# Patient Record
Sex: MALE | Race: WHITE | NOT HISPANIC OR LATINO | Employment: OTHER | URBAN - METROPOLITAN AREA
[De-identification: names, ages, dates, MRNs, and addresses within clinical notes are randomized per-mention and may not be internally consistent; named-entity substitution may affect disease eponyms.]

---

## 2021-10-03 ENCOUNTER — HOSPITAL ENCOUNTER (INPATIENT)
Facility: HOSPITAL | Age: 86
LOS: 6 days | Discharge: HOME HEALTH | End: 2021-10-09
Attending: EMERGENCY MEDICINE | Admitting: HOSPITALIST

## 2021-10-03 DIAGNOSIS — R31.0 GROSS HEMATURIA: Primary | ICD-10-CM

## 2021-10-03 LAB
ALBUMIN SERPL-MCNC: 3.8 G/DL (ref 3.5–5.2)
ALBUMIN/GLOB SERPL: 1.4 G/DL
ALP SERPL-CCNC: 61 U/L (ref 39–117)
ALT SERPL W P-5'-P-CCNC: 10 U/L (ref 1–41)
ANION GAP SERPL CALCULATED.3IONS-SCNC: 8.2 MMOL/L (ref 5–15)
APTT PPP: 39.1 SECONDS (ref 22.7–35.4)
AST SERPL-CCNC: 21 U/L (ref 1–40)
BACTERIA UR QL AUTO: ABNORMAL /HPF
BASOPHILS # BLD AUTO: 0.02 10*3/MM3 (ref 0–0.2)
BASOPHILS NFR BLD AUTO: 0.3 % (ref 0–1.5)
BILIRUB SERPL-MCNC: 0.5 MG/DL (ref 0–1.2)
BILIRUB UR QL STRIP: NEGATIVE
BUN SERPL-MCNC: 34 MG/DL (ref 8–23)
BUN/CREAT SERPL: 20.9 (ref 7–25)
CALCIUM SPEC-SCNC: 9.5 MG/DL (ref 8.2–9.6)
CHLORIDE SERPL-SCNC: 104 MMOL/L (ref 98–107)
CLARITY UR: ABNORMAL
CO2 SERPL-SCNC: 26.8 MMOL/L (ref 22–29)
COLOR UR: ABNORMAL
CREAT SERPL-MCNC: 1.63 MG/DL (ref 0.76–1.27)
DEPRECATED RDW RBC AUTO: 46.8 FL (ref 37–54)
EOSINOPHIL # BLD AUTO: 0.05 10*3/MM3 (ref 0–0.4)
EOSINOPHIL NFR BLD AUTO: 0.8 % (ref 0.3–6.2)
ERYTHROCYTE [DISTWIDTH] IN BLOOD BY AUTOMATED COUNT: 12.7 % (ref 12.3–15.4)
GFR SERPL CREATININE-BSD FRML MDRD: 39 ML/MIN/1.73
GLOBULIN UR ELPH-MCNC: 2.8 GM/DL
GLUCOSE SERPL-MCNC: 113 MG/DL (ref 65–99)
GLUCOSE UR STRIP-MCNC: NEGATIVE MG/DL
HCT VFR BLD AUTO: 29.5 % (ref 37.5–51)
HGB BLD-MCNC: 9.4 G/DL (ref 13–17.7)
HGB UR QL STRIP.AUTO: ABNORMAL
HYALINE CASTS UR QL AUTO: ABNORMAL /LPF
IMM GRANULOCYTES # BLD AUTO: 0.01 10*3/MM3 (ref 0–0.05)
IMM GRANULOCYTES NFR BLD AUTO: 0.2 % (ref 0–0.5)
INR PPP: 1.19 (ref 0.9–1.1)
KETONES UR QL STRIP: ABNORMAL
LEUKOCYTE ESTERASE UR QL STRIP.AUTO: ABNORMAL
LYMPHOCYTES # BLD AUTO: 0.7 10*3/MM3 (ref 0.7–3.1)
LYMPHOCYTES NFR BLD AUTO: 10.8 % (ref 19.6–45.3)
MCH RBC QN AUTO: 31.9 PG (ref 26.6–33)
MCHC RBC AUTO-ENTMCNC: 31.9 G/DL (ref 31.5–35.7)
MCV RBC AUTO: 100 FL (ref 79–97)
MONOCYTES # BLD AUTO: 0.56 10*3/MM3 (ref 0.1–0.9)
MONOCYTES NFR BLD AUTO: 8.6 % (ref 5–12)
NEUTROPHILS NFR BLD AUTO: 5.16 10*3/MM3 (ref 1.7–7)
NEUTROPHILS NFR BLD AUTO: 79.3 % (ref 42.7–76)
NITRITE UR QL STRIP: POSITIVE
NRBC BLD AUTO-RTO: 0 /100 WBC (ref 0–0.2)
PH UR STRIP.AUTO: 7.5 [PH] (ref 5–8)
PLATELET # BLD AUTO: 149 10*3/MM3 (ref 140–450)
PMV BLD AUTO: 9.5 FL (ref 6–12)
POTASSIUM SERPL-SCNC: 5.1 MMOL/L (ref 3.5–5.2)
PROT SERPL-MCNC: 6.6 G/DL (ref 6–8.5)
PROT UR QL STRIP: ABNORMAL
PROTHROMBIN TIME: 14.9 SECONDS (ref 11.7–14.2)
RBC # BLD AUTO: 2.95 10*6/MM3 (ref 4.14–5.8)
RBC # UR: ABNORMAL /HPF
REF LAB TEST METHOD: ABNORMAL
SODIUM SERPL-SCNC: 139 MMOL/L (ref 136–145)
SP GR UR STRIP: 1.01 (ref 1–1.03)
SQUAMOUS #/AREA URNS HPF: ABNORMAL /HPF
UROBILINOGEN UR QL STRIP: ABNORMAL
WBC # BLD AUTO: 6.5 10*3/MM3 (ref 3.4–10.8)
WBC UR QL AUTO: ABNORMAL /HPF

## 2021-10-03 PROCEDURE — 99284 EMERGENCY DEPT VISIT MOD MDM: CPT

## 2021-10-03 PROCEDURE — 80053 COMPREHEN METABOLIC PANEL: CPT | Performed by: EMERGENCY MEDICINE

## 2021-10-03 PROCEDURE — 87186 SC STD MICRODIL/AGAR DIL: CPT | Performed by: EMERGENCY MEDICINE

## 2021-10-03 PROCEDURE — 85610 PROTHROMBIN TIME: CPT | Performed by: EMERGENCY MEDICINE

## 2021-10-03 PROCEDURE — 85730 THROMBOPLASTIN TIME PARTIAL: CPT | Performed by: EMERGENCY MEDICINE

## 2021-10-03 PROCEDURE — 85025 COMPLETE CBC W/AUTO DIFF WBC: CPT | Performed by: EMERGENCY MEDICINE

## 2021-10-03 PROCEDURE — U0004 COV-19 TEST NON-CDC HGH THRU: HCPCS | Performed by: EMERGENCY MEDICINE

## 2021-10-03 PROCEDURE — 25010000002 CEFTRIAXONE PER 250 MG: Performed by: EMERGENCY MEDICINE

## 2021-10-03 PROCEDURE — 87086 URINE CULTURE/COLONY COUNT: CPT | Performed by: EMERGENCY MEDICINE

## 2021-10-03 PROCEDURE — 81001 URINALYSIS AUTO W/SCOPE: CPT | Performed by: EMERGENCY MEDICINE

## 2021-10-03 PROCEDURE — U0005 INFEC AGEN DETEC AMPLI PROBE: HCPCS | Performed by: EMERGENCY MEDICINE

## 2021-10-03 RX ORDER — LATANOPROST 50 UG/ML
1 SOLUTION/ DROPS OPHTHALMIC NIGHTLY
COMMUNITY

## 2021-10-03 RX ORDER — FERROUS SULFATE 325(65) MG
325 TABLET ORAL
COMMUNITY
End: 2022-02-16 | Stop reason: HOSPADM

## 2021-10-03 RX ORDER — FUROSEMIDE 20 MG/1
20 TABLET ORAL DAILY PRN
COMMUNITY
End: 2021-10-09 | Stop reason: HOSPADM

## 2021-10-03 RX ORDER — DIGOXIN 125 MCG
125 TABLET ORAL
COMMUNITY

## 2021-10-03 RX ORDER — TAMSULOSIN HYDROCHLORIDE 0.4 MG/1
1 CAPSULE ORAL NIGHTLY
COMMUNITY

## 2021-10-03 RX ORDER — SODIUM CHLORIDE 9 MG/ML
75 INJECTION, SOLUTION INTRAVENOUS CONTINUOUS
Status: DISCONTINUED | OUTPATIENT
Start: 2021-10-04 | End: 2021-10-05

## 2021-10-03 RX ADMIN — CEFTRIAXONE 1 G: 1 INJECTION, POWDER, FOR SOLUTION INTRAMUSCULAR; INTRAVENOUS at 18:38

## 2021-10-03 NOTE — ED TRIAGE NOTES
Pt reports blood in his urine that started last night around 1800. Pt denies blood thinners.      Pt was wearing a mask during assessment.  This RN wore appropriate PPE

## 2021-10-03 NOTE — ED PROVIDER NOTES
EMERGENCY DEPARTMENT ENCOUNTER    Room Number:  39/39  Date of encounter:  10/3/2021  PCP: Adolfo Thacker MD  Historian: Patient and family      HPI:  Chief Complaint: Hematuria  A complete HPI/ROS/PMH/PSH/SH/FH are unobtainable due to: Age    Context: Vinicio Samayoa is a 89 y.o. male who presents to the ED c/o hematuria which started earlier today.  He also reports dysuria and urinary frequency.  She denies abdominal pain, nausea, vomiting, diarrhea.  No fevers or chills.  No cough, congestion or trouble breathing.      The patient was placed in a mask in triage, hand hygiene was performed before and after my interaction with the patient.  I wore a mask, safety glasses and gloves during my entire interaction with the patient.    PAST MEDICAL HISTORY  Active Ambulatory Problems     Diagnosis Date Noted   • No Active Ambulatory Problems     Resolved Ambulatory Problems     Diagnosis Date Noted   • No Resolved Ambulatory Problems     No Additional Past Medical History         PAST SURGICAL HISTORY  No past surgical history on file.      FAMILY HISTORY  History reviewed. No pertinent family history.      SOCIAL HISTORY  Social History     Socioeconomic History   • Marital status:      Spouse name: Not on file   • Number of children: Not on file   • Years of education: Not on file   • Highest education level: Not on file   Tobacco Use   • Smoking status: Former Smoker   • Smokeless tobacco: Never Used   Vaping Use   • Vaping Use: Never used   Substance and Sexual Activity   • Drug use: Not Currently   • Sexual activity: Not Currently         ALLERGIES  Patient has no allergy information on record.        REVIEW OF SYSTEMS  Review of Systems   Constitutional: Negative for chills and fever.   Respiratory: Negative for chest tightness.    Cardiovascular: Negative for chest pain.   Gastrointestinal: Negative for abdominal pain, diarrhea, nausea and vomiting.   Genitourinary: Positive for dysuria, frequency and  hematuria.        All systems reviewed and negative except for those discussed in HPI.       PHYSICAL EXAM    I have reviewed the triage vital signs and nursing notes.    ED Triage Vitals   Temp Heart Rate Resp BP SpO2   10/03/21 1626 10/03/21 1626 10/03/21 1626 10/03/21 1702 10/03/21 1626   99.6 °F (37.6 °C) 77 16 116/73 95 %      Temp src Heart Rate Source Patient Position BP Location FiO2 (%)   10/03/21 1626 10/03/21 1626 -- -- --   Oral Monitor          Physical Exam   Constitutional: Pt. is awake and alert and in no distress.  He is very spry for his 89 years.   HENT: Normocephalic and atraumatic.   Neck: Normal range of motion. Neck supple. No JVD present.   Cardiovascular: Normal rate, regular rhythm and normal heart sounds. Exam reveals no gallop and no friction rub.   No murmur heard.  Pulmonary/Chest: Effort normal and breath sounds normal. No stridor. No respiratory distress. No wheezes, no rales.   Abdominal: Soft. Bowel sounds are normal. No distension. There is no tenderness. There is no rebound and no guarding.   Musculoskeletal: Age-appropriate range of motion. No edema, tenderness or deformity.   Neurological: Pt. is awake and alert.  Cranial nerves II through XII are intact.  He has no focal neurologic deficits  Skin: Skin is warm and dry. No rash noted. Pt. is not diaphoretic. No erythema.   Psychiatric: Mood, affect normal.  He is pleasant and cooperative.  Nursing note and vitals reviewed.        LAB RESULTS  Recent Results (from the past 24 hour(s))   Comprehensive Metabolic Panel    Collection Time: 10/03/21  5:23 PM    Specimen: Blood   Result Value Ref Range    Glucose 113 (H) 65 - 99 mg/dL    BUN 34 (H) 8 - 23 mg/dL    Creatinine 1.63 (H) 0.76 - 1.27 mg/dL    Sodium 139 136 - 145 mmol/L    Potassium 5.1 3.5 - 5.2 mmol/L    Chloride 104 98 - 107 mmol/L    CO2 26.8 22.0 - 29.0 mmol/L    Calcium 9.5 8.2 - 9.6 mg/dL    Total Protein 6.6 6.0 - 8.5 g/dL    Albumin 3.80 3.50 - 5.20 g/dL    ALT  (SGPT) 10 1 - 41 U/L    AST (SGOT) 21 1 - 40 U/L    Alkaline Phosphatase 61 39 - 117 U/L    Total Bilirubin 0.5 0.0 - 1.2 mg/dL    eGFR Non African Amer 39 (L) >60 mL/min/1.73    Globulin 2.8 gm/dL    A/G Ratio 1.4 g/dL    BUN/Creatinine Ratio 20.9 7.0 - 25.0    Anion Gap 8.2 5.0 - 15.0 mmol/L   Protime-INR    Collection Time: 10/03/21  5:23 PM    Specimen: Blood   Result Value Ref Range    Protime 14.9 (H) 11.7 - 14.2 Seconds    INR 1.19 (H) 0.90 - 1.10   aPTT    Collection Time: 10/03/21  5:23 PM    Specimen: Blood   Result Value Ref Range    PTT 39.1 (H) 22.7 - 35.4 seconds   CBC Auto Differential    Collection Time: 10/03/21  5:23 PM    Specimen: Blood   Result Value Ref Range    WBC 6.50 3.40 - 10.80 10*3/mm3    RBC 2.95 (L) 4.14 - 5.80 10*6/mm3    Hemoglobin 9.4 (L) 13.0 - 17.7 g/dL    Hematocrit 29.5 (L) 37.5 - 51.0 %    .0 (H) 79.0 - 97.0 fL    MCH 31.9 26.6 - 33.0 pg    MCHC 31.9 31.5 - 35.7 g/dL    RDW 12.7 12.3 - 15.4 %    RDW-SD 46.8 37.0 - 54.0 fl    MPV 9.5 6.0 - 12.0 fL    Platelets 149 140 - 450 10*3/mm3    Neutrophil % 79.3 (H) 42.7 - 76.0 %    Lymphocyte % 10.8 (L) 19.6 - 45.3 %    Monocyte % 8.6 5.0 - 12.0 %    Eosinophil % 0.8 0.3 - 6.2 %    Basophil % 0.3 0.0 - 1.5 %    Immature Grans % 0.2 0.0 - 0.5 %    Neutrophils, Absolute 5.16 1.70 - 7.00 10*3/mm3    Lymphocytes, Absolute 0.70 0.70 - 3.10 10*3/mm3    Monocytes, Absolute 0.56 0.10 - 0.90 10*3/mm3    Eosinophils, Absolute 0.05 0.00 - 0.40 10*3/mm3    Basophils, Absolute 0.02 0.00 - 0.20 10*3/mm3    Immature Grans, Absolute 0.01 0.00 - 0.05 10*3/mm3    nRBC 0.0 0.0 - 0.2 /100 WBC       Ordered the above labs and independently reviewed the results.        RADIOLOGY  No Radiology Exams Resulted Within Past 24 Hours    I ordered the above noted radiological studies. Reviewed by me and discussed with radiologist.  See dictation for official radiology interpretation.      PROCEDURES    Procedures      MEDICATIONS GIVEN IN ER    Medications    cefTRIAXone (ROCEPHIN) 1 g in sodium chloride 0.9 % 100 mL IVPB-VTB (has no administration in time range)         PROGRESS, DATA ANALYSIS, CONSULTS, AND MEDICAL DECISION MAKING    Any/all labs have been independently reviewed by me.  Any/all radiology studies have been reviewed by me and discussed with radiologist dictating the report.   EKG's independently viewed and interpreted by me.  Discussion below represents my analysis of pertinent findings related to patient's condition, differential diagnosis, treatment plan and final disposition.      ED Course as of Oct 03 1808   Sun Oct 03, 2021   1754 CBC is significant for hemoglobin 9.4, hematocrit 29.5.   CMP shows BUN of 34 and a creatinine of 1.6. I do not have any prior labs from this gentleman for comparison.    [WC]   1803 Case discussed with Dr. Murcia (internal medicine)-the patient will be admitted to a [facility] bed continuous bladder irrigation and urology consult.  Will cover with Rocephin pending urine culture results.    [WC]      ED Course User Index  [WC] Jose Hernandez MD       AS OF 18:08 EDT VITALS:    BP - 116/73  HR - 66  TEMP - 99.6 °F (37.6 °C) (Oral)  02 SATS - 97%        DIAGNOSIS  Final diagnoses:   Gross hematuria         DISPOSITION  Admitted-[facility]           Jose Hernandez MD  10/03/21 1808

## 2021-10-04 ENCOUNTER — APPOINTMENT (OUTPATIENT)
Dept: CT IMAGING | Facility: HOSPITAL | Age: 86
End: 2021-10-04

## 2021-10-04 LAB
ANION GAP SERPL CALCULATED.3IONS-SCNC: 9.2 MMOL/L (ref 5–15)
BASOPHILS # BLD AUTO: 0.02 10*3/MM3 (ref 0–0.2)
BASOPHILS NFR BLD AUTO: 0.3 % (ref 0–1.5)
BUN SERPL-MCNC: 30 MG/DL (ref 8–23)
BUN/CREAT SERPL: 20.5 (ref 7–25)
CALCIUM SPEC-SCNC: 9.2 MG/DL (ref 8.2–9.6)
CHLORIDE SERPL-SCNC: 105 MMOL/L (ref 98–107)
CO2 SERPL-SCNC: 24.8 MMOL/L (ref 22–29)
CREAT SERPL-MCNC: 1.46 MG/DL (ref 0.76–1.27)
DEPRECATED RDW RBC AUTO: 46.1 FL (ref 37–54)
EOSINOPHIL # BLD AUTO: 0.07 10*3/MM3 (ref 0–0.4)
EOSINOPHIL NFR BLD AUTO: 1.2 % (ref 0.3–6.2)
ERYTHROCYTE [DISTWIDTH] IN BLOOD BY AUTOMATED COUNT: 12.7 % (ref 12.3–15.4)
GFR SERPL CREATININE-BSD FRML MDRD: 45 ML/MIN/1.73
GLUCOSE SERPL-MCNC: 78 MG/DL (ref 65–99)
HCT VFR BLD AUTO: 25.7 % (ref 37.5–51)
HGB BLD-MCNC: 8.3 G/DL (ref 13–17.7)
IMM GRANULOCYTES # BLD AUTO: 0.02 10*3/MM3 (ref 0–0.05)
IMM GRANULOCYTES NFR BLD AUTO: 0.3 % (ref 0–0.5)
LYMPHOCYTES # BLD AUTO: 1.23 10*3/MM3 (ref 0.7–3.1)
LYMPHOCYTES NFR BLD AUTO: 20.4 % (ref 19.6–45.3)
MCH RBC QN AUTO: 32.2 PG (ref 26.6–33)
MCHC RBC AUTO-ENTMCNC: 32.3 G/DL (ref 31.5–35.7)
MCV RBC AUTO: 99.6 FL (ref 79–97)
MONOCYTES # BLD AUTO: 0.56 10*3/MM3 (ref 0.1–0.9)
MONOCYTES NFR BLD AUTO: 9.3 % (ref 5–12)
NEUTROPHILS NFR BLD AUTO: 4.12 10*3/MM3 (ref 1.7–7)
NEUTROPHILS NFR BLD AUTO: 68.5 % (ref 42.7–76)
NRBC BLD AUTO-RTO: 0 /100 WBC (ref 0–0.2)
PLATELET # BLD AUTO: 148 10*3/MM3 (ref 140–450)
PMV BLD AUTO: 9.9 FL (ref 6–12)
POTASSIUM SERPL-SCNC: 4.7 MMOL/L (ref 3.5–5.2)
RBC # BLD AUTO: 2.58 10*6/MM3 (ref 4.14–5.8)
SARS-COV-2 ORF1AB RESP QL NAA+PROBE: NOT DETECTED
SODIUM SERPL-SCNC: 139 MMOL/L (ref 136–145)
WBC # BLD AUTO: 6.02 10*3/MM3 (ref 3.4–10.8)

## 2021-10-04 PROCEDURE — 74178 CT ABD&PLV WO CNTR FLWD CNTR: CPT

## 2021-10-04 PROCEDURE — 25010000002 IOPAMIDOL 61 % SOLUTION: Performed by: HOSPITALIST

## 2021-10-04 PROCEDURE — 85025 COMPLETE CBC W/AUTO DIFF WBC: CPT | Performed by: HOSPITALIST

## 2021-10-04 PROCEDURE — 80048 BASIC METABOLIC PNL TOTAL CA: CPT | Performed by: HOSPITALIST

## 2021-10-04 PROCEDURE — 25010000002 CEFTRIAXONE PER 250 MG: Performed by: HOSPITALIST

## 2021-10-04 RX ORDER — TAMSULOSIN HYDROCHLORIDE 0.4 MG/1
0.4 CAPSULE ORAL NIGHTLY
Status: DISCONTINUED | OUTPATIENT
Start: 2021-10-04 | End: 2021-10-09 | Stop reason: HOSPADM

## 2021-10-04 RX ORDER — DIGOXIN 125 MCG
125 TABLET ORAL
Status: DISCONTINUED | OUTPATIENT
Start: 2021-10-04 | End: 2021-10-09 | Stop reason: HOSPADM

## 2021-10-04 RX ORDER — FERROUS SULFATE 325(65) MG
325 TABLET ORAL
Status: DISCONTINUED | OUTPATIENT
Start: 2021-10-04 | End: 2021-10-09 | Stop reason: HOSPADM

## 2021-10-04 RX ORDER — LATANOPROST 50 UG/ML
1 SOLUTION/ DROPS OPHTHALMIC NIGHTLY
Status: DISCONTINUED | OUTPATIENT
Start: 2021-10-04 | End: 2021-10-09 | Stop reason: HOSPADM

## 2021-10-04 RX ORDER — FAMOTIDINE 20 MG/1
20 TABLET, FILM COATED ORAL 2 TIMES DAILY
Status: DISCONTINUED | OUTPATIENT
Start: 2021-10-04 | End: 2021-10-09 | Stop reason: HOSPADM

## 2021-10-04 RX ADMIN — CEFTRIAXONE 1 G: 1 INJECTION, POWDER, FOR SOLUTION INTRAMUSCULAR; INTRAVENOUS at 17:42

## 2021-10-04 RX ADMIN — IOPAMIDOL 85 ML: 612 INJECTION, SOLUTION INTRAVENOUS at 17:15

## 2021-10-04 RX ADMIN — TAMSULOSIN HYDROCHLORIDE 0.4 MG: 0.4 CAPSULE ORAL at 20:32

## 2021-10-04 RX ADMIN — LATANOPROST 1 DROP: 50 SOLUTION OPHTHALMIC at 20:32

## 2021-10-04 RX ADMIN — DIGOXIN 125 MCG: 125 TABLET ORAL at 12:56

## 2021-10-04 RX ADMIN — FERROUS SULFATE TAB 325 MG (65 MG ELEMENTAL FE) 325 MG: 325 (65 FE) TAB at 12:00

## 2021-10-04 RX ADMIN — SODIUM CHLORIDE 50 ML/HR: 9 INJECTION, SOLUTION INTRAVENOUS at 12:08

## 2021-10-04 RX ADMIN — SODIUM CHLORIDE 75 ML/HR: 9 INJECTION, SOLUTION INTRAVENOUS at 00:28

## 2021-10-04 NOTE — PLAN OF CARE
Goal Outcome Evaluation:      PT resting in bed, CBI on a steady flow, urine slowly becoming lighter. As of now it is a slight pink tinged. PT up to bathroom, had a a large BM, but is impulsive when walking, used assist x2 and walker. Gait unsteady when ambulating without walker. Bed alarm on and active and daughter to remain at bedside. VSS throughout the day, pt reporting relief of burning sensation thanks to the CBI. No distress noted, will continue to monitor patient.

## 2021-10-04 NOTE — CONSULTS
[facility]                                                                                             Nephrology Initial Consult Note    Patient Identification:  Name: Vinicio Samayoa MRN: [id number]  Age: 89 y.o. : 1924  Sex: male  Date:10/4/2021    Requesting Physician: As per consult order.  Reason for Consultation: CKD/KAUSHIK  Consult note was modified.  Chart check was done yesterday and patient was officially seen and examined today, full consult note completed today for billing purposes and date of consult changed to reflect bath.    Information from:patient/ family/ chart      History of Present Illness: This is a 89 y.o. year old male with unknown history of chronic kidney disease.  His initial creatinine was 1.6 last night and decreased to 1.46 today.  He presented with gross hematuria, urology is following and he is on a continuous bladder irrigation.  I do not have any prior creatinine levels to compare to.  There is no mention in the chart of prior CKD.  Hematuria started earlier today he also reported some dysuria and urinary frequency consistent with a UTI.  No abdominal pain nausea vomiting diarrhea fevers chills cough congestion shortness of breath or chest pain reported.  On Lasix at home and that is on hold.  Recent treatment for CHF at [facility], creatinine peaked around 2.1 but his recent baseline has been around 1.2-1.4    The following medical history and medications personally reviewed by me:    Problem List:   Patient Active Problem List    Diagnosis    • Gross hematuria [R31.0]        Past Medical History:  BPH, osteoarthritis, chronic anemia, GERD    Past Surgical History:  No past surgical history on file.     Home Meds:   Medications Prior to Admission   Medication Sig Dispense Refill Last Dose   • DANDELION PO Take  by mouth 3 (Three) Times a Day.   10/3/2021 at 1200   •  digoxin (LANOXIN) 125 MCG tablet Take 125 mcg by mouth Daily With Lunch.   10/2/2021 at 1200   • furosemide (LASIX) 20 MG tablet Take 20 mg by mouth Daily As Needed.      • Ginkgo Biloba (GINKOBA PO) Take 2 capsules by mouth Daily.   10/3/2021 at 0800   • HAWTHORN PO Take  by mouth 3 (Three) Times a Day.   10/3/2021 at 1200   • latanoprost (XALATAN) 0.005 % ophthalmic solution 1 drop Every Night.   10/2/2021 at 2100   • Nutritional Supplements (GRAPESEED EXTRACT PO) Take  by mouth Daily.   10/3/2021 at 0800   • tamsulosin (FLOMAX) 0.4 MG capsule 24 hr capsule Take 1 capsule by mouth Every Night.   10/2/2021 at 2100   • ferrous sulfate 325 (65 FE) MG tablet Take 325 mg by mouth Daily With Breakfast.   Unknown at Unknown time       Current Meds:   Current Facility-Administered Medications   Medication Dose Route Frequency Provider Last Rate Last Admin   • cefTRIAXone (ROCEPHIN) 1 g in sodium chloride 0.9 % 100 mL IVPB-VTB  1 g Intravenous Q24H Alen Murcia MD       • digoxin (LANOXIN) tablet 125 mcg  125 mcg Oral Daily With Lunch Alen Murcia MD   125 mcg at 10/04/21 1256   • famotidine (PEPCID) tablet 20 mg  20 mg Oral BID Alen Murcia MD       • ferrous sulfate tablet 325 mg  325 mg Oral Daily With Breakfast Alen Murcia MD   325 mg at 10/04/21 1200   • latanoprost (XALATAN) 0.005 % ophthalmic solution 1 drop  1 drop Both Eyes Nightly Alen Murcia MD       • sodium chloride 0.9 % infusion  75 mL/hr Intravenous Continuous Alen Murcia MD 75 mL/hr at 10/04/21 1301 75 mL/hr at 10/04/21 1301   • tamsulosin (FLOMAX) 24 hr capsule 0.4 mg  0.4 mg Oral Nightly Alen Murcai MD           Allergies:  No Known Allergies    Social History:   Social History     Socioeconomic History   • Marital status:      Spouse name: Not on file   • Number of children: Not on file   • Years of education: Not on file   • Highest education level: Not on file   Tobacco Use   • Smoking status: Former Smoker   • Smokeless tobacco:  "Never Used   Vaping Use   • Vaping Use: Never used   Substance and Sexual Activity   • Drug use: Not Currently   • Sexual activity: Not Currently        Family History:  No family history of renal disease    Review of Systems: as per HPI, in addition:    General:      + Weakness / fatigue,                       No fevers / chills                       no weight loss  HEENT:       no dysphagia / odynophagia  Neck:           normal range of motion, no swelling  Respiratory: no cough / congestion                      No shortness of air                       No wheezing  CV:              No chest pain                       No palpitations  Abdomen/GI: no nausea / vomiting                      No diarrhea / constipation                      No abdominal pain  :            Reports dysuria, increased frequency and hematuria  Endocrine:   no polyuria / polydipsia,                      No heat or cold intolerance  Skin:           no rashes or skin breakdown   Vascular:   No edema                     No claudication  Psych:        no depression/ anxiety  Neuro:        no focal weakness, no seizures  Musculoskeletal: no joint pain or deformities      Physical Exam:  Vitals:   Temp (24hrs), Av.4 °F (36.9 °C), Min:97.1 °F (36.2 °C), Max:99.6 °F (37.6 °C)    /65 (BP Location: Left arm, Patient Position: Lying)   Pulse 82   Temp 97.1 °F (36.2 °C) (Oral)   Resp 18   Ht 175.3 cm (69\")   Wt 68.3 kg (150 lb 9.2 oz)   SpO2 94%   BMI 22.24 kg/m²   Intake/Output:     Intake/Output Summary (Last 24 hours) at 10/4/2021 1315  Last data filed at 10/4/2021 1257  Gross per 24 hour   Intake 58419 ml   Output 48867 ml   Net -8420 ml        Wt Readings from Last 1 Encounters:   10/03/21 2104 68.3 kg (150 lb 9.2 oz)       Exam:  Exam:    General Appearance:  Awake, alert, oriented x3, no acute distress  Chronically ill-appearing   Head and Face:  Normocephalic, atraumatic, mucus membranes moist, oropharynx clear   Eyes:  No " icterus, pupils equal round and reactive to light, extraocular movements intact    ENMT: Moist mucosa, tongue symmetric    Neck: Supple  no jugular venous distention  no thyromegaly   Pulmonary:  Respiratory effort: Normal  Auscultation of lungs: Clear bilaterally  No wheezes  No rhonchi  Good air movement, good expansion   Chest wall:  No tenderness or deformity   Cardiovascular:  Auscultation of the heart: Normal rhythm, no murmurs  Trace edema of bilateral lower extremities   Abdomen:  Abdomen: soft, non-tender, normal bowel sounds all four quadrants, no masses   Liver and spleen: no hepatosplenomegaly   Musculoskeletal: Digits and nails: normal  Normal range of motion  No joint swelling or gross deformities    Skin: Skin inspection: color normal, no visible rashes or lesions  Skin palpation: texture, turgor normal, no palpable lesions   Lymphatic:  no cervical lymphadenopathy    Psychiatric: Judgement and insight: normal  Orientation to person place and time: normal  Mood and affect: normal   Continuous bladder irrigation underway      DATA:  Radiology and Labs:  The following labs independently reviewed by me, additional AM labs ordered  Old records independently reviewed showing unknown CKD  The following radiologic studies independently viewed by me, findings no recent imaging  Interval notes, chart personally reviewed by me.  I have reviewed and summarized old records as detailed above, recent records from [facility] show a labile creatinine but baseline estimated to be around 1.2-1.4  Plan of care discussed with patient  New problems include hematuria, UTI      Risk/ complexity of medical care/ medical decision making: Moderate  Chronic illness with severe exacerbation or progression      Labs:   Recent Results (from the past 24 hour(s))   Comprehensive Metabolic Panel    Collection Time: 10/03/21  5:23 PM    Specimen: Blood   Result Value Ref Range    Glucose 113 (H) 65 - 99 mg/dL    BUN 34 (H) 8 - 23 mg/dL     Creatinine 1.63 (H) 0.76 - 1.27 mg/dL    Sodium 139 136 - 145 mmol/L    Potassium 5.1 3.5 - 5.2 mmol/L    Chloride 104 98 - 107 mmol/L    CO2 26.8 22.0 - 29.0 mmol/L    Calcium 9.5 8.2 - 9.6 mg/dL    Total Protein 6.6 6.0 - 8.5 g/dL    Albumin 3.80 3.50 - 5.20 g/dL    ALT (SGPT) 10 1 - 41 U/L    AST (SGOT) 21 1 - 40 U/L    Alkaline Phosphatase 61 39 - 117 U/L    Total Bilirubin 0.5 0.0 - 1.2 mg/dL    eGFR Non African Amer 39 (L) >60 mL/min/1.73    Globulin 2.8 gm/dL    A/G Ratio 1.4 g/dL    BUN/Creatinine Ratio 20.9 7.0 - 25.0    Anion Gap 8.2 5.0 - 15.0 mmol/L   Protime-INR    Collection Time: 10/03/21  5:23 PM    Specimen: Blood   Result Value Ref Range    Protime 14.9 (H) 11.7 - 14.2 Seconds    INR 1.19 (H) 0.90 - 1.10   aPTT    Collection Time: 10/03/21  5:23 PM    Specimen: Blood   Result Value Ref Range    PTT 39.1 (H) 22.7 - 35.4 seconds   CBC Auto Differential    Collection Time: 10/03/21  5:23 PM    Specimen: Blood   Result Value Ref Range    WBC 6.50 3.40 - 10.80 10*3/mm3    RBC 2.95 (L) 4.14 - 5.80 10*6/mm3    Hemoglobin 9.4 (L) 13.0 - 17.7 g/dL    Hematocrit 29.5 (L) 37.5 - 51.0 %    .0 (H) 79.0 - 97.0 fL    MCH 31.9 26.6 - 33.0 pg    MCHC 31.9 31.5 - 35.7 g/dL    RDW 12.7 12.3 - 15.4 %    RDW-SD 46.8 37.0 - 54.0 fl    MPV 9.5 6.0 - 12.0 fL    Platelets 149 140 - 450 10*3/mm3    Neutrophil % 79.3 (H) 42.7 - 76.0 %    Lymphocyte % 10.8 (L) 19.6 - 45.3 %    Monocyte % 8.6 5.0 - 12.0 %    Eosinophil % 0.8 0.3 - 6.2 %    Basophil % 0.3 0.0 - 1.5 %    Immature Grans % 0.2 0.0 - 0.5 %    Neutrophils, Absolute 5.16 1.70 - 7.00 10*3/mm3    Lymphocytes, Absolute 0.70 0.70 - 3.10 10*3/mm3    Monocytes, Absolute 0.56 0.10 - 0.90 10*3/mm3    Eosinophils, Absolute 0.05 0.00 - 0.40 10*3/mm3    Basophils, Absolute 0.02 0.00 - 0.20 10*3/mm3    Immature Grans, Absolute 0.01 0.00 - 0.05 10*3/mm3    nRBC 0.0 0.0 - 0.2 /100 WBC   COVID-19,APTIMA PANTHER(PRAFUL), JINNY, NP/OP SWAB IN UTM/VTM/SALINE TRANSPORT MEDIA,24  HR TAT - Swab, Nasopharynx    Collection Time: 10/03/21  6:39 PM    Specimen: Nasopharynx; Swab   Result Value Ref Range    COVID19 Not Detected Not Detected - Ref. Range   Urinalysis With Culture If Indicated - Urine, Random Void    Collection Time: 10/03/21  8:02 PM    Specimen: Urine, Random Void   Result Value Ref Range    Color, UA Red (A) Yellow, Straw    Appearance, UA Turbid (A) Clear    pH, UA 7.5 5.0 - 8.0    Specific Gravity, UA 1.010 1.005 - 1.030    Glucose, UA Negative Negative    Ketones, UA 15 mg/dL (1+) (A) Negative    Bilirubin, UA Negative Negative    Blood, UA Large (3+) (A) Negative    Protein, UA >=300 mg/dL (3+) (A) Negative    Leuk Esterase, UA Large (3+) (A) Negative    Nitrite, UA Positive (A) Negative    Urobilinogen, UA >=8.0 E.U./dL (A) 0.2 - 1.0 E.U./dL   Urinalysis, Microscopic Only - Urine, Random Void    Collection Time: 10/03/21  8:02 PM    Specimen: Urine, Random Void   Result Value Ref Range    RBC, UA Too Numerous to Count (A) None Seen, 0-2 /HPF    WBC, UA Unable to determine due to loaded field (A) None Seen, 0-2 /HPF    Bacteria, UA Unable to determine due to loaded field (A) None Seen /HPF    Squamous Epithelial Cells, UA Unable to determine due to loaded field (A) None Seen, 0-2 /HPF    Hyaline Casts, UA Unable to determine due to loaded field None Seen /LPF    Methodology Automated Microscopy    Basic Metabolic Panel    Collection Time: 10/04/21  7:57 AM    Specimen: Blood   Result Value Ref Range    Glucose 78 65 - 99 mg/dL    BUN 30 (H) 8 - 23 mg/dL    Creatinine 1.46 (H) 0.76 - 1.27 mg/dL    Sodium 139 136 - 145 mmol/L    Potassium 4.7 3.5 - 5.2 mmol/L    Chloride 105 98 - 107 mmol/L    CO2 24.8 22.0 - 29.0 mmol/L    Calcium 9.2 8.2 - 9.6 mg/dL    eGFR Non African Amer 45 (L) >60 mL/min/1.73    BUN/Creatinine Ratio 20.5 7.0 - 25.0    Anion Gap 9.2 5.0 - 15.0 mmol/L   CBC Auto Differential    Collection Time: 10/04/21  7:57 AM    Specimen: Blood   Result Value Ref Range     WBC 6.02 3.40 - 10.80 10*3/mm3    RBC 2.58 (L) 4.14 - 5.80 10*6/mm3    Hemoglobin 8.3 (L) 13.0 - 17.7 g/dL    Hematocrit 25.7 (L) 37.5 - 51.0 %    MCV 99.6 (H) 79.0 - 97.0 fL    MCH 32.2 26.6 - 33.0 pg    MCHC 32.3 31.5 - 35.7 g/dL    RDW 12.7 12.3 - 15.4 %    RDW-SD 46.1 37.0 - 54.0 fl    MPV 9.9 6.0 - 12.0 fL    Platelets 148 140 - 450 10*3/mm3    Neutrophil % 68.5 42.7 - 76.0 %    Lymphocyte % 20.4 19.6 - 45.3 %    Monocyte % 9.3 5.0 - 12.0 %    Eosinophil % 1.2 0.3 - 6.2 %    Basophil % 0.3 0.0 - 1.5 %    Immature Grans % 0.3 0.0 - 0.5 %    Neutrophils, Absolute 4.12 1.70 - 7.00 10*3/mm3    Lymphocytes, Absolute 1.23 0.70 - 3.10 10*3/mm3    Monocytes, Absolute 0.56 0.10 - 0.90 10*3/mm3    Eosinophils, Absolute 0.07 0.00 - 0.40 10*3/mm3    Basophils, Absolute 0.02 0.00 - 0.20 10*3/mm3    Immature Grans, Absolute 0.02 0.00 - 0.05 10*3/mm3    nRBC 0.0 0.0 - 0.2 /100 WBC       Radiology:  Imaging Results (Last 24 Hours)     ** No results found for the last 24 hours. **               ASSESSMENT:   Acute kidney injury versus CKD.  Creatinine improved with fluids  Gross hematuria  UTI  Worsening anemia, in part acute blood loss anemia.  May have some component of anemia of CKD  Gastroesophageal reflux disease  BPH  Chronic CHF, unknown if systolic or diastolic      PLAN:   Review of records from [place] shows that his baseline creatinine is around 1.2-1.4  Probably near baseline now but a.m. labs are pending  We will stop IV fluids with history of CHF   bladder irrigation per urology  Noted plans for cystoscopy with clot evacuation today  Agree with IV antibiotics and IV fluids  CT results noted, nodular bladder wall density seen, urology following  Urine electrolytes would not be helpful at this time while on CBI  Follow-up labs and electrolytes in a.m.    Continue to monitor electrolytes and volume closely, avoid IV contrast and nephrotoxic medications         Axel Aguirre M.D  [facility]  Office  phone number: [phone]  Answering service phone number: [phone]        10/4/2021        Dictation via Dragon dictation software

## 2021-10-04 NOTE — CONSULTS
FIRST UROLOGY CONSULT      Patient Identification:  NAME:  iVnicio Samayoa  Age:  89 y.o.   Sex:  male   :  1924   MRN:  [id number]       Chief complaint: hematuria    History of present illness:  89 years old  Hematuria  Presented to ER  3 way placed, w irrigation  Possible uti  Started on rocephin  Creat 1.46  Hematuria workup 2020: CT no stones or masses  Cysto showed BPH  On flomax    Past medical history:  History reviewed. No pertinent past medical history.    Past surgical history:  No past surgical history on file.    Allergies:  Patient has no known allergies.    Home medications:  Medications Prior to Admission   Medication Sig Dispense Refill Last Dose   • DANDELION PO Take  by mouth 3 (Three) Times a Day.   10/3/2021 at 1200   • digoxin (LANOXIN) 125 MCG tablet Take 125 mcg by mouth Daily With Lunch.   10/2/2021 at 1200   • furosemide (LASIX) 20 MG tablet Take 20 mg by mouth Daily As Needed.      • Ginkgo Biloba (GINKOBA PO) Take 2 capsules by mouth Daily.   10/3/2021 at 0800   • HAWTHORN PO Take  by mouth 3 (Three) Times a Day.   10/3/2021 at 1200   • latanoprost (XALATAN) 0.005 % ophthalmic solution 1 drop Every Night.   10/2/2021 at 2100   • Nutritional Supplements (GRAPESEED EXTRACT PO) Take  by mouth Daily.   10/3/2021 at 0800   • tamsulosin (FLOMAX) 0.4 MG capsule 24 hr capsule Take 1 capsule by mouth Every Night.   10/2/2021 at 2100   • ferrous sulfate 325 (65 FE) MG tablet Take 325 mg by mouth Daily With Breakfast.   Unknown at Unknown time        Hospital medications:  cefTRIAXone, 1 g, Intravenous, Q24H  digoxin, 125 mcg, Oral, Daily With Lunch  famotidine, 20 mg, Oral, BID  ferrous sulfate, 325 mg, Oral, Daily With Breakfast  latanoprost, 1 drop, Both Eyes, Nightly  tamsulosin, 0.4 mg, Oral, Nightly      sodium chloride, 75 mL/hr, Last Rate: 75 mL/hr (10/04/21 1301)          Family history:  History reviewed. No pertinent family history.    Social history:  Social History      Tobacco Use   • Smoking status: Former Smoker   • Smokeless tobacco: Never Used   Vaping Use   • Vaping Use: Never used   Substance Use Topics   • Alcohol use: Not on file   • Drug use: Not Currently       Review of systems:    gwn no fever  Skin neg  Musculoskeletal neg  Endocrine neg  Heart no CP  Pulm neg  GI GERD   hematuria  Psych  neg  Neuro neg      Objective:  TMax 24 hours:   Temp (24hrs), Av.4 °F (36.9 °C), Min:97.1 °F (36.2 °C), Max:99.6 °F (37.6 °C)      Vitals Ranges:   Temp:  [97.1 °F (36.2 °C)-99.6 °F (37.6 °C)] 97.1 °F (36.2 °C)  Heart Rate:  [54-86] 82  Resp:  [16-18] 18  BP: (113-146)/(54-95) 115/65    Intake/Output Last 3 shifts:  I/O last 3 completed shifts:  In: 88169 [Other:66680; IV Piggyback:100]  Out: 12354 [Urine:58737]     Physical Exam:    General Appearance:    Alert, cooperative, NAD   HEENT:    No trauma, pupils reactive, hearing intact   Back:     No CVA tenderness   Lungs:     Respirations unlabored, no wheezing    Heart:    RRR, intact peripheral pulses   Abdomen:     Soft, NDNT, no masses, no guarding   :    Testes descended bilaterally, no nodules.  Penis normal.  No scrotal or penile rashes noted   Extremities:   No edema, no deformity   Lymphatic:   No neck or groin LAD   Skin:   No bleeding, bruising or rashes   Neuro/Psych:   Orientation intact, mood/affect pleasant, no focal findings       Results review:   I reviewed the patient's new clinical results.    Data review:  Lab Results (last 24 hours)     Procedure Component Value Units Date/Time    Basic Metabolic Panel [[id number]]  (Abnormal) Collected: 10/04/21 0757    Specimen: Blood Updated: 10/04/21 0948     Glucose 78 mg/dL      BUN 30 mg/dL      Creatinine 1.46 mg/dL      Sodium 139 mmol/L      Potassium 4.7 mmol/L      Chloride 105 mmol/L      CO2 24.8 mmol/L      Calcium 9.2 mg/dL      eGFR Non African Amer 45 mL/min/1.73      BUN/Creatinine Ratio 20.5     Anion Gap 9.2 mmol/L     Narrative:      GFR Normal  >60  Chronic Kidney Disease <60  Kidney Failure <15      CBC & Differential [[id number]]  (Abnormal) Collected: 10/04/21 0757    Specimen: Blood Updated: 10/04/21 0908    Narrative:      The following orders were created for panel order CBC & Differential.  Procedure                               Abnormality         Status                     ---------                               -----------         ------                     CBC Auto Differential[[id number]]        Abnormal            Final result                 Please view results for these tests on the individual orders.    CBC Auto Differential [[id number]]  (Abnormal) Collected: 10/04/21 0757    Specimen: Blood Updated: 10/04/21 0908     WBC 6.02 10*3/mm3      RBC 2.58 10*6/mm3      Hemoglobin 8.3 g/dL      Hematocrit 25.7 %      MCV 99.6 fL      MCH 32.2 pg      MCHC 32.3 g/dL      RDW 12.7 %      RDW-SD 46.1 fl      MPV 9.9 fL      Platelets 148 10*3/mm3      Neutrophil % 68.5 %      Lymphocyte % 20.4 %      Monocyte % 9.3 %      Eosinophil % 1.2 %      Basophil % 0.3 %      Immature Grans % 0.3 %      Neutrophils, Absolute 4.12 10*3/mm3      Lymphocytes, Absolute 1.23 10*3/mm3      Monocytes, Absolute 0.56 10*3/mm3      Eosinophils, Absolute 0.07 10*3/mm3      Basophils, Absolute 0.02 10*3/mm3      Immature Grans, Absolute 0.02 10*3/mm3      nRBC 0.0 /100 WBC     COVID PRE-OP / PRE-PROCEDURE SCREENING ORDER (NO ISOLATION) - Swab, Nasopharynx [[id number]]  (Normal) Collected: 10/03/21 1839    Specimen: Swab from Nasopharynx Updated: 10/04/21 0048    Narrative:      The following orders were created for panel order COVID PRE-OP / PRE-PROCEDURE SCREENING ORDER (NO ISOLATION) - Swab, Nasopharynx.  Procedure                               Abnormality         Status                     ---------                               -----------         ------                     COVID-19,APTIMA PANTHER(...[[id number]]  Normal              Final result                 Please  view results for these tests on the individual orders.    COVID-19,APTIMA PANTHER(PRAFUL),[facility], NP/OP SWAB IN UTM/VTM/SALINE TRANSPORT MEDIA,24 HR TAT - Swab, Nasopharynx [[id number]]  (Normal) Collected: 10/03/21 1839    Specimen: Swab from Nasopharynx Updated: 10/04/21 0048     COVID19 Not Detected    Narrative:      Fact sheet for providers: https://www.fda.gov/media/313262/download     Fact sheet for patients: https://www.fda.gov/media/424269/download    Test performed by RT PCR.    Urinalysis, Microscopic Only - Urine, Random Void [[id number]]  (Abnormal) Collected: 10/03/21 2002    Specimen: Urine, Random Void Updated: 10/03/21 2025     RBC, UA Too Numerous to Count /HPF      WBC, UA       Unable to determine due to loaded field     /HPF     Bacteria, UA       Unable to determine due to loaded field     /HPF     Squamous Epithelial Cells, UA       Unable to determine due to loaded field     /HPF     Hyaline Casts, UA       Unable to determine due to loaded field     /LPF     Methodology Automated Microscopy    Urine Culture - Urine, Urine, Random Void [[id number]] Collected: 10/03/21 2002    Specimen: Urine, Random Void Updated: 10/03/21 2025    Urinalysis With Culture If Indicated - Urine, Random Void [[id number]]  (Abnormal) Collected: 10/03/21 2002    Specimen: Urine, Random Void Updated: 10/03/21 2023     Color, UA Red     Comment: Any Substance that causes an abnormal urine color can alter the accuracy of the chemical reactions.        Appearance, UA Turbid     pH, UA 7.5     Specific Gravity, UA 1.010     Glucose, UA Negative     Ketones, UA 15 mg/dL (1+)     Bilirubin, UA Negative     Blood, UA Large (3+)     Protein, UA >=300 mg/dL (3+)     Leuk Esterase, UA Large (3+)     Nitrite, UA Positive     Urobilinogen, UA >=8.0 E.U./dL    Narrative:      UA performed on centrifuged urine specimen.     Protime-INR [[id number]]  (Abnormal) Collected: 10/03/21 1723    Specimen: Blood Updated: 10/03/21 1756     Protime  14.9 Seconds      INR 1.19    aPTT [[id number]]  (Abnormal) Collected: 10/03/21 1723    Specimen: Blood Updated: 10/03/21 1756     PTT 39.1 seconds     Comprehensive Metabolic Panel [[id number]]  (Abnormal) Collected: 10/03/21 1723    Specimen: Blood Updated: 10/03/21 1752     Glucose 113 mg/dL      BUN 34 mg/dL      Creatinine 1.63 mg/dL      Sodium 139 mmol/L      Potassium 5.1 mmol/L      Chloride 104 mmol/L      CO2 26.8 mmol/L      Calcium 9.5 mg/dL      Total Protein 6.6 g/dL      Albumin 3.80 g/dL      ALT (SGPT) 10 U/L      AST (SGOT) 21 U/L      Alkaline Phosphatase 61 U/L      Total Bilirubin 0.5 mg/dL      eGFR Non African Amer 39 mL/min/1.73      Globulin 2.8 gm/dL      A/G Ratio 1.4 g/dL      BUN/Creatinine Ratio 20.9     Anion Gap 8.2 mmol/L     Narrative:      GFR Normal >60  Chronic Kidney Disease <60  Kidney Failure <15      CBC & Differential [[id number]]  (Abnormal) Collected: 10/03/21 1723    Specimen: Blood Updated: 10/03/21 1736    Narrative:      The following orders were created for panel order CBC & Differential.  Procedure                               Abnormality         Status                     ---------                               -----------         ------                     CBC Auto Differential[[id number]]        Abnormal            Final result                 Please view results for these tests on the individual orders.    CBC Auto Differential [[id number]]  (Abnormal) Collected: 10/03/21 1723    Specimen: Blood Updated: 10/03/21 1736     WBC 6.50 10*3/mm3      RBC 2.95 10*6/mm3      Hemoglobin 9.4 g/dL      Hematocrit 29.5 %      .0 fL      MCH 31.9 pg      MCHC 31.9 g/dL      RDW 12.7 %      RDW-SD 46.8 fl      MPV 9.5 fL      Platelets 149 10*3/mm3      Neutrophil % 79.3 %      Lymphocyte % 10.8 %      Monocyte % 8.6 %      Eosinophil % 0.8 %      Basophil % 0.3 %      Immature Grans % 0.2 %      Neutrophils, Absolute 5.16 10*3/mm3      Lymphocytes, Absolute 0.70 10*3/mm3       Monocytes, Absolute 0.56 10*3/mm3      Eosinophils, Absolute 0.05 10*3/mm3      Basophils, Absolute 0.02 10*3/mm3      Immature Grans, Absolute 0.01 10*3/mm3      nRBC 0.0 /100 WBC            Imaging:         Assessment:       Gross hematuria  UTI      Plan:   3 way catheter  Abx  CT scan    Mikal Collier Jr., MD  10/04/21  13:11 EDT

## 2021-10-04 NOTE — PLAN OF CARE
Goal Outcome Evaluation:  Plan of Care Reviewed With: patient, daughter        Progress: no change  Outcome Summary: ED admit for gross hematuria.  VSS.  A&Ox4.  [place].  Hearing aid in L ear only.  Daughter, retired RN, at bedside.  CBI with NS at moderate rate.  Irrigated and evacuated clots several times throughout the shift.  Urology consult.  [facility]  Family brought in home meds for patient to take.  Note placed for MD to approve med rec and taking home meds.  Cont to monitor.     Addendum:  By the end of shift, patient has gone through 4 boxes of NS irrigation bag (3000 ml x 4 per box).

## 2021-10-04 NOTE — PROGRESS NOTES
Discharge Planning Assessment  [facility]     Patient Name: Vinicio Samayoa  MRN: [id number]  Today's Date: 10/4/2021    Admit Date: 10/3/2021    Discharge Needs Assessment     Row Name 10/04/21 1027       Living Environment    Lives With  child(carol), adult;spouse    Current Living Arrangements  home/apartment/condo    Primary Care Provided by  child(carol)    Family Caregiver if Needed  child(carol), adult    Family Caregiver Names  Daughter, Katelin    Quality of Family Relationships  helpful;involved;supportive    Able to Return to Prior Arrangements  yes       Resource/Environmental Concerns    Resource/Environmental Concerns  none       Transition Planning    Patient/Family Anticipates Transition to  home with family    Patient/Family Anticipated Services at Transition  none    Transportation Anticipated  family or friend will provide       Discharge Needs Assessment    Readmission Within the Last 30 Days  no previous admission in last 30 days    Equipment Currently Used at Home  walker, rolling    Concerns to be Addressed  adjustment to diagnosis/illness    Equipment Needed After Discharge  none    Provided Post Acute Provider List?  N/A    N/A Provider List Comment  no needs at this time        Discharge Plan     Row Name 10/04/21 1028       Plan    Plan  Home w/ family, no needs at this time    Patient/Family in Agreement with Plan  yes    Plan Comments  Met w/ patient and pt's daughter at the bedside, daughter/ Katelin [phone]. Pt lives w/ his spouse and daughter. Facesheet verified and updated w/ correct city ([place]). He is mostly IADL inside the home. He uses a walker and has a stair lift. Daughters provide assistance w/ all ADL outside the home, driving pt to appointments etc. PCP is Dr. Thacker, [facility], but for any new dc meds pt would like to use [facility].  Pt/daughter deny needs at this time, if HH is needed they're agreeable to use [facility]. Pt's daughterKatelin will transport  home. CCP follow progress.        Continued Care and Services - Admitted Since 10/3/2021    Coordination has not been started for this encounter.         Demographic Summary    No documentation.       Functional Status     Row Name 10/04/21 1028       Functional Status    Usual Activity Tolerance  moderate       Functional Status, IADL    Medications  assistive person    Meal Preparation  assistive person    Housekeeping  assistive person    Laundry  assistive person    Shopping  assistive equipment and person       Mental Status    General Appearance WDL  WDL       Mental Status Summary    Recent Changes in Mental Status/Cognitive Functioning  no changes        Psychosocial    No documentation.       Abuse/Neglect    No documentation.       Legal    No documentation.       Substance Abuse    No documentation.       Patient Forms    No documentation.           Samanta Gibbs RN

## 2021-10-04 NOTE — H&P
"History and physical    Primary care physician  Dr. CANCHOLA    Chief complaint  Bloody urine    History of present illness  89-year-old white male who lives at home with his wife with history of BPH chronic anemia osteoarthritis and gastroesophageal reflux disease presented to [facility] emergency room with bloody urine started day before yesterday. Patient also have dysuria and increased frequency prior to that. Patient denies any fever chest pain Hartness of breath abdominal pain nausea vomiting diarrhea. Patient work-up in ER revealed calli hematuria and UTI admit for management    PAST MEDICAL HISTORY   BPH  Osteoarthritis  Chronic anemia  Gastroesophageal reflux disease      PAST SURGICAL HISTORY     No past surgical history on file.     FAMILY HISTORY  History reviewed. No pertinent family history.     SOCIAL HISTORY                 Socioeconomic History   • Marital status:        Spouse name: Not on file   • Number of children: Not on file   • Years of education: Not on file   • Highest education level: Not on file   Tobacco Use   • Smoking status: Former Smoker   • Smokeless tobacco: Never Used   Vaping Use   • Vaping Use: Never used   Substance and Sexual Activity   • Drug use: Not Currently   • Sexual activity: Not Currently         ALLERGIES  Patient has no allergy information on record.  Home medications reviewed     REVIEW OF SYSTEMS  Constitutional: Negative for chills and fever.   Respiratory: Negative for chest tightness.    Cardiovascular: Negative for chest pain.   Gastrointestinal: Negative for abdominal pain, diarrhea, nausea and vomiting.   Genitourinary: Positive for dysuria, frequency and hematuria.      PHYSICAL EXAM   Blood pressure 115/65, pulse 82, temperature 97.1 °F (36.2 °C), temperature source Oral, resp. rate 18, height 175.3 cm (69\"), weight 68.3 kg (150 lb 9.2 oz), SpO2 94 %.    Constitutional: Pt. is awake and alert and in no distress.    HENT: Normocephalic and " atraumatic.   Neck: Normal range of motion. Neck supple. No JVD present.   Cardiovascular: Normal rate, regular rhythm and normal heart sounds. Exam reveals no gallop and no friction rub.   No murmur heard.  Pulmonary/Chest: Effort normal and breath sounds normal. No stridor. No respiratory distress. No wheezes, no rales.   Abdominal: Soft. Bowel sounds are normal. No distension. There is no tenderness. There is no rebound and no guarding.   Musculoskeletal: Age-appropriate range of motion. No edema, tenderness or deformity.   Neurological: Pt. is awake and alert.  Cranial nerves II through XII are intact.  He has no focal neurologic deficits  Skin: Skin is warm and dry. No rash noted. Pt. is not diaphoretic. No erythema.   Psychiatric: Mood, affect normal.  He is pleasant and cooperative.    LAB RESULTS  Lab Results (last 24 hours)     Procedure Component Value Units Date/Time    Basic Metabolic Panel [[id number]]  (Abnormal) Collected: 10/04/21 0757    Specimen: Blood Updated: 10/04/21 0948     Glucose 78 mg/dL      BUN 30 mg/dL      Creatinine 1.46 mg/dL      Sodium 139 mmol/L      Potassium 4.7 mmol/L      Chloride 105 mmol/L      CO2 24.8 mmol/L      Calcium 9.2 mg/dL      eGFR Non African Amer 45 mL/min/1.73      BUN/Creatinine Ratio 20.5     Anion Gap 9.2 mmol/L     Narrative:      GFR Normal >60  Chronic Kidney Disease <60  Kidney Failure <15      CBC & Differential [[id number]]  (Abnormal) Collected: 10/04/21 0757    Specimen: Blood Updated: 10/04/21 0908    Narrative:      The following orders were created for panel order CBC & Differential.  Procedure                               Abnormality         Status                     ---------                               -----------         ------                     CBC Auto Differential[[id number]]        Abnormal            Final result                 Please view results for these tests on the individual orders.    CBC Auto Differential [[id number]]   (Abnormal) Collected: 10/04/21 0757    Specimen: Blood Updated: 10/04/21 0908     WBC 6.02 10*3/mm3      RBC 2.58 10*6/mm3      Hemoglobin 8.3 g/dL      Hematocrit 25.7 %      MCV 99.6 fL      MCH 32.2 pg      MCHC 32.3 g/dL      RDW 12.7 %      RDW-SD 46.1 fl      MPV 9.9 fL      Platelets 148 10*3/mm3      Neutrophil % 68.5 %      Lymphocyte % 20.4 %      Monocyte % 9.3 %      Eosinophil % 1.2 %      Basophil % 0.3 %      Immature Grans % 0.3 %      Neutrophils, Absolute 4.12 10*3/mm3      Lymphocytes, Absolute 1.23 10*3/mm3      Monocytes, Absolute 0.56 10*3/mm3      Eosinophils, Absolute 0.07 10*3/mm3      Basophils, Absolute 0.02 10*3/mm3      Immature Grans, Absolute 0.02 10*3/mm3      nRBC 0.0 /100 WBC     COVID PRE-OP / PRE-PROCEDURE SCREENING ORDER (NO ISOLATION) - Swab, Nasopharynx [[id number]]  (Normal) Collected: 10/03/21 1839    Specimen: Swab from Nasopharynx Updated: 10/04/21 0048    Narrative:      The following orders were created for panel order COVID PRE-OP / PRE-PROCEDURE SCREENING ORDER (NO ISOLATION) - Swab, Nasopharynx.  Procedure                               Abnormality         Status                     ---------                               -----------         ------                     COVID-19,APTIMA PANTHER(...[[id number]]  Normal              Final result                 Please view results for these tests on the individual orders.    COVID-19,APTIMA PANTHER(PRAFUL), JINNY, NP/OP SWAB IN UTM/VTM/SALINE TRANSPORT MEDIA,24 HR TAT - Swab, Nasopharynx [[id number]]  (Normal) Collected: 10/03/21 1839    Specimen: Swab from Nasopharynx Updated: 10/04/21 0048     COVID19 Not Detected    Narrative:      Fact sheet for providers: https://www.fda.gov/media/349353/download     Fact sheet for patients: https://www.fda.gov/media/472385/download    Test performed by RT PCR.    Urinalysis, Microscopic Only - Urine, Random Void [[id number]]  (Abnormal) Collected: 10/03/21 2002    Specimen: Urine, Random Void  Updated: 10/03/21 2025     RBC, UA Too Numerous to Count /HPF      WBC, UA       Unable to determine due to loaded field     /HPF     Bacteria, UA       Unable to determine due to loaded field     /HPF     Squamous Epithelial Cells, UA       Unable to determine due to loaded field     /HPF     Hyaline Casts, UA       Unable to determine due to loaded field     /LPF     Methodology Automated Microscopy    Urine Culture - Urine, Urine, Random Void [[id number]] Collected: 10/03/21 2002    Specimen: Urine, Random Void Updated: 10/03/21 2025    Urinalysis With Culture If Indicated - Urine, Random Void [[id number]]  (Abnormal) Collected: 10/03/21 2002    Specimen: Urine, Random Void Updated: 10/03/21 2023     Color, UA Red     Comment: Any Substance that causes an abnormal urine color can alter the accuracy of the chemical reactions.        Appearance, UA Turbid     pH, UA 7.5     Specific Gravity, UA 1.010     Glucose, UA Negative     Ketones, UA 15 mg/dL (1+)     Bilirubin, UA Negative     Blood, UA Large (3+)     Protein, UA >=300 mg/dL (3+)     Leuk Esterase, UA Large (3+)     Nitrite, UA Positive     Urobilinogen, UA >=8.0 E.U./dL    Narrative:      UA performed on centrifuged urine specimen.     Protime-INR [[id number]]  (Abnormal) Collected: 10/03/21 1723    Specimen: Blood Updated: 10/03/21 1756     Protime 14.9 Seconds      INR 1.19    aPTT [[id number]]  (Abnormal) Collected: 10/03/21 1723    Specimen: Blood Updated: 10/03/21 1756     PTT 39.1 seconds     Comprehensive Metabolic Panel [[id number]]  (Abnormal) Collected: 10/03/21 1723    Specimen: Blood Updated: 10/03/21 1752     Glucose 113 mg/dL      BUN 34 mg/dL      Creatinine 1.63 mg/dL      Sodium 139 mmol/L      Potassium 5.1 mmol/L      Chloride 104 mmol/L      CO2 26.8 mmol/L      Calcium 9.5 mg/dL      Total Protein 6.6 g/dL      Albumin 3.80 g/dL      ALT (SGPT) 10 U/L      AST (SGOT) 21 U/L      Alkaline Phosphatase 61 U/L      Total Bilirubin 0.5 mg/dL       eGFR Non African Amer 39 mL/min/1.73      Globulin 2.8 gm/dL      A/G Ratio 1.4 g/dL      BUN/Creatinine Ratio 20.9     Anion Gap 8.2 mmol/L     Narrative:      GFR Normal >60  Chronic Kidney Disease <60  Kidney Failure <15      CBC & Differential [[id number]]  (Abnormal) Collected: 10/03/21 1723    Specimen: Blood Updated: 10/03/21 1736    Narrative:      The following orders were created for panel order CBC & Differential.  Procedure                               Abnormality         Status                     ---------                               -----------         ------                     CBC Auto Differential[[id number]]        Abnormal            Final result                 Please view results for these tests on the individual orders.    CBC Auto Differential [[id number]]  (Abnormal) Collected: 10/03/21 1723    Specimen: Blood Updated: 10/03/21 1736     WBC 6.50 10*3/mm3      RBC 2.95 10*6/mm3      Hemoglobin 9.4 g/dL      Hematocrit 29.5 %      .0 fL      MCH 31.9 pg      MCHC 31.9 g/dL      RDW 12.7 %      RDW-SD 46.8 fl      MPV 9.5 fL      Platelets 149 10*3/mm3      Neutrophil % 79.3 %      Lymphocyte % 10.8 %      Monocyte % 8.6 %      Eosinophil % 0.8 %      Basophil % 0.3 %      Immature Grans % 0.2 %      Neutrophils, Absolute 5.16 10*3/mm3      Lymphocytes, Absolute 0.70 10*3/mm3      Monocytes, Absolute 0.56 10*3/mm3      Eosinophils, Absolute 0.05 10*3/mm3      Basophils, Absolute 0.02 10*3/mm3      Immature Grans, Absolute 0.01 10*3/mm3      nRBC 0.0 /100 WBC         Imaging Results (Last 24 Hours)     ** No results found for the last 24 hours. **          Current Facility-Administered Medications:   •  cefTRIAXone (ROCEPHIN) 1 g in sodium chloride 0.9 % 100 mL IVPB-VTB, 1 g, Intravenous, Q24H, Alen Murcia MD  •  digoxin (LANOXIN) tablet 125 mcg, 125 mcg, Oral, Daily With Lunch, Alen uMrcia MD  •  famotidine (PEPCID) tablet 20 mg, 20 mg, Oral, BID, Alen Murcia MD  •  ferrous  sulfate tablet 325 mg, 325 mg, Oral, Daily With Breakfast, Evette Murcia MD, 325 mg at 10/04/21 1200  •  latanoprost (XALATAN) 0.005 % ophthalmic solution 1 drop, 1 drop, Both Eyes, Nightly, Evette Murcia MD  •  sodium chloride 0.9 % infusion, 50 mL/hr, Intravenous, Continuous, Evette Murcia MD, Last Rate: 50 mL/hr at 10/04/21 1208, 50 mL/hr at 10/04/21 1208  •  tamsulosin (FLOMAX) 24 hr capsule 0.4 mg, 0.4 mg, Oral, Nightly, Evette Murcia MD    ASSESSMENT  Acute UTI  Hematuria  BPH  Chronic anemia  Acute kidney injury  Chronic kidney disease stage III  Gastroesophageal flux disease    PLAN  Admit  IVF  IV antibiotics  CBI  Urology consult  Nephrology to follow patient  Continue home medications  Stress ulcer DVT prophylaxis  Supportive care  DNR  Discussed with family and nursing staff  Follow closely further recommendation current hospital course    EVETTE MURCIA MD

## 2021-10-05 LAB
ABO GROUP BLD: NORMAL
ALBUMIN SERPL-MCNC: 3.3 G/DL (ref 3.5–5.2)
ALBUMIN/GLOB SERPL: 1.2 G/DL
ALP SERPL-CCNC: 50 U/L (ref 39–117)
ALT SERPL W P-5'-P-CCNC: 9 U/L (ref 1–41)
ANION GAP SERPL CALCULATED.3IONS-SCNC: 9.2 MMOL/L (ref 5–15)
AST SERPL-CCNC: 19 U/L (ref 1–40)
BASOPHILS # BLD AUTO: 0.01 10*3/MM3 (ref 0–0.2)
BASOPHILS NFR BLD AUTO: 0.2 % (ref 0–1.5)
BILIRUB SERPL-MCNC: 0.6 MG/DL (ref 0–1.2)
BLD GP AB SCN SERPL QL: NEGATIVE
BUN SERPL-MCNC: 27 MG/DL (ref 8–23)
BUN/CREAT SERPL: 21.1 (ref 7–25)
CALCIUM SPEC-SCNC: 8.8 MG/DL (ref 8.2–9.6)
CHLORIDE SERPL-SCNC: 108 MMOL/L (ref 98–107)
CHOLEST SERPL-MCNC: 126 MG/DL (ref 0–200)
CO2 SERPL-SCNC: 22.8 MMOL/L (ref 22–29)
CREAT SERPL-MCNC: 1.28 MG/DL (ref 0.76–1.27)
DEPRECATED RDW RBC AUTO: 44.5 FL (ref 37–54)
DIGOXIN SERPL-MCNC: 1 NG/ML (ref 0.6–1.2)
EOSINOPHIL # BLD AUTO: 0.06 10*3/MM3 (ref 0–0.4)
EOSINOPHIL NFR BLD AUTO: 1 % (ref 0.3–6.2)
ERYTHROCYTE [DISTWIDTH] IN BLOOD BY AUTOMATED COUNT: 12.4 % (ref 12.3–15.4)
GFR SERPL CREATININE-BSD FRML MDRD: 52 ML/MIN/1.73
GLOBULIN UR ELPH-MCNC: 2.8 GM/DL
GLUCOSE SERPL-MCNC: 83 MG/DL (ref 65–99)
HBA1C MFR BLD: 5.82 % (ref 4.8–5.6)
HCT VFR BLD AUTO: 24.7 % (ref 37.5–51)
HDLC SERPL-MCNC: 48 MG/DL (ref 40–60)
HGB BLD-MCNC: 7.9 G/DL (ref 13–17.7)
IMM GRANULOCYTES # BLD AUTO: 0.02 10*3/MM3 (ref 0–0.05)
IMM GRANULOCYTES NFR BLD AUTO: 0.3 % (ref 0–0.5)
LDLC SERPL CALC-MCNC: 66 MG/DL (ref 0–100)
LDLC/HDLC SERPL: 1.41 {RATIO}
LYMPHOCYTES # BLD AUTO: 0.84 10*3/MM3 (ref 0.7–3.1)
LYMPHOCYTES NFR BLD AUTO: 13.7 % (ref 19.6–45.3)
MAGNESIUM SERPL-MCNC: 2 MG/DL (ref 1.7–2.3)
MCH RBC QN AUTO: 31.2 PG (ref 26.6–33)
MCHC RBC AUTO-ENTMCNC: 32 G/DL (ref 31.5–35.7)
MCV RBC AUTO: 97.6 FL (ref 79–97)
MONOCYTES # BLD AUTO: 0.55 10*3/MM3 (ref 0.1–0.9)
MONOCYTES NFR BLD AUTO: 8.9 % (ref 5–12)
NEUTROPHILS NFR BLD AUTO: 4.67 10*3/MM3 (ref 1.7–7)
NEUTROPHILS NFR BLD AUTO: 75.9 % (ref 42.7–76)
NRBC BLD AUTO-RTO: 0 /100 WBC (ref 0–0.2)
NT-PROBNP SERPL-MCNC: ABNORMAL PG/ML (ref 0–1800)
PHOSPHATE SERPL-MCNC: 2.8 MG/DL (ref 2.5–4.5)
PLATELET # BLD AUTO: 150 10*3/MM3 (ref 140–450)
PMV BLD AUTO: 9.7 FL (ref 6–12)
POTASSIUM SERPL-SCNC: 4.5 MMOL/L (ref 3.5–5.2)
PROT SERPL-MCNC: 6.1 G/DL (ref 6–8.5)
RBC # BLD AUTO: 2.53 10*6/MM3 (ref 4.14–5.8)
RH BLD: POSITIVE
SODIUM SERPL-SCNC: 140 MMOL/L (ref 136–145)
T&S EXPIRATION DATE: NORMAL
TRIGL SERPL-MCNC: 52 MG/DL (ref 0–150)
TSH SERPL DL<=0.05 MIU/L-ACNC: 2 UIU/ML (ref 0.27–4.2)
VLDLC SERPL-MCNC: 12 MG/DL (ref 5–40)
WBC # BLD AUTO: 6.15 10*3/MM3 (ref 3.4–10.8)

## 2021-10-05 PROCEDURE — 80053 COMPREHEN METABOLIC PANEL: CPT | Performed by: HOSPITALIST

## 2021-10-05 PROCEDURE — 86900 BLOOD TYPING SEROLOGIC ABO: CPT

## 2021-10-05 PROCEDURE — 86850 RBC ANTIBODY SCREEN: CPT | Performed by: HOSPITALIST

## 2021-10-05 PROCEDURE — 83036 HEMOGLOBIN GLYCOSYLATED A1C: CPT | Performed by: HOSPITALIST

## 2021-10-05 PROCEDURE — 84100 ASSAY OF PHOSPHORUS: CPT | Performed by: INTERNAL MEDICINE

## 2021-10-05 PROCEDURE — P9016 RBC LEUKOCYTES REDUCED: HCPCS

## 2021-10-05 PROCEDURE — 83880 ASSAY OF NATRIURETIC PEPTIDE: CPT | Performed by: HOSPITALIST

## 2021-10-05 PROCEDURE — 83735 ASSAY OF MAGNESIUM: CPT | Performed by: INTERNAL MEDICINE

## 2021-10-05 PROCEDURE — 86900 BLOOD TYPING SEROLOGIC ABO: CPT | Performed by: HOSPITALIST

## 2021-10-05 PROCEDURE — 84443 ASSAY THYROID STIM HORMONE: CPT | Performed by: HOSPITALIST

## 2021-10-05 PROCEDURE — 86901 BLOOD TYPING SEROLOGIC RH(D): CPT

## 2021-10-05 PROCEDURE — 80061 LIPID PANEL: CPT | Performed by: HOSPITALIST

## 2021-10-05 PROCEDURE — 86923 COMPATIBILITY TEST ELECTRIC: CPT

## 2021-10-05 PROCEDURE — 85025 COMPLETE CBC W/AUTO DIFF WBC: CPT | Performed by: HOSPITALIST

## 2021-10-05 PROCEDURE — 36430 TRANSFUSION BLD/BLD COMPNT: CPT

## 2021-10-05 PROCEDURE — 86901 BLOOD TYPING SEROLOGIC RH(D): CPT | Performed by: HOSPITALIST

## 2021-10-05 PROCEDURE — 25010000002 CEFTRIAXONE PER 250 MG: Performed by: HOSPITALIST

## 2021-10-05 PROCEDURE — 80162 ASSAY OF DIGOXIN TOTAL: CPT | Performed by: HOSPITALIST

## 2021-10-05 RX ADMIN — TAMSULOSIN HYDROCHLORIDE 0.4 MG: 0.4 CAPSULE ORAL at 19:52

## 2021-10-05 RX ADMIN — CEFTRIAXONE 1 G: 1 INJECTION, POWDER, FOR SOLUTION INTRAMUSCULAR; INTRAVENOUS at 17:12

## 2021-10-05 RX ADMIN — LATANOPROST 1 DROP: 50 SOLUTION OPHTHALMIC at 19:52

## 2021-10-05 RX ADMIN — FERROUS SULFATE TAB 325 MG (65 MG ELEMENTAL FE) 325 MG: 325 (65 FE) TAB at 19:51

## 2021-10-05 RX ADMIN — DIGOXIN 125 MCG: 125 TABLET ORAL at 19:53

## 2021-10-05 NOTE — NURSING NOTE
Informed consent for administration of blood products obtained at this time.  Any and all questions answered to the satisfaction of patient and family.

## 2021-10-05 NOTE — PLAN OF CARE
Goal Outcome Evaluation:  Plan of Care Reviewed With: patient        Progress: no change  Outcome Summary: CT scan showed F/C was in prostatic urethra, Dr. Christie said to deflate balloon, advance jenkins and reflate, I have irrigated 3 times thus far, pulling out large clots, running very fast, IVF, daughter at bedside

## 2021-10-05 NOTE — PROGRESS NOTES
10-5-21  Urology  Plan: cystoscopy, clot evacuation  Was planned for this evening, family wants tomorrow  The surgical case has now been rescheduled for tomorrow AM at 7:30 10-6-21

## 2021-10-05 NOTE — PLAN OF CARE
See below.    Problem: Adult Inpatient Plan of Care  Goal: Plan of Care Review  Outcome: Ongoing, Progressing  Flowsheets (Taken 10/5/2021 1518)  Progress: improving  Plan of Care Reviewed With:   patient   family  Outcome Summary: 89/M presenting with gross hematuria via ED on 10/3/2021.  ALOx4 with periods of slight confusion, RA, lungs clear but diminished, BS audible and normoactive, F/C in place with CBI currently in progress.  Up x1 BRP with walker/gait belt.  2+ pulses noted in all extremities, no c/o numbness/tingling in any extremity.  Pain well-controlled with no need for PRN pain meds at this time.  PIV saline locked.  Cystoscopy pushed until tomorrow per MD orders, NPO at midnight,.  1 unit PRBCs to be administered today once T&S obtained.  D/C planning in progress, will CTM.

## 2021-10-05 NOTE — PROGRESS NOTES
"Daily progress note    Chief complaint  Doing little better  Still bloody urine  No abdominal pain no nausea vomiting  No new complaints  Family at bedside    History of present illness  89-year-old white male who lives at home with his wife with history of BPH chronic anemia osteoarthritis and gastroesophageal reflux disease presented to [facility] emergency room with bloody urine started day before yesterday. Patient also have dysuria and increased frequency prior to that. Patient denies any fever chest pain Hartness of breath abdominal pain nausea vomiting diarrhea. Patient work-up in ER revealed calli hematuria and UTI admit for management     REVIEW OF SYSTEMS  Constitutional: Negative for chills and fever.   Respiratory: Negative for chest tightness.    Cardiovascular: Negative for chest pain.   Gastrointestinal: Negative for abdominal pain, diarrhea, nausea and vomiting.   Genitourinary: Positive for dysuria, frequency and hematuria.      PHYSICAL EXAM   Blood pressure 125/75, pulse 77, temperature 99.3 °F (37.4 °C), temperature source Oral, resp. rate 18, height 175.3 cm (69\"), weight 68.3 kg (150 lb 9.2 oz), SpO2 96 %.    Constitutional: Pt. is awake and alert and in no distress.    HENT: Normocephalic and atraumatic.   Neck: Normal range of motion. Neck supple. No JVD present.   Cardiovascular: Normal rate, regular rhythm and normal heart sounds. Exam reveals no gallop and no friction rub.   No murmur heard.  Pulmonary/Chest: Effort normal and breath sounds normal. No stridor. No respiratory distress. No wheezes, no rales.   Abdominal: Soft. Bowel sounds are normal. No distension. There is no tenderness. There is no rebound and no guarding.   Musculoskeletal: Age-appropriate range of motion. No edema, tenderness or deformity.   Neurological: Pt. is awake and alert.  Cranial nerves II through XII are intact.  He has no focal neurologic deficits  Skin: Skin is warm and dry. No rash noted. Pt. is " not diaphoretic. No erythema.   Psychiatric: Mood, affect normal.  He is pleasant and cooperative.    LAB RESULTS  Lab Results (last 24 hours)     Procedure Component Value Units Date/Time    Hemoglobin A1c [[id number]]  (Abnormal) Collected: 10/05/21 0949    Specimen: Blood Updated: 10/05/21 1250     Hemoglobin A1C 5.82 %     Narrative:      Hemoglobin A1C Ranges:    Increased Risk for Diabetes  5.7% to 6.4%  Diabetes                     >= 6.5%  Diabetic Goal                < 7.0%    TSH [[id number]]  (Normal) Collected: 10/05/21 0949    Specimen: Blood Updated: 10/05/21 1129     TSH 2.000 uIU/mL     BNP [[id number]]  (Abnormal) Collected: 10/05/21 0949    Specimen: Blood Updated: 10/05/21 1129     proBNP 14,958.0 pg/mL     Narrative:      Among patients with dyspnea, NT-proBNP is highly sensitive for the detection of acute congestive heart failure. In addition NT-proBNP of <300 pg/ml effectively rules out acute congestive heart failure with 99% negative predictive value.    Results may be falsely decreased if patient taking Biotin.      Comprehensive Metabolic Panel [[id number]]  (Abnormal) Collected: 10/05/21 0949    Specimen: Blood Updated: 10/05/21 1122     Glucose 83 mg/dL      BUN 27 mg/dL      Creatinine 1.28 mg/dL      Sodium 140 mmol/L      Potassium 4.5 mmol/L      Chloride 108 mmol/L      CO2 22.8 mmol/L      Calcium 8.8 mg/dL      Total Protein 6.1 g/dL      Albumin 3.30 g/dL      ALT (SGPT) 9 U/L      AST (SGOT) 19 U/L      Alkaline Phosphatase 50 U/L      Total Bilirubin 0.6 mg/dL      eGFR Non African Amer 52 mL/min/1.73      Globulin 2.8 gm/dL      A/G Ratio 1.2 g/dL      BUN/Creatinine Ratio 21.1     Anion Gap 9.2 mmol/L     Narrative:      GFR Normal >60  Chronic Kidney Disease <60  Kidney Failure <15      Digoxin Level [[id number]]  (Normal) Collected: 10/05/21 0949    Specimen: Blood Updated: 10/05/21 1122     Digoxin 1.00 ng/mL     Phosphorus [[id number]]  (Normal) Collected: 10/05/21 0949     Specimen: Blood Updated: 10/05/21 1122     Phosphorus 2.8 mg/dL     Lipid Panel [[id number]] Collected: 10/05/21 0949    Specimen: Blood Updated: 10/05/21 1122     Total Cholesterol 126 mg/dL      Triglycerides 52 mg/dL      HDL Cholesterol 48 mg/dL      LDL Cholesterol  66 mg/dL      VLDL Cholesterol 12 mg/dL      LDL/HDL Ratio 1.41    Narrative:      Cholesterol Reference Ranges  (U.S. Department of Health and Human Services ATP III Classifications)    Desirable          <200 mg/dL  Borderline High    200-239 mg/dL  High Risk          >240 mg/dL      Triglyceride Reference Ranges  (U.S. Department of Health and Human Services ATP III Classifications)    Normal           <150 mg/dL  Borderline High  150-199 mg/dL  High             200-499 mg/dL  Very High        >500 mg/dL    HDL Reference Ranges  (U.S. Department of Health and Human Services ATP III Classifcations)    Low     <40 mg/dl (major risk factor for CHD)  High    >60 mg/dl ('negative' risk factor for CHD)        LDL Reference Ranges  (U.S. Department of Health and Human Services ATP III Classifcations)    Optimal          <100 mg/dL  Near Optimal     100-129 mg/dL  Borderline High  130-159 mg/dL  High             160-189 mg/dL  Very High        >189 mg/dL    Magnesium [[id number]]  (Normal) Collected: 10/05/21 0949    Specimen: Blood Updated: 10/05/21 1122     Magnesium 2.0 mg/dL     CBC & Differential [[id number]]  (Abnormal) Collected: 10/05/21 0949    Specimen: Blood Updated: 10/05/21 1102    Narrative:      The following orders were created for panel order CBC & Differential.  Procedure                               Abnormality         Status                     ---------                               -----------         ------                     CBC Auto Differential[[id number]]        Abnormal            Final result                 Please view results for these tests on the individual orders.    CBC Auto Differential [[id number]]  (Abnormal) Collected:  10/05/21 0949    Specimen: Blood Updated: 10/05/21 1102     WBC 6.15 10*3/mm3      RBC 2.53 10*6/mm3      Hemoglobin 7.9 g/dL      Hematocrit 24.7 %      MCV 97.6 fL      MCH 31.2 pg      MCHC 32.0 g/dL      RDW 12.4 %      RDW-SD 44.5 fl      MPV 9.7 fL      Platelets 150 10*3/mm3      Neutrophil % 75.9 %      Lymphocyte % 13.7 %      Monocyte % 8.9 %      Eosinophil % 1.0 %      Basophil % 0.2 %      Immature Grans % 0.3 %      Neutrophils, Absolute 4.67 10*3/mm3      Lymphocytes, Absolute 0.84 10*3/mm3      Monocytes, Absolute 0.55 10*3/mm3      Eosinophils, Absolute 0.06 10*3/mm3      Basophils, Absolute 0.01 10*3/mm3      Immature Grans, Absolute 0.02 10*3/mm3      nRBC 0.0 /100 WBC     Urine Culture - Urine, Urine, Random Void [[id number]]  (Abnormal) Collected: 10/03/21 2002    Specimen: Urine, Random Void Updated: 10/05/21 1006     Urine Culture >100,000 CFU/mL Gram Positive Cocci        Imaging Results (Last 24 Hours)     Procedure Component Value Units Date/Time    CT Abdomen Pelvis With & Without Contrast [[id number]] Collected: 10/04/21 1901     Updated: 10/04/21 1917    Narrative:      CT ABDOMEN PELVIS W WO CONTRAST-     INDICATIONS: Hematuria     TECHNIQUE: Radiation dose reduction techniques were utilized, including  automated exposure control and exposure modulation based on body size.  Unenhanced and enhanced ABDOMEN AND PELVIS CT     COMPARISON: None available        FINDINGS:      Hepatic cyst is noted, as well as liver low densities too small to  characterize.     Pancreas is thinned.        Apparent gallstones in the gallbladder, slight strandy density around  the gallbladder that could be evidence of cholecystitis, correlate  clinically.     Bilateral perinephric fat stranding suggests chronic change but could be  evidence of urinary infection likewise, gas in the urinary bladder may  be from catheterization, or could be evidence of infection. Note: The  Dexter catheter balloon is inflated within  the prostatic urethra, advise  repositioning the catheter after deflating the balloon.     Nonobstructive calcifications are apparent in each kidney, as large as  about 3 mm, at least some of which appear to be arterial in location. A  left renal cyst is noted, with 3 mm calcification at the anterior margin  No hydronephrosis. Some portions of the right ureter are not opacified  on delayed postcontrast imaging, limiting assessment of the right  ureter. Likewise, the urinary bladder is not opacified by contrast  material on the delayed postcontrast images. At the left posterior  aspect of the urinary bladder, for example axial image 194, DICOM series  6, 1.5 cm nodular density may be related to prostatic impression or  urinary bladder wall lesion, direct visualization can be obtained.     Otherwise unremarkable unenhanced appearance of the liver, spleen,  adrenal glands, pancreas, kidneys, bladder.     No bowel obstruction or abnormal bowel thickening is identified. Colonic  diverticula are seen that do not appear inflamed. The appendix does not  appear inflamed.     No free intraperitoneal gas or free fluid.     Scattered small mesenteric and para-aortic lymph nodes are seen that are  not significant by size criteria.     Abdominal aorta shows focal bulging along the posterior margin,  measuring overall as much as 2.7 cm on axial image 82. Aortic and other  arterial calcifications are present.     The lung bases show small atelectasis, minimal pleural effusions.     Degenerative changes are seen in the spine. No acute fracture is  identified.             Impression:            1. At the left posterior aspect of the urinary bladder, 1.5 cm nodular  density may be related to prostatic impression or urinary bladder wall  lesion, direct visualization can be obtained. Nonobstructive  calcifications in each kidney, likely at least in part arterial in  location. Bilateral perinephric stranding, gas and urinary  bladder,  correlate clinically to exclude possibility of urinary infection. Dexter  catheter balloon is inflated in the prostatic urethra, recommend  repositioning the catheter.  2. Gallstones in the gallbladder, with slight strandy density adjacent  to the gallbladder, could be evidence of cholecystitis, correlate  clinically.  3. Colonic diverticulosis. No acute inflammatory process of bowel is  identified, follow-up as indications persist.  4. Focal bulge along the posterior margin of the abdominal aorta.     Discussed by telephone with patient's nurse, Stephanie, at time of  interpretation, 1908, 10/04/2021.        This report was finalized on 10/4/2021 7:14 PM by Dr. Andrea Redd M.D.             Current Facility-Administered Medications:   •  cefTRIAXone (ROCEPHIN) 1 g in sodium chloride 0.9 % 100 mL IVPB-VTB, 1 g, Intravenous, Q24H, Alen Murcia MD, Last Rate: 200 mL/hr at 10/04/21 1742, 1 g at 10/04/21 1742  •  digoxin (LANOXIN) tablet 125 mcg, 125 mcg, Oral, Daily With Lunch, Alen Murcia MD, 125 mcg at 10/04/21 1256  •  famotidine (PEPCID) tablet 20 mg, 20 mg, Oral, BID, Alen Murcia MD  •  ferrous sulfate tablet 325 mg, 325 mg, Oral, Daily With Breakfast, Alen Murcia MD, 325 mg at 10/04/21 1200  •  latanoprost (XALATAN) 0.005 % ophthalmic solution 1 drop, 1 drop, Both Eyes, Nightly, Alen Murcia MD, 1 drop at 10/04/21 2032  •  tamsulosin (FLOMAX) 24 hr capsule 0.4 mg, 0.4 mg, Oral, Nightly, Alen Murcia MD, 0.4 mg at 10/04/21 2032    ASSESSMENT  Acute GPC UTI  Hematuria  BPH  Chronic anemia with drop in H&H  Acute kidney injury  Chronic kidney disease stage III  Gastroesophageal flux disease    PLAN  CPM  IVF  IV antibiotics  CBI  Transfuse 1 unit packed RBC  Cystoscopy with clot evacuation today  Urology consult appreciated  Nephrology to follow patient  Continue home medications  Stress ulcer DVT prophylaxis  Supportive care  DNR  Discussed with family and nursing staff  Follow closely further  recommendation current hospital course    EVETTE PEREZ MD

## 2021-10-05 NOTE — PROGRESS NOTES
"   LOS: 2 days   Patient Care Team:  Adolfo Thacker MD as PCP - General (Internal Medicine)      Subjective   Interval History: hematuria  uti  Retention  Clot in bladder    Objective         Vital Signs  Temp:  [97 °F (36.1 °C)-99.3 °F (37.4 °C)] 99.3 °F (37.4 °C)  Heart Rate:  [50-82] 77  Resp:  [16-20] 18  BP: (100-141)/(52-82) 125/75      Intake/Output Summary (Last 24 hours) at 10/5/2021 0753  Last data filed at 10/5/2021 0600  Gross per 24 hour   Intake 46091 ml   Output 90654 ml   Net -9440 ml       Flowsheet Rows      First Filed Value   Admission Height  175.3 cm (69\") Documented at 10/03/2021 2104   Admission Weight  68.3 kg (150 lb 9.2 oz) Documented at 10/03/2021 2104          Physical Exam:     General velvet: alert, cooperative, oriented  Lungs:  Clear, No acute distress  Heart:  Regular, rate and rhythm  ABD:  Soft and nt  Genitalia:  normal  Extremities: normal, no edema  Skin:  Skin color, texture, turgor normal, no rashes        Results Review:      Lab Results (all)     Procedure Component Value Units Date/Time    Basic Metabolic Panel [[id number]]  (Abnormal) Collected: 10/04/21 0757    Specimen: Blood Updated: 10/04/21 0948     Glucose 78 mg/dL      BUN 30 mg/dL      Creatinine 1.46 mg/dL      Sodium 139 mmol/L      Potassium 4.7 mmol/L      Chloride 105 mmol/L      CO2 24.8 mmol/L      Calcium 9.2 mg/dL      eGFR Non African Amer 45 mL/min/1.73      BUN/Creatinine Ratio 20.5     Anion Gap 9.2 mmol/L     Narrative:      GFR Normal >60  Chronic Kidney Disease <60  Kidney Failure <15      CBC & Differential [[id number]]  (Abnormal) Collected: 10/04/21 0757    Specimen: Blood Updated: 10/04/21 0908    Narrative:      The following orders were created for panel order CBC & Differential.  Procedure                               Abnormality         Status                     ---------                               -----------         ------                     CBC Auto Differential[[id number]]        " Abnormal            Final result                 Please view results for these tests on the individual orders.    CBC Auto Differential [[id number]]  (Abnormal) Collected: 10/04/21 0757    Specimen: Blood Updated: 10/04/21 0908     WBC 6.02 10*3/mm3      RBC 2.58 10*6/mm3      Hemoglobin 8.3 g/dL      Hematocrit 25.7 %      MCV 99.6 fL      MCH 32.2 pg      MCHC 32.3 g/dL      RDW 12.7 %      RDW-SD 46.1 fl      MPV 9.9 fL      Platelets 148 10*3/mm3      Neutrophil % 68.5 %      Lymphocyte % 20.4 %      Monocyte % 9.3 %      Eosinophil % 1.2 %      Basophil % 0.3 %      Immature Grans % 0.3 %      Neutrophils, Absolute 4.12 10*3/mm3      Lymphocytes, Absolute 1.23 10*3/mm3      Monocytes, Absolute 0.56 10*3/mm3      Eosinophils, Absolute 0.07 10*3/mm3      Basophils, Absolute 0.02 10*3/mm3      Immature Grans, Absolute 0.02 10*3/mm3      nRBC 0.0 /100 WBC     COVID PRE-OP / PRE-PROCEDURE SCREENING ORDER (NO ISOLATION) - Swab, Nasopharynx [[id number]]  (Normal) Collected: 10/03/21 1839    Specimen: Swab from Nasopharynx Updated: 10/04/21 0048    Narrative:      The following orders were created for panel order COVID PRE-OP / PRE-PROCEDURE SCREENING ORDER (NO ISOLATION) - Swab, Nasopharynx.  Procedure                               Abnormality         Status                     ---------                               -----------         ------                     COVID-19,APTIMA PANTHER(...[[id number]]  Normal              Final result                 Please view results for these tests on the individual orders.    COVID-19,APTIMA PANTHER(PRAFUL), JINNY, NP/OP SWAB IN UTM/VTM/SALINE TRANSPORT MEDIA,24 HR TAT - Swab, Nasopharynx [[id number]]  (Normal) Collected: 10/03/21 1839    Specimen: Swab from Nasopharynx Updated: 10/04/21 0048     COVID19 Not Detected    Narrative:      Fact sheet for providers: https://www.fda.gov/media/017321/download     Fact sheet for patients: https://www.fda.gov/media/668144/download    Test  performed by RT PCR.    Urinalysis, Microscopic Only - Urine, Random Void [[id number]]  (Abnormal) Collected: 10/03/21 2002    Specimen: Urine, Random Void Updated: 10/03/21 2025     RBC, UA Too Numerous to Count /HPF      WBC, UA       Unable to determine due to loaded field     /HPF     Bacteria, UA       Unable to determine due to loaded field     /HPF     Squamous Epithelial Cells, UA       Unable to determine due to loaded field     /HPF     Hyaline Casts, UA       Unable to determine due to loaded field     /LPF     Methodology Automated Microscopy    Urine Culture - Urine, Urine, Random Void [[id number]] Collected: 10/03/21 2002    Specimen: Urine, Random Void Updated: 10/03/21 2025    Urinalysis With Culture If Indicated - Urine, Random Void [[id number]]  (Abnormal) Collected: 10/03/21 2002    Specimen: Urine, Random Void Updated: 10/03/21 2023     Color, UA Red     Comment: Any Substance that causes an abnormal urine color can alter the accuracy of the chemical reactions.        Appearance, UA Turbid     pH, UA 7.5     Specific Gravity, UA 1.010     Glucose, UA Negative     Ketones, UA 15 mg/dL (1+)     Bilirubin, UA Negative     Blood, UA Large (3+)     Protein, UA >=300 mg/dL (3+)     Leuk Esterase, UA Large (3+)     Nitrite, UA Positive     Urobilinogen, UA >=8.0 E.U./dL    Narrative:      UA performed on centrifuged urine specimen.     Protime-INR [[id number]]  (Abnormal) Collected: 10/03/21 1723    Specimen: Blood Updated: 10/03/21 1756     Protime 14.9 Seconds      INR 1.19    aPTT [[id number]]  (Abnormal) Collected: 10/03/21 1723    Specimen: Blood Updated: 10/03/21 1756     PTT 39.1 seconds     Comprehensive Metabolic Panel [[id number]]  (Abnormal) Collected: 10/03/21 1723    Specimen: Blood Updated: 10/03/21 1752     Glucose 113 mg/dL      BUN 34 mg/dL      Creatinine 1.63 mg/dL      Sodium 139 mmol/L      Potassium 5.1 mmol/L      Chloride 104 mmol/L      CO2 26.8 mmol/L      Calcium 9.5 mg/dL       Total Protein 6.6 g/dL      Albumin 3.80 g/dL      ALT (SGPT) 10 U/L      AST (SGOT) 21 U/L      Alkaline Phosphatase 61 U/L      Total Bilirubin 0.5 mg/dL      eGFR Non African Amer 39 mL/min/1.73      Globulin 2.8 gm/dL      A/G Ratio 1.4 g/dL      BUN/Creatinine Ratio 20.9     Anion Gap 8.2 mmol/L     Narrative:      GFR Normal >60  Chronic Kidney Disease <60  Kidney Failure <15      CBC & Differential [[id number]]  (Abnormal) Collected: 10/03/21 1723    Specimen: Blood Updated: 10/03/21 1736    Narrative:      The following orders were created for panel order CBC & Differential.  Procedure                               Abnormality         Status                     ---------                               -----------         ------                     CBC Auto Differential[[id number]]        Abnormal            Final result                 Please view results for these tests on the individual orders.    CBC Auto Differential [[id number]]  (Abnormal) Collected: 10/03/21 1723    Specimen: Blood Updated: 10/03/21 1736     WBC 6.50 10*3/mm3      RBC 2.95 10*6/mm3      Hemoglobin 9.4 g/dL      Hematocrit 29.5 %      .0 fL      MCH 31.9 pg      MCHC 31.9 g/dL      RDW 12.7 %      RDW-SD 46.8 fl      MPV 9.5 fL      Platelets 149 10*3/mm3      Neutrophil % 79.3 %      Lymphocyte % 10.8 %      Monocyte % 8.6 %      Eosinophil % 0.8 %      Basophil % 0.3 %      Immature Grans % 0.2 %      Neutrophils, Absolute 5.16 10*3/mm3      Lymphocytes, Absolute 0.70 10*3/mm3      Monocytes, Absolute 0.56 10*3/mm3      Eosinophils, Absolute 0.05 10*3/mm3      Basophils, Absolute 0.02 10*3/mm3      Immature Grans, Absolute 0.01 10*3/mm3      nRBC 0.0 /100 WBC           Imaging Results (All)     Procedure Component Value Units Date/Time    CT Abdomen Pelvis With & Without Contrast [[id number]] Collected: 10/04/21 1901     Updated: 10/04/21 1917    Narrative:      CT ABDOMEN PELVIS W WO CONTRAST-     INDICATIONS: Hematuria      TECHNIQUE: Radiation dose reduction techniques were utilized, including  automated exposure control and exposure modulation based on body size.  Unenhanced and enhanced ABDOMEN AND PELVIS CT     COMPARISON: None available        FINDINGS:      Hepatic cyst is noted, as well as liver low densities too small to  characterize.     Pancreas is thinned.        Apparent gallstones in the gallbladder, slight strandy density around  the gallbladder that could be evidence of cholecystitis, correlate  clinically.     Bilateral perinephric fat stranding suggests chronic change but could be  evidence of urinary infection likewise, gas in the urinary bladder may  be from catheterization, or could be evidence of infection. Note: The  Dexter catheter balloon is inflated within the prostatic urethra, advise  repositioning the catheter after deflating the balloon.     Nonobstructive calcifications are apparent in each kidney, as large as  about 3 mm, at least some of which appear to be arterial in location. A  left renal cyst is noted, with 3 mm calcification at the anterior margin  No hydronephrosis. Some portions of the right ureter are not opacified  on delayed postcontrast imaging, limiting assessment of the right  ureter. Likewise, the urinary bladder is not opacified by contrast  material on the delayed postcontrast images. At the left posterior  aspect of the urinary bladder, for example axial image 194, DICOM series  6, 1.5 cm nodular density may be related to prostatic impression or  urinary bladder wall lesion, direct visualization can be obtained.     Otherwise unremarkable unenhanced appearance of the liver, spleen,  adrenal glands, pancreas, kidneys, bladder.     No bowel obstruction or abnormal bowel thickening is identified. Colonic  diverticula are seen that do not appear inflamed. The appendix does not  appear inflamed.     No free intraperitoneal gas or free fluid.     Scattered small mesenteric and para-aortic  lymph nodes are seen that are  not significant by size criteria.     Abdominal aorta shows focal bulging along the posterior margin,  measuring overall as much as 2.7 cm on axial image 82. Aortic and other  arterial calcifications are present.     The lung bases show small atelectasis, minimal pleural effusions.     Degenerative changes are seen in the spine. No acute fracture is  identified.             Impression:            1. At the left posterior aspect of the urinary bladder, 1.5 cm nodular  density may be related to prostatic impression or urinary bladder wall  lesion, direct visualization can be obtained. Nonobstructive  calcifications in each kidney, likely at least in part arterial in  location. Bilateral perinephric stranding, gas and urinary bladder,  correlate clinically to exclude possibility of urinary infection. Dexter  catheter balloon is inflated in the prostatic urethra, recommend  repositioning the catheter.  2. Gallstones in the gallbladder, with slight strandy density adjacent  to the gallbladder, could be evidence of cholecystitis, correlate  clinically.  3. Colonic diverticulosis. No acute inflammatory process of bowel is  identified, follow-up as indications persist.  4. Focal bulge along the posterior margin of the abdominal aorta.     Discussed by telephone with patient's nurse, Stephanie, at time of  interpretation, 1908, 10/04/2021.        This report was finalized on 10/4/2021 7:14 PM by Dr. Andrea Redd M.D.             Medication Review:   Current Facility-Administered Medications   Medication Dose Route Frequency Provider Last Rate Last Admin   • cefTRIAXone (ROCEPHIN) 1 g in sodium chloride 0.9 % 100 mL IVPB-VTB  1 g Intravenous Q24H Alen Murcia  mL/hr at 10/04/21 1742 1 g at 10/04/21 1742   • digoxin (LANOXIN) tablet 125 mcg  125 mcg Oral Daily With Lunch Alen Murcia MD   125 mcg at 10/04/21 1256   • famotidine (PEPCID) tablet 20 mg  20 mg Oral BID Alen Murcia MD        • ferrous sulfate tablet 325 mg  325 mg Oral Daily With Breakfast Alen Murcia MD   325 mg at 10/04/21 1200   • latanoprost (XALATAN) 0.005 % ophthalmic solution 1 drop  1 drop Both Eyes Nightly Alen Murcia MD   1 drop at 10/04/21 2032   • sodium chloride 0.9 % infusion  75 mL/hr Intravenous Continuous Alen Murcia MD 75 mL/hr at 10/04/21 1301 75 mL/hr at 10/04/21 1301   • tamsulosin (FLOMAX) 24 hr capsule 0.4 mg  0.4 mg Oral Nightly Alen Murcia MD   0.4 mg at 10/04/21 2032       Current Facility-Administered Medications:   •  cefTRIAXone (ROCEPHIN) 1 g in sodium chloride 0.9 % 100 mL IVPB-VTB, 1 g, Intravenous, Q24H, Alen Murcia MD, Last Rate: 200 mL/hr at 10/04/21 1742, 1 g at 10/04/21 1742  •  digoxin (LANOXIN) tablet 125 mcg, 125 mcg, Oral, Daily With Lunch, Alen Murcia MD, 125 mcg at 10/04/21 1256  •  famotidine (PEPCID) tablet 20 mg, 20 mg, Oral, BID, Alen Murcia MD  •  ferrous sulfate tablet 325 mg, 325 mg, Oral, Daily With Breakfast, Alen Murcia MD, 325 mg at 10/04/21 1200  •  latanoprost (XALATAN) 0.005 % ophthalmic solution 1 drop, 1 drop, Both Eyes, Nightly, Alen Murcia MD, 1 drop at 10/04/21 2032  •  sodium chloride 0.9 % infusion, 75 mL/hr, Intravenous, Continuous, Alen Murcia MD, Last Rate: 75 mL/hr at 10/04/21 1301, 75 mL/hr at 10/04/21 1301  •  tamsulosin (FLOMAX) 24 hr capsule 0.4 mg, 0.4 mg, Oral, Nightly, Alen Murcia MD, 0.4 mg at 10/04/21 2032  Medications Discontinued During This Encounter   Medication Reason   • cefTRIAXone (ROCEPHIN) 1 g in sodium chloride 0.9 % 100 mL IVPB-VTB        Assessment/Plan   retention  UTI    Gross hematuria        Plan Abx  Cystoscopy with clot evacuation today    Mikal Collier Jr., MD  10/05/21  07:53 EDT

## 2021-10-06 ENCOUNTER — ANESTHESIA (OUTPATIENT)
Dept: PERIOP | Facility: HOSPITAL | Age: 86
End: 2021-10-06

## 2021-10-06 ENCOUNTER — ANESTHESIA EVENT (OUTPATIENT)
Dept: PERIOP | Facility: HOSPITAL | Age: 86
End: 2021-10-06

## 2021-10-06 LAB
ANION GAP SERPL CALCULATED.3IONS-SCNC: 7.5 MMOL/L (ref 5–15)
BACTERIA SPEC AEROBE CULT: ABNORMAL
BASOPHILS # BLD AUTO: 0.03 10*3/MM3 (ref 0–0.2)
BASOPHILS NFR BLD AUTO: 0.4 % (ref 0–1.5)
BH BB BLOOD EXPIRATION DATE: NORMAL
BH BB BLOOD TYPE BARCODE: 7300
BH BB DISPENSE STATUS: NORMAL
BH BB PRODUCT CODE: NORMAL
BH BB UNIT NUMBER: NORMAL
BUN SERPL-MCNC: 26 MG/DL (ref 8–23)
BUN/CREAT SERPL: 20.5 (ref 7–25)
CALCIUM SPEC-SCNC: 8.8 MG/DL (ref 8.2–9.6)
CHLORIDE SERPL-SCNC: 106 MMOL/L (ref 98–107)
CO2 SERPL-SCNC: 24.5 MMOL/L (ref 22–29)
CREAT SERPL-MCNC: 1.27 MG/DL (ref 0.76–1.27)
CROSSMATCH INTERPRETATION: NORMAL
DEPRECATED RDW RBC AUTO: 49.3 FL (ref 37–54)
EOSINOPHIL # BLD AUTO: 0.09 10*3/MM3 (ref 0–0.4)
EOSINOPHIL NFR BLD AUTO: 1.2 % (ref 0.3–6.2)
ERYTHROCYTE [DISTWIDTH] IN BLOOD BY AUTOMATED COUNT: 14.5 % (ref 12.3–15.4)
GFR SERPL CREATININE-BSD FRML MDRD: 53 ML/MIN/1.73
GLUCOSE SERPL-MCNC: 101 MG/DL (ref 65–99)
HCT VFR BLD AUTO: 25.8 % (ref 37.5–51)
HGB BLD-MCNC: 8.7 G/DL (ref 13–17.7)
IMM GRANULOCYTES # BLD AUTO: 0.04 10*3/MM3 (ref 0–0.05)
IMM GRANULOCYTES NFR BLD AUTO: 0.6 % (ref 0–0.5)
LYMPHOCYTES # BLD AUTO: 1.33 10*3/MM3 (ref 0.7–3.1)
LYMPHOCYTES NFR BLD AUTO: 18.3 % (ref 19.6–45.3)
MCH RBC QN AUTO: 31.5 PG (ref 26.6–33)
MCHC RBC AUTO-ENTMCNC: 33.7 G/DL (ref 31.5–35.7)
MCV RBC AUTO: 93.5 FL (ref 79–97)
MONOCYTES # BLD AUTO: 0.76 10*3/MM3 (ref 0.1–0.9)
MONOCYTES NFR BLD AUTO: 10.5 % (ref 5–12)
NEUTROPHILS NFR BLD AUTO: 5 10*3/MM3 (ref 1.7–7)
NEUTROPHILS NFR BLD AUTO: 69 % (ref 42.7–76)
NRBC BLD AUTO-RTO: 0 /100 WBC (ref 0–0.2)
PLATELET # BLD AUTO: 140 10*3/MM3 (ref 140–450)
PMV BLD AUTO: 9.8 FL (ref 6–12)
POTASSIUM SERPL-SCNC: 4.2 MMOL/L (ref 3.5–5.2)
PSA SERPL-MCNC: 36.9 NG/ML (ref 0–4)
QT INTERVAL: 409 MS
RBC # BLD AUTO: 2.76 10*6/MM3 (ref 4.14–5.8)
SODIUM SERPL-SCNC: 138 MMOL/L (ref 136–145)
UNIT  ABO: NORMAL
UNIT  RH: NORMAL
WBC # BLD AUTO: 7.25 10*3/MM3 (ref 3.4–10.8)

## 2021-10-06 PROCEDURE — 87040 BLOOD CULTURE FOR BACTERIA: CPT | Performed by: INTERNAL MEDICINE

## 2021-10-06 PROCEDURE — 94799 UNLISTED PULMONARY SVC/PX: CPT

## 2021-10-06 PROCEDURE — 93010 ELECTROCARDIOGRAM REPORT: CPT | Performed by: INTERNAL MEDICINE

## 2021-10-06 PROCEDURE — 93005 ELECTROCARDIOGRAM TRACING: CPT | Performed by: ANESTHESIOLOGY

## 2021-10-06 PROCEDURE — 80048 BASIC METABOLIC PNL TOTAL CA: CPT | Performed by: HOSPITALIST

## 2021-10-06 PROCEDURE — 85025 COMPLETE CBC W/AUTO DIFF WBC: CPT | Performed by: HOSPITALIST

## 2021-10-06 PROCEDURE — 0TCB8ZZ EXTIRPATION OF MATTER FROM BLADDER, VIA NATURAL OR ARTIFICIAL OPENING ENDOSCOPIC: ICD-10-PCS | Performed by: UROLOGY

## 2021-10-06 PROCEDURE — 25010000003 CEFAZOLIN IN DEXTROSE 2-4 GM/100ML-% SOLUTION: Performed by: INTERNAL MEDICINE

## 2021-10-06 PROCEDURE — 25010000002 PHENYLEPHRINE 10 MG/ML SOLUTION: Performed by: NURSE ANESTHETIST, CERTIFIED REGISTERED

## 2021-10-06 PROCEDURE — 0W3R8ZZ CONTROL BLEEDING IN GENITOURINARY TRACT, VIA NATURAL OR ARTIFICIAL OPENING ENDOSCOPIC: ICD-10-PCS | Performed by: UROLOGY

## 2021-10-06 PROCEDURE — 25010000002 PROPOFOL 10 MG/ML EMULSION: Performed by: NURSE ANESTHETIST, CERTIFIED REGISTERED

## 2021-10-06 PROCEDURE — 84153 ASSAY OF PSA TOTAL: CPT | Performed by: INTERNAL MEDICINE

## 2021-10-06 RX ORDER — FAMOTIDINE 10 MG/ML
20 INJECTION, SOLUTION INTRAVENOUS
Status: COMPLETED | OUTPATIENT
Start: 2021-10-06 | End: 2021-10-06

## 2021-10-06 RX ORDER — SODIUM CHLORIDE 0.9 % (FLUSH) 0.9 %
10 SYRINGE (ML) INJECTION EVERY 12 HOURS SCHEDULED
Status: DISCONTINUED | OUTPATIENT
Start: 2021-10-06 | End: 2021-10-06

## 2021-10-06 RX ORDER — PROPOFOL 10 MG/ML
VIAL (ML) INTRAVENOUS AS NEEDED
Status: DISCONTINUED | OUTPATIENT
Start: 2021-10-06 | End: 2021-10-06 | Stop reason: SURG

## 2021-10-06 RX ORDER — SODIUM CHLORIDE, SODIUM LACTATE, POTASSIUM CHLORIDE, CALCIUM CHLORIDE 600; 310; 30; 20 MG/100ML; MG/100ML; MG/100ML; MG/100ML
9 INJECTION, SOLUTION INTRAVENOUS CONTINUOUS PRN
Status: DISCONTINUED | OUTPATIENT
Start: 2021-10-06 | End: 2021-10-06 | Stop reason: HOSPADM

## 2021-10-06 RX ORDER — HYDRALAZINE HYDROCHLORIDE 20 MG/ML
5 INJECTION INTRAMUSCULAR; INTRAVENOUS
Status: DISCONTINUED | OUTPATIENT
Start: 2021-10-06 | End: 2021-10-06

## 2021-10-06 RX ORDER — CEFAZOLIN SODIUM 2 G/100ML
2 INJECTION, SOLUTION INTRAVENOUS EVERY 8 HOURS
Status: DISCONTINUED | OUTPATIENT
Start: 2021-10-06 | End: 2021-10-07

## 2021-10-06 RX ORDER — HYDROCODONE BITARTRATE AND ACETAMINOPHEN 7.5; 325 MG/1; MG/1
1 TABLET ORAL ONCE AS NEEDED
Status: DISCONTINUED | OUTPATIENT
Start: 2021-10-06 | End: 2021-10-06

## 2021-10-06 RX ORDER — FENTANYL CITRATE 50 UG/ML
50 INJECTION, SOLUTION INTRAMUSCULAR; INTRAVENOUS
Status: DISCONTINUED | OUTPATIENT
Start: 2021-10-06 | End: 2021-10-06

## 2021-10-06 RX ORDER — LABETALOL HYDROCHLORIDE 5 MG/ML
5 INJECTION, SOLUTION INTRAVENOUS
Status: DISCONTINUED | OUTPATIENT
Start: 2021-10-06 | End: 2021-10-06

## 2021-10-06 RX ORDER — ACETAMINOPHEN 325 MG/1
650 TABLET ORAL EVERY 6 HOURS PRN
Status: DISCONTINUED | OUTPATIENT
Start: 2021-10-06 | End: 2021-10-06

## 2021-10-06 RX ORDER — IBUPROFEN 600 MG/1
600 TABLET ORAL ONCE AS NEEDED
Status: DISCONTINUED | OUTPATIENT
Start: 2021-10-06 | End: 2021-10-06

## 2021-10-06 RX ORDER — SODIUM CHLORIDE, SODIUM LACTATE, POTASSIUM CHLORIDE, CALCIUM CHLORIDE 600; 310; 30; 20 MG/100ML; MG/100ML; MG/100ML; MG/100ML
INJECTION, SOLUTION INTRAVENOUS CONTINUOUS PRN
Status: DISCONTINUED | OUTPATIENT
Start: 2021-10-06 | End: 2021-10-06 | Stop reason: SURG

## 2021-10-06 RX ORDER — DIPHENHYDRAMINE HYDROCHLORIDE 50 MG/ML
12.5 INJECTION INTRAMUSCULAR; INTRAVENOUS
Status: DISCONTINUED | OUTPATIENT
Start: 2021-10-06 | End: 2021-10-06

## 2021-10-06 RX ORDER — DIPHENHYDRAMINE HCL 25 MG
25 CAPSULE ORAL
Status: DISCONTINUED | OUTPATIENT
Start: 2021-10-06 | End: 2021-10-06

## 2021-10-06 RX ORDER — ACETAMINOPHEN 325 MG/1
650 TABLET ORAL EVERY 4 HOURS PRN
Status: DISCONTINUED | OUTPATIENT
Start: 2021-10-06 | End: 2021-10-09 | Stop reason: HOSPADM

## 2021-10-06 RX ORDER — OXYCODONE AND ACETAMINOPHEN 10; 325 MG/1; MG/1
1 TABLET ORAL EVERY 4 HOURS PRN
Status: DISCONTINUED | OUTPATIENT
Start: 2021-10-06 | End: 2021-10-06

## 2021-10-06 RX ORDER — PROMETHAZINE HYDROCHLORIDE 25 MG/1
25 SUPPOSITORY RECTAL ONCE AS NEEDED
Status: DISCONTINUED | OUTPATIENT
Start: 2021-10-06 | End: 2021-10-06

## 2021-10-06 RX ORDER — HYDROMORPHONE HYDROCHLORIDE 1 MG/ML
0.5 INJECTION, SOLUTION INTRAMUSCULAR; INTRAVENOUS; SUBCUTANEOUS
Status: DISCONTINUED | OUTPATIENT
Start: 2021-10-06 | End: 2021-10-06

## 2021-10-06 RX ORDER — SODIUM CHLORIDE 0.9 % (FLUSH) 0.9 %
10 SYRINGE (ML) INJECTION AS NEEDED
Status: DISCONTINUED | OUTPATIENT
Start: 2021-10-06 | End: 2021-10-06

## 2021-10-06 RX ORDER — NALOXONE HCL 0.4 MG/ML
0.2 VIAL (ML) INJECTION AS NEEDED
Status: DISCONTINUED | OUTPATIENT
Start: 2021-10-06 | End: 2021-10-06

## 2021-10-06 RX ORDER — BUPIVACAINE HYDROCHLORIDE 7.5 MG/ML
INJECTION, SOLUTION EPIDURAL; RETROBULBAR
Status: COMPLETED | OUTPATIENT
Start: 2021-10-06 | End: 2021-10-06

## 2021-10-06 RX ORDER — ONDANSETRON 2 MG/ML
4 INJECTION INTRAMUSCULAR; INTRAVENOUS ONCE AS NEEDED
Status: DISCONTINUED | OUTPATIENT
Start: 2021-10-06 | End: 2021-10-06

## 2021-10-06 RX ORDER — PHENYLEPHRINE HYDROCHLORIDE 10 MG/ML
INJECTION INTRAVENOUS AS NEEDED
Status: DISCONTINUED | OUTPATIENT
Start: 2021-10-06 | End: 2021-10-06 | Stop reason: SURG

## 2021-10-06 RX ORDER — FLUMAZENIL 0.1 MG/ML
0.2 INJECTION INTRAVENOUS AS NEEDED
Status: DISCONTINUED | OUTPATIENT
Start: 2021-10-06 | End: 2021-10-06

## 2021-10-06 RX ORDER — EPHEDRINE SULFATE 50 MG/ML
5 INJECTION, SOLUTION INTRAVENOUS ONCE AS NEEDED
Status: DISCONTINUED | OUTPATIENT
Start: 2021-10-06 | End: 2021-10-06

## 2021-10-06 RX ORDER — MAGNESIUM HYDROXIDE 1200 MG/15ML
LIQUID ORAL AS NEEDED
Status: DISCONTINUED | OUTPATIENT
Start: 2021-10-06 | End: 2021-10-06 | Stop reason: HOSPADM

## 2021-10-06 RX ORDER — PROMETHAZINE HYDROCHLORIDE 25 MG/1
25 TABLET ORAL ONCE AS NEEDED
Status: DISCONTINUED | OUTPATIENT
Start: 2021-10-06 | End: 2021-10-06

## 2021-10-06 RX ORDER — PROPOFOL 10 MG/ML
VIAL (ML) INTRAVENOUS CONTINUOUS PRN
Status: DISCONTINUED | OUTPATIENT
Start: 2021-10-06 | End: 2021-10-06 | Stop reason: SURG

## 2021-10-06 RX ORDER — LIDOCAINE HYDROCHLORIDE 20 MG/ML
INJECTION, SOLUTION INFILTRATION; PERINEURAL AS NEEDED
Status: DISCONTINUED | OUTPATIENT
Start: 2021-10-06 | End: 2021-10-06 | Stop reason: SURG

## 2021-10-06 RX ADMIN — ACETAMINOPHEN 650 MG: 325 TABLET, FILM COATED ORAL at 19:25

## 2021-10-06 RX ADMIN — PHENYLEPHRINE HYDROCHLORIDE 100 MCG: 10 INJECTION, SOLUTION INTRAVENOUS at 08:33

## 2021-10-06 RX ADMIN — Medication 30 MG: at 08:02

## 2021-10-06 RX ADMIN — CEFAZOLIN SODIUM 2 G: 2 INJECTION, SOLUTION INTRAVENOUS at 23:28

## 2021-10-06 RX ADMIN — TAMSULOSIN HYDROCHLORIDE 0.4 MG: 0.4 CAPSULE ORAL at 21:35

## 2021-10-06 RX ADMIN — PHENYLEPHRINE HYDROCHLORIDE 200 MCG: 10 INJECTION, SOLUTION INTRAVENOUS at 08:35

## 2021-10-06 RX ADMIN — BUPIVACAINE HYDROCHLORIDE 1.6 ML: 7.5 INJECTION, SOLUTION EPIDURAL; RETROBULBAR at 07:59

## 2021-10-06 RX ADMIN — SODIUM CHLORIDE, POTASSIUM CHLORIDE, SODIUM LACTATE AND CALCIUM CHLORIDE: 600; 310; 30; 20 INJECTION, SOLUTION INTRAVENOUS at 07:43

## 2021-10-06 RX ADMIN — CEFAZOLIN SODIUM 2 G: 2 INJECTION, SOLUTION INTRAVENOUS at 17:00

## 2021-10-06 RX ADMIN — ACETAMINOPHEN 650 MG: 325 TABLET, FILM COATED ORAL at 23:28

## 2021-10-06 RX ADMIN — LATANOPROST 1 DROP: 50 SOLUTION OPHTHALMIC at 21:37

## 2021-10-06 RX ADMIN — Medication 30 MG: at 08:23

## 2021-10-06 RX ADMIN — LIDOCAINE HYDROCHLORIDE 60 MG: 20 INJECTION, SOLUTION INFILTRATION; PERINEURAL at 08:02

## 2021-10-06 RX ADMIN — PROPOFOL 25 MCG/KG/MIN: 10 INJECTION, EMULSION INTRAVENOUS at 08:02

## 2021-10-06 RX ADMIN — FAMOTIDINE 20 MG: 10 INJECTION INTRAVENOUS at 07:13

## 2021-10-06 RX ADMIN — DIGOXIN 125 MCG: 125 TABLET ORAL at 14:12

## 2021-10-06 NOTE — PERIOPERATIVE NURSING NOTE
Call placed to Dr. Collier at [id number] paged by charge RN-Cathy-need consent signed. Awaiting stat EKG 2/ hx Afib not reported by daughter-available care everywhere

## 2021-10-06 NOTE — OP NOTE
PREOPERATIVE DIAGNOSIS: Gross hematuria with clot retention    POSTOPERATIVE DIAGNOSIS: Same    PROCEDURE: Cystoscopy with clot evacuation and cauterization    SURGEON:  Mikal Collier Jr., M.D.    ASSISTANT SURGEON: None    ANESTHESIA: Spinal    HISTORY: The patient is a 89-year-old male with gross hematuria and clot retention who presents for cystoscopy.    DESCRIPTION OF PROCEDURE: Spinal anesthesia was obtained and he was placed in a lithotomy position.  He was prepped and draped in sterile fashion and received Rocephin preoperatively.  A 21 Canadian cystoscope was placed per urethra and into the bladder.  Clots were noted in the bladder and these were irrigated and removed.  Inspection of the bladder was carried out with both a 30 degree lens and a 70 degree lens.  Some evidence of BPH but there was no evidence of any tumors or stones in the bladder.  Cauterization of a bleeding point on the right side of the bladder neck area was performed with a Bugbee electrode.  A 22 Canadian three-way catheter was placed per urethra into the bladder and connected to overhead irrigation as well as bedside drainage. He was then taken to the recovery room.

## 2021-10-06 NOTE — PROGRESS NOTES
[facility]     Progress Note    Patient Name: Vinicio Samayoa  : 1924  MRN: [id number]  Primary Care Physician:  Adolfo Thacker MD  Date of admission: 10/3/2021    Subjective   Subjective     Chief Complaint: ckd III    Dysuria       Patient with ckd III with hematuria    Review of Systems   Genitourinary: Positive for dysuria.       Objective   Objective     Vitals:   Temp:  [97.1 °F (36.2 °C)-99.2 °F (37.3 °C)] 99.2 °F (37.3 °C)  Heart Rate:  [65-82] 65  Resp:  [16-19] 19  BP: (113-137)/(57-70) 113/60    Physical Exam   General Appearance:  In no acute distress.    HEENT: Normal HEENT exam.     Extremities: .  There is no deformity, effusion or dependent edema.    Neurological: Patient is alert     Result Review    Result Review:  I have personally reviewed the results from the time of this admission to 10/6/2021 07:49 EDT and agree with these findings:  []  Laboratory  []  Microbiology  []  Radiology  []  EKG/Telemetry   []  Cardiology/Vascular   []  Pathology  []  Old records  []  Other:      Assessment/Plan   Assessment / Plan     Brief Patient Summary:  Vinicio Samayoa is a 89 y.o. male who has ckd III and hematuria    Active Hospital Problems:  Active Hospital Problems    Diagnosis    • Gross hematuria      Plan:   Acute kidney injury versus CKD.  Creatinine improved with fluids  Gross hematuria  UTI  Worsening anemia, in part acute blood loss anemia.  May have some component of anemia of CKD  Gastroesophageal reflux disease  BPH  Chronic CHF, unknown if systolic or diastolic.    Patient seen and examined. S/p surgery this am. Undergoing bladder irrigation. No complaints. Labs reviewed. Renal function stable. Possible baseline. Continue current    Electronically signed by Mary Tripathi MD, 10/06/21, 7:49 AM EDT.

## 2021-10-06 NOTE — PROGRESS NOTES
"Daily progress note    Chief complaint  S/p cystoscopy with clot evacuation and cauterization  Doing better  Still bloody urine  No abdominal pain no nausea vomiting  No new complaints  Family at bedside    History of present illness  89-year-old white male who lives at home with his wife with history of BPH chronic anemia osteoarthritis and gastroesophageal reflux disease presented to [facility] emergency room with bloody urine started day before yesterday. Patient also have dysuria and increased frequency prior to that. Patient denies any fever chest pain Hartness of breath abdominal pain nausea vomiting diarrhea. Patient work-up in ER revealed calli hematuria and UTI admit for management     REVIEW OF SYSTEMS  Unremarkable except generalized weakness    PHYSICAL EXAM   Blood pressure 112/60, pulse 67, temperature 97 °F (36.1 °C), resp. rate 16, height 175.3 cm (69\"), weight 68.3 kg (150 lb 9.2 oz), SpO2 97 %.    Constitutional: Pt. is awake and alert and in no distress.    HENT: Normocephalic and atraumatic.   Neck: Normal range of motion. Neck supple. No JVD present.   Cardiovascular: Normal rate, regular rhythm and normal heart sounds. Exam reveals no gallop and no friction rub.   No murmur heard.  Pulmonary/Chest: Effort normal and breath sounds normal. No stridor. No respiratory distress. No wheezes, no rales.   Abdominal: Soft. Bowel sounds are normal. No distension. There is no tenderness. There is no rebound and no guarding.   Musculoskeletal: Age-appropriate range of motion. No edema, tenderness or deformity.   Neurological: Pt. is awake and alert.  Cranial nerves II through XII are intact.  He has no focal neurologic deficits  Skin: Skin is warm and dry. No rash noted.  And cauterized is not diaphoretic. No erythema.   Psychiatric: Mood, affect normal.  He is pleasant and cooperative.    LAB RESULTS  Lab Results (last 24 hours)     Procedure Component Value Units Date/Time    Urine Culture - " Urine, Urine, Random Void [[id number]]  (Abnormal)  (Susceptibility) Collected: 10/03/21 2002    Specimen: Urine, Random Void Updated: 10/06/21 1035     Urine Culture >100,000 CFU/mL Staphylococcus aureus     Comment: Staphylococcus aureus is not typically regarded as a uropathogen, except in cases with genitourinary instrumentation present.  It's presence suggests an alternate source (e.g. bloodstream, abscess, SSTI, etc.).  Please evaluate accordingly.       Susceptibility      Staphylococcus aureus      MARIA ISABEL      Nitrofurantoin Susceptible      Oxacillin Susceptible      Tetracycline Susceptible      Trimethoprim + Sulfamethoxazole Susceptible      Vancomycin Susceptible               Linear View               Susceptibility Comments     Staphylococcus aureus    This isolate does not demonstrate inducible clindamycin resistance in vitro.               CBC & Differential [[id number]]  (Abnormal) Collected: 10/06/21 0659    Specimen: Blood Updated: 10/06/21 0753    Narrative:      The following orders were created for panel order CBC & Differential.  Procedure                               Abnormality         Status                     ---------                               -----------         ------                     CBC Auto Differential[[id number]]        Abnormal            Final result                 Please view results for these tests on the individual orders.    CBC Auto Differential [[id number]]  (Abnormal) Collected: 10/06/21 0659    Specimen: Blood Updated: 10/06/21 0753     WBC 7.25 10*3/mm3      RBC 2.76 10*6/mm3      Hemoglobin 8.7 g/dL      Hematocrit 25.8 %      MCV 93.5 fL      MCH 31.5 pg      MCHC 33.7 g/dL      RDW 14.5 %      RDW-SD 49.3 fl      MPV 9.8 fL      Platelets 140 10*3/mm3      Neutrophil % 69.0 %      Lymphocyte % 18.3 %      Monocyte % 10.5 %      Eosinophil % 1.2 %      Basophil % 0.4 %      Immature Grans % 0.6 %      Neutrophils, Absolute 5.00 10*3/mm3      Lymphocytes, Absolute  1.33 10*3/mm3      Monocytes, Absolute 0.76 10*3/mm3      Eosinophils, Absolute 0.09 10*3/mm3      Basophils, Absolute 0.03 10*3/mm3      Immature Grans, Absolute 0.04 10*3/mm3      nRBC 0.0 /100 WBC     Basic Metabolic Panel [[id number]]  (Abnormal) Collected: 10/06/21 0659    Specimen: Blood Updated: 10/06/21 0726     Glucose 101 mg/dL      BUN 26 mg/dL      Creatinine 1.27 mg/dL      Sodium 138 mmol/L      Potassium 4.2 mmol/L      Chloride 106 mmol/L      CO2 24.5 mmol/L      Calcium 8.8 mg/dL      eGFR Non African Amer 53 mL/min/1.73      BUN/Creatinine Ratio 20.5     Anion Gap 7.5 mmol/L     Narrative:      GFR Normal >60  Chronic Kidney Disease <60  Kidney Failure <15          Imaging Results (Last 24 Hours)     ** No results found for the last 24 hours. **          Current Facility-Administered Medications:   •  cefTRIAXone (ROCEPHIN) 1 g in sodium chloride 0.9 % 100 mL IVPB-VTB, 1 g, Intravenous, Q24H, Mikal Collier Jr., MD, Stopped at 10/05/21 1742  •  digoxin (LANOXIN) tablet 125 mcg, 125 mcg, Oral, Daily With Lunch, Mikal Collier Jr., MD, 125 mcg at 10/05/21 1953  •  famotidine (PEPCID) tablet 20 mg, 20 mg, Oral, BID, Mikal Collier Jr., MD  •  ferrous sulfate tablet 325 mg, 325 mg, Oral, Daily With Breakfast, Mikal Collier Jr., MD, 325 mg at 10/05/21 1951  •  latanoprost (XALATAN) 0.005 % ophthalmic solution 1 drop, 1 drop, Both Eyes, Nightly, Mikal Collier Jr., MD, 1 drop at 10/05/21 1952  •  tamsulosin (FLOMAX) 24 hr capsule 0.4 mg, 0.4 mg, Oral, Nightly, Mikal Collier Jr., MD, 0.4 mg at 10/05/21 1952    ASSESSMENT  Acute MSSA UTI  Hematuria s/p cystoscopy with clot evacuation and cauterization  BPH  Chronic anemia   Acute kidney injury  Chronic kidney disease stage III  Gastroesophageal flux disease    PLAN  CPM  Postop care  IVF  Change IV antibiotics to doxycycline  Infectious disease consult  Continue CBI  Urology consult appreciated  Nephrology to follow  patient  Continue home medications  Stress ulcer DVT prophylaxis  Supportive care  DNR  PT/OT  Discussed with family and nursing staff  Follow closely further recommendation current hospital course    EVETTE PEREZ MD

## 2021-10-06 NOTE — PERIOPERATIVE NURSING NOTE
Per Cathy-call placed 2nd attempt to dr. Collier for consent to be signed. EKG reviewed by Dr. Gentile-no new orders

## 2021-10-06 NOTE — PLAN OF CARE
Goal Outcome Evaluation:   Patient had cystoscopy with clot evacuation today.  CBI with NS pinkish to red at times.  Blood cultures drawn.  Ancef given.  ID, PT & OT consulted.  VSS.  Will continue to monitor.

## 2021-10-06 NOTE — ANESTHESIA PREPROCEDURE EVALUATION
Anesthesia Evaluation     Patient summary reviewed                Airway   Mallampati: II  Neck ROM: limited  No difficulty expected  Dental      Pulmonary    Cardiovascular     Rhythm: regular        Neuro/Psych  GI/Hepatic/Renal/Endo      Musculoskeletal     Abdominal    Substance History      OB/GYN          Other                        Anesthesia Plan    ASA 3     spinal       Anesthetic plan, all risks, benefits, and alternatives have been provided, discussed and informed consent has been obtained with: patient and spouse/significant other.

## 2021-10-06 NOTE — ANESTHESIA POSTPROCEDURE EVALUATION
"Patient: Vinicio Samayoa    Procedure Summary     Date: 10/06/21 Room / Location: [facility] OR 01 / [facility] MAIN OR    Anesthesia Start: 0743 Anesthesia Stop: 0843    Procedure: CYSTOSCOPY WITH CLOT EVACUATION, FULGURATION OF BLEEDING, & CATHETER PLACEMENT (N/A Bladder) Diagnosis:     Surgeons: Mikal Collier Jr., MD Provider: Silvestre Gentile MD    Anesthesia Type: spinal ASA Status: 3          Anesthesia Type: spinal    Vitals  Vitals Value Taken Time   /62 10/06/21 0916   Temp 36.9 °C (98.4 °F) 10/06/21 0839   Pulse 77 10/06/21 0921   Resp 16 10/06/21 0900   SpO2 95 % 10/06/21 0921   Vitals shown include unvalidated device data.        Post Anesthesia Care and Evaluation    Patient location during evaluation: PACU  Patient participation: complete - patient participated  Level of consciousness: awake and alert  Pain management: adequate  Airway patency: patent  Anesthetic complications: No anesthetic complications    Cardiovascular status: acceptable  Respiratory status: acceptable  Hydration status: acceptable    Comments: /72 (BP Location: Left arm, Patient Position: Lying)   Pulse 89   Temp 36.9 °C (98.4 °F) (Oral)   Resp 16   Ht 175.3 cm (69\")   Wt 68.3 kg (150 lb 9.2 oz)   SpO2 92%   BMI 22.24 kg/m²       "

## 2021-10-06 NOTE — PLAN OF CARE
Goal Outcome Evaluation:  Plan of Care Reviewed With: patient, spouse, family        Progress: no change  Outcome Summary: CBI continues on full speed, irrigated x1 thus far, pt to have cystoscopy this am, daughter wants to speak to MD prior to signing consent, 1 unit PRBC given, NPO since MN

## 2021-10-06 NOTE — ANESTHESIA PROCEDURE NOTES
Spinal Block      Patient location during procedure: OR  Indication:post-op pain management and procedure for pain  Performed By  Anesthesiologist: Silvestre Gentile MD  CRNA: Nel Moreno CRNA  Preanesthetic Checklist  Completed: patient identified, IV checked, site marked, risks and benefits discussed, surgical consent, monitors and equipment checked, pre-op evaluation and timeout performed  Spinal Block Prep:  Patient Position:sitting  Sterile Tech:gloves and mask  Prep:Chloraprep  Patient Monitoring:blood pressure monitoring, continuous pulse oximetry and EKG  Spinal Block Procedure  Approach:midline  Guidance:landmark technique and palpation technique  Location:L4-L5  Needle Type:Sudarshan  Needle Gauge:24 G  Placement of Spinal needle event:cerebrospinal fluid aspirated  Paresthesia: no  Fluid Appearance:clear  Medications: bupivacaine PF (MARCAINE) 0.75 % injection, 1.6 mL  Med Administered at 10/6/2021 7:59 AM   Post Assessment  Patient Tolerance:patient tolerated the procedure well with no apparent complications  Complications no

## 2021-10-06 NOTE — CONSULTS
CONSULT NOTE    Infectious Diseases - Claudia Benjamin MD  [facility]       Patient Identification:  Name: Vinicio Samayoa  Age: 89 y.o.  Sex: male  :  1924  MRN: [id number]             Date of Consultation: 10/6/2021      Primary Care Physician: Adolfo Thacker MD                               Requesting Physician: Dr. Murcia  Reason for Consultation: Staph aureus in the urine    Impression: Source of information patient's daughter who is a nurse as well as patient's wife and review of records and discussion with Dr. Queen.  Patient himself is unable to give much details about his symptoms.  He does state that his lower back hurts.  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  Patient was brought to the emergency room and was found to have abnormal urinalysis consistent with UTI and was placed on continuous bladder irrigation after being seen by urology service.  Urine culture was performed which shows evidence of methicillin sensitive staph aureus for which patient was appropriately treated with IV Rocephin.  Because of the presence of staph aureus infectious disease service is consulted.  I did detail information from patient's daughter who is a nurse going all the way back to last year and she will information about cellulitis of the upper extremity due to fall and injury with resolution along with brief.  Of intermittent catheterization when he was being diuresed for congestive heart failure exacerbation  at the end of last year noted.  There is no recent  intervention.  Initially no specific localizing symptoms was conveyed to me but during examination patient admits that his lower back is hurting and did have tenderness in the left sacroiliac joint area.  A-This presentation of staph aureus in the urine culture in the setting is concerning for:  1-possible systemic staph aureus sepsis with transient bacteremia and then subsequent passive filtration in the  tract and has positive culture especially the fact that he is complaining of low back pain and left SI joint tenderness versus -with risk of other areas of seeding including possibility of endovascular infection -focal dilatation/bulge of the posterior margin of the abdominal aorta noted on the CT scan of the abdomen and pelvis on 10/4/2021, discitis osteomyelitis or sacroiliitis.  2-ascending  infection from prior intermittent catheterization  B- failed elderly male with multiple comorbidities  C-hematuria with possibilities include primary  malignancy -nodular mass seen in the urinary bladder on CT scan of the abdomen pelvis performed on 10/4/2021, prostatic bleeding or UTI  D- history of benign prostatic hyperplasia  E- other diagnosis per primary team.        Recommendations/Discussions:  This is a very difficult situation.  He has significant comorbidities along with fraility due to his age that aggressive interventions in terms of diagnosis and treatment of this entity can have a deleterious affect on his very delicate and tenuous physiology.  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  Given the imaging studies possibility of endovascular infection involving the abdominal aorta is also concerned.  Moreover underlying  malignancy is also a concern.  I had a long conversation with patient's daughter  who is a nurse as well as patient's wife who is also a nurse but this concept of care.  I recommend adjusting his antibiotics to IV cefazolin from initially appropriately selected ceftriaxone and decide upon duration of antibiotic treatment and whether or not aggressive work-up is needed to identify other areas of infection due to disseminated staph aureus as result of bacteremia.   Thank you Dr. Queen for letting me be the part of your patient care please see above impression and recommendations.        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  Patient was brought to the emergency room and was found to have abnormal urinalysis consistent with UTI and was placed on continuous bladder irrigation after being seen by urology service.  Urine culture was performed which shows evidence of methicillin sensitive staph aureus for which patient was appropriately treated with IV Rocephin.  Because of the presence of staph aureus infectious disease service is consulted.  I did detail information from patient's daughter who is a nurse going all the way back to last year and she will information about cellulitis of the upper extremity due to fall and injury with resolution along with brief.  Of intermittent catheterization when he was being diuresed for congestive heart failure exacerbation at the end of last year noted.  There is no recent  intervention.  Initially no specific localizing symptoms was conveyed to me  but during examination patient admits that his lower back is hurting and did have tenderness in the left sacroiliac joint area.      Past Medical History:  Past Medical History:   Diagnosis Date   • Cancer (HCC)     lt ear    • CHF (congestive heart failure) (HCC)    • Deaf, left     Partial hearing Rt ear    • Fractures     collar bone, vertebrae   • Kidney stones     40 yrs ago      Past Surgical History:  Past Surgical History:   Procedure Laterality Date   • CATARACT EXTRACTION Bilateral    • CORONARY ARTERY BYPASS GRAFT      4 vessel-30 yrs ago    • TONSILLECTOMY        Home Meds:  Medications Prior to Admission   Medication Sig Dispense Refill Last Dose   • DANDELION PO Take  by mouth 3 (Three) Times a Day.   10/3/2021 at 1200   • digoxin (LANOXIN) 125 MCG tablet Take 125 mcg by mouth Daily With Lunch.   10/2/2021 at 1200   • furosemide (LASIX) 20 MG tablet Take 20 mg by mouth Daily As Needed.      • Ginkgo Biloba (GINKOBA PO) Take 2 capsules by mouth Daily.   10/3/2021 at 0800   • HAWTHORN PO Take  by mouth 3 (Three) Times a Day.   10/3/2021 at 1200   • latanoprost (XALATAN) 0.005 % ophthalmic solution 1 drop Every Night.   10/2/2021 at 2100   • Nutritional Supplements (GRAPESEED EXTRACT PO) Take  by mouth Daily.   10/3/2021 at 0800   • tamsulosin (FLOMAX) 0.4 MG capsule 24 hr capsule Take 1 capsule by mouth Every Night.   10/2/2021 at 2100   • ferrous sulfate 325 (65 FE) MG tablet Take 325 mg by mouth Daily With Breakfast.   Unknown at Unknown time     Current Meds:     Current Facility-Administered Medications:   •  digoxin (LANOXIN) tablet 125 mcg, 125 mcg, Oral, Daily With Lunch, Mikal Collier Jr., MD, 125 mcg at 10/05/21 1953  •  doxycycline (VIBRAMYCIN) 100 mg in sodium chloride 0.9 % 100 mL IVPB-VTB, 100 mg, Intravenous, Q12H, Alen Murcia MD  •  famotidine (PEPCID) tablet 20 mg, 20 mg, Oral, BID, Mikal Collier Jr., MD  •  ferrous sulfate tablet 325 mg, 325 mg, Oral, Daily With Breakfast,  "Mikal Collier Jr., MD, 325 mg at 10/05/21 1951  •  latanoprost (XALATAN) 0.005 % ophthalmic solution 1 drop, 1 drop, Both Eyes, Nightly, Mikal Collier Jr., MD, 1 drop at 10/05/21 1952  •  tamsulosin (FLOMAX) 24 hr capsule 0.4 mg, 0.4 mg, Oral, Nightly, Mikal Collier Jr., MD, 0.4 mg at 10/05/21 1952  Allergies:  Allergies   Allergen Reactions   • Sulfa Antibiotics Hives     Social History:   Social History     Tobacco Use   • Smoking status: Former Smoker   • Smokeless tobacco: Never Used   Substance Use Topics   • Alcohol use: Not Currently      Family History:  History reviewed. No pertinent family history.       Review of Systems  See history of present illness and past medical history.  Complaining of pain in the lower back  Remainder of ROS is negative.  At the time of admission his review of system was recorded as follows:  Constitutional: Negative for chills and fever.   Respiratory: Negative for chest tightness.    Cardiovascular: Negative for chest pain.   Gastrointestinal: Negative for abdominal pain, diarrhea, nausea and vomiting.   Genitourinary: Positive for dysuria, frequency and hematuria.   Vitals:   /60   Pulse 67   Temp 97 °F (36.1 °C)   Resp 16   Ht 175.3 cm (69\")   Wt 68.3 kg (150 lb 9.2 oz)   SpO2 97%   BMI 22.24 kg/m²   I/O:     Intake/Output Summary (Last 24 hours) at 10/6/2021 1256  Last data filed at 10/6/2021 1001  Gross per 24 hour   Intake 660 ml   Output 05013 ml   Net -84816 ml     Exam:  Patient is examined using the personal protective equipment as per guidelines from infection control for this particular patient as enacted.  Hand washing was performed before and after patient interaction.  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   Head:    Normocephalic, without obvious abnormality, atraumatic   Eyes:    PERRL, " conjunctivae/corneas clear, EOM's intact, both eyes   Ears:    Normal external ear canals, both ears   Nose:   Nares normal, septum midline, mucosa normal, no drainage    or sinus tenderness   Throat:   Lips, tongue, gums normal; oral mucosa pink and moist   Neck:   Supple, symmetrical, trachea midline, no adenopathy;     thyroid:  no enlargement/tenderness/nodules; no carotid    bruit or JVD   Back:    Left sacroiliac joint tenderness but no midline tenderness noted   Lungs:     Clear to auscultation bilaterally, respirations unlabored   Chest Wall:    No tenderness or deformity    Heart:    Regular rate and rhythm, S1 and S2    Abdomen:    Soft nontender three-way catheter in place.  He wanted me to check his scrotum which did not show any acute abnormalities.   Extremities:   Extremities normal, atraumatic, no cyanosis or edema   Pulses:   Pulses palpable in all extremities; symmetric all extremities   Skin:   Skin color normal, Skin is warm and dry,  no rashes or palpable lesions   Neurologic:  Legally blind but able to move lower extremities.       Data Review:    I reviewed the patient's new clinical results.  Results from last 7 days   Lab Units 10/06/21  0659 10/05/21  0949 10/04/21  0757 10/03/21  1723   WBC 10*3/mm3 7.25 6.15 6.02 6.50   HEMOGLOBIN g/dL 8.7* 7.9* 8.3* 9.4*   PLATELETS 10*3/mm3 140 150 148 149     Results from last 7 days   Lab Units 10/06/21  0659 10/05/21  0949 10/04/21  0757 10/03/21  1723   SODIUM mmol/L 138 140 139 139   POTASSIUM mmol/L 4.2 4.5 4.7 5.1   CHLORIDE mmol/L 106 108* 105 104   CO2 mmol/L 24.5 22.8 24.8 26.8   BUN mg/dL 26* 27* 30* 34*   CREATININE mg/dL 1.27 1.28* 1.46* 1.63*   CALCIUM mg/dL 8.8 8.8 9.2 9.5   GLUCOSE mg/dL 101* 83 78 113*     Microbiology Results (last 10 days)     Procedure Component Value - Date/Time    Urine Culture - Urine, Urine, Random Void [[id number]]  (Abnormal)  (Susceptibility) Collected: 10/03/21 2002    Lab Status: Final result Specimen: Urine,  Random Void Updated: 10/06/21 1035     Urine Culture >100,000 CFU/mL Staphylococcus aureus     Comment: Staphylococcus aureus is not typically regarded as a uropathogen, except in cases with genitourinary instrumentation present.  It's presence suggests an alternate source (e.g. bloodstream, abscess, SSTI, etc.).  Please evaluate accordingly.       Susceptibility      Staphylococcus aureus      MARIA ISABEL      Nitrofurantoin Susceptible      Oxacillin Susceptible      Tetracycline Susceptible      Trimethoprim + Sulfamethoxazole Susceptible      Vancomycin Susceptible               Linear View               Susceptibility Comments     Staphylococcus aureus    This isolate does not demonstrate inducible clindamycin resistance in vitro.               COVID PRE-OP / PRE-PROCEDURE SCREENING ORDER (NO ISOLATION) - Swab, Nasopharynx [[id number]]  (Normal) Collected: 10/03/21 1839    Lab Status: Final result Specimen: Swab from Nasopharynx Updated: 10/04/21 0048    Narrative:      The following orders were created for panel order COVID PRE-OP / PRE-PROCEDURE SCREENING ORDER (NO ISOLATION) - Swab, Nasopharynx.  Procedure                               Abnormality         Status                     ---------                               -----------         ------                     COVID-19,APTIMA PANTHER(...[[id number]]  Normal              Final result                 Please view results for these tests on the individual orders.    COVID-19,APTIMA PANTHER(PRAFUL),[facility], NP/OP SWAB IN UTM/VTM/SALINE TRANSPORT MEDIA,24 HR TAT - Swab, Nasopharynx [[id number]]  (Normal) Collected: 10/03/21 1839    Lab Status: Final result Specimen: Swab from Nasopharynx Updated: 10/04/21 0048     COVID19 Not Detected    Narrative:      Fact sheet for providers: https://www.fda.gov/media/708368/download     Fact sheet for patients: https://www.fda.gov/media/102388/download    Test performed by RT PCR.            Assessment:  Active Hospital Problems     Diagnosis  POA   • Gross hematuria [R31.0]  Yes      Resolved Hospital Problems   No resolved problems to display.         Plan:  See above  Claudia Serna MD   10/6/2021  12:56 EDT    Much of this encounter note is an electronic transcription/translation of spoken language to printed text. The electronic translation of spoken language may permit erroneous, or at times, nonsensical words or phrases to be inadvertently transcribed; Although I have reviewed the note for such errors, some may still exist

## 2021-10-06 NOTE — SIGNIFICANT NOTE
Daughter Spoke to dr. Collier/dr. biggs at  and requested spinal anesthesia only without opiod or sedation

## 2021-10-07 LAB
ANION GAP SERPL CALCULATED.3IONS-SCNC: 7.9 MMOL/L (ref 5–15)
BASOPHILS # BLD AUTO: 0.03 10*3/MM3 (ref 0–0.2)
BASOPHILS NFR BLD AUTO: 0.4 % (ref 0–1.5)
BUN SERPL-MCNC: 29 MG/DL (ref 8–23)
BUN/CREAT SERPL: 17.4 (ref 7–25)
CALCIUM SPEC-SCNC: 8.8 MG/DL (ref 8.2–9.6)
CHLORIDE SERPL-SCNC: 105 MMOL/L (ref 98–107)
CO2 SERPL-SCNC: 24.1 MMOL/L (ref 22–29)
CREAT SERPL-MCNC: 1.67 MG/DL (ref 0.76–1.27)
DEPRECATED RDW RBC AUTO: 48.3 FL (ref 37–54)
EOSINOPHIL # BLD AUTO: 0.24 10*3/MM3 (ref 0–0.4)
EOSINOPHIL NFR BLD AUTO: 3.5 % (ref 0.3–6.2)
ERYTHROCYTE [DISTWIDTH] IN BLOOD BY AUTOMATED COUNT: 14.3 % (ref 12.3–15.4)
GFR SERPL CREATININE-BSD FRML MDRD: 38 ML/MIN/1.73
GLUCOSE SERPL-MCNC: 124 MG/DL (ref 65–99)
HCT VFR BLD AUTO: 23.6 % (ref 37.5–51)
HGB BLD-MCNC: 7.7 G/DL (ref 13–17.7)
IMM GRANULOCYTES # BLD AUTO: 0.05 10*3/MM3 (ref 0–0.05)
IMM GRANULOCYTES NFR BLD AUTO: 0.7 % (ref 0–0.5)
LYMPHOCYTES # BLD AUTO: 1.05 10*3/MM3 (ref 0.7–3.1)
LYMPHOCYTES NFR BLD AUTO: 15.4 % (ref 19.6–45.3)
MCH RBC QN AUTO: 30.8 PG (ref 26.6–33)
MCHC RBC AUTO-ENTMCNC: 32.6 G/DL (ref 31.5–35.7)
MCV RBC AUTO: 94.4 FL (ref 79–97)
MONOCYTES # BLD AUTO: 0.81 10*3/MM3 (ref 0.1–0.9)
MONOCYTES NFR BLD AUTO: 11.8 % (ref 5–12)
NEUTROPHILS NFR BLD AUTO: 4.66 10*3/MM3 (ref 1.7–7)
NEUTROPHILS NFR BLD AUTO: 68.2 % (ref 42.7–76)
NRBC BLD AUTO-RTO: 0 /100 WBC (ref 0–0.2)
PLATELET # BLD AUTO: 145 10*3/MM3 (ref 140–450)
PMV BLD AUTO: 9.8 FL (ref 6–12)
POTASSIUM SERPL-SCNC: 3.9 MMOL/L (ref 3.5–5.2)
RBC # BLD AUTO: 2.5 10*6/MM3 (ref 4.14–5.8)
SODIUM SERPL-SCNC: 137 MMOL/L (ref 136–145)
WBC # BLD AUTO: 6.84 10*3/MM3 (ref 3.4–10.8)

## 2021-10-07 PROCEDURE — 97110 THERAPEUTIC EXERCISES: CPT | Performed by: OCCUPATIONAL THERAPIST

## 2021-10-07 PROCEDURE — 36430 TRANSFUSION BLD/BLD COMPNT: CPT

## 2021-10-07 PROCEDURE — 80048 BASIC METABOLIC PNL TOTAL CA: CPT | Performed by: HOSPITALIST

## 2021-10-07 PROCEDURE — 97162 PT EVAL MOD COMPLEX 30 MIN: CPT

## 2021-10-07 PROCEDURE — 25010000003 CEFAZOLIN IN DEXTROSE 2-4 GM/100ML-% SOLUTION: Performed by: INTERNAL MEDICINE

## 2021-10-07 PROCEDURE — 97165 OT EVAL LOW COMPLEX 30 MIN: CPT | Performed by: OCCUPATIONAL THERAPIST

## 2021-10-07 PROCEDURE — 86900 BLOOD TYPING SEROLOGIC ABO: CPT

## 2021-10-07 PROCEDURE — 97530 THERAPEUTIC ACTIVITIES: CPT

## 2021-10-07 PROCEDURE — 94799 UNLISTED PULMONARY SVC/PX: CPT

## 2021-10-07 PROCEDURE — 85025 COMPLETE CBC W/AUTO DIFF WBC: CPT | Performed by: HOSPITALIST

## 2021-10-07 PROCEDURE — P9016 RBC LEUKOCYTES REDUCED: HCPCS

## 2021-10-07 RX ORDER — CEFAZOLIN SODIUM 2 G/100ML
2 INJECTION, SOLUTION INTRAVENOUS EVERY 12 HOURS
Status: DISCONTINUED | OUTPATIENT
Start: 2021-10-07 | End: 2021-10-09

## 2021-10-07 RX ADMIN — CEFAZOLIN SODIUM 2 G: 2 INJECTION, SOLUTION INTRAVENOUS at 07:44

## 2021-10-07 RX ADMIN — TAMSULOSIN HYDROCHLORIDE 0.4 MG: 0.4 CAPSULE ORAL at 21:40

## 2021-10-07 RX ADMIN — CEFAZOLIN SODIUM 2 G: 2 INJECTION, SOLUTION INTRAVENOUS at 21:39

## 2021-10-07 RX ADMIN — FERROUS SULFATE TAB 325 MG (65 MG ELEMENTAL FE) 325 MG: 325 (65 FE) TAB at 07:45

## 2021-10-07 RX ADMIN — DIGOXIN 125 MCG: 125 TABLET ORAL at 12:17

## 2021-10-07 RX ADMIN — LATANOPROST 1 DROP: 50 SOLUTION OPHTHALMIC at 21:00

## 2021-10-07 NOTE — THERAPY EVALUATION
Patient Name: Vinicio Samayoa  : 1924    MRN: [id number]                              Today's Date: 10/7/2021       Admit Date: 10/3/2021    Visit Dx:     ICD-10-CM ICD-9-CM   1. Gross hematuria  R31.0 599.71     Patient Active Problem List   Diagnosis   • Gross hematuria     Past Medical History:   Diagnosis Date   • Cancer (HCC)     lt ear    • CHF (congestive heart failure) (HCC)    • Deaf, left     Partial hearing Rt ear    • Fractures     collar bone, vertebrae   • Kidney stones     40 yrs ago      Past Surgical History:   Procedure Laterality Date   • CATARACT EXTRACTION Bilateral    • CORONARY ARTERY BYPASS GRAFT      4 vessel-30 yrs ago    • CYSTOSCOPY WITH CLOT EVACUATION N/A 10/6/2021    Procedure: CYSTOSCOPY WITH CLOT EVACUATION, FULGURATION OF BLEEDING, & CATHETER PLACEMENT;  Surgeon: Mikal Collier Jr., MD;  Location: [facility];  Service: Urology;  Laterality: N/A;   • TONSILLECTOMY       General Information     Row Name 10/07/21 1418          OT Time and Intention    Document Type  evaluation;therapy note (daily note)  -     Mode of Treatment  individual therapy;occupational therapy  -     Row Name 10/07/21 1418          General Information    Patient Profile Reviewed  yes  -     Prior Level of Function  independent:;min assist:;ADL's;transfer;gait;bed mobility pt fam A w shower and LBD as pt does not want to fall and worries  -     Existing Precautions/Restrictions  fall  -     Barriers to Rehab  none identified  -     Row Name 10/07/21 1418          Living Environment    Lives With  child(carol), adult  -     Row Name 10/07/21 1418          Cognition    Orientation Status (Cognition)  oriented x 3  -     Row Name 10/07/21 1418          Safety Issues, Functional Mobility    Impairments Affecting Function (Mobility)  balance;strength;endurance/activity tolerance  -       User Key  (r) = Recorded By, (t) = Taken By, (c) = Cosigned By    Initials Name Provider Type      Shawanda Mitchell, SANDIP Occupational Therapist          Mobility/ADL's     Row Name 10/07/21 1418          Bed Mobility    Bed Mobility  scooting/bridging;sit-supine  -     Scooting/Bridging [place] (Bed Mobility)  set up;moderate assist (50% patient effort);2 person assist  -     Sit-Supine [place] (Bed Mobility)  set up;minimum assist (75% patient effort);moderate assist (50% patient effort)  -     Row Name 10/07/21 1418          Transfers    Transfers  sit-stand transfer;toilet transfer  -     Sit-Stand [place] (Transfers)  set up;minimum assist (75% patient effort)  -     [place] Level (Toilet Transfer)  set up;minimum assist (75% patient effort)  -     Row Name 10/07/21 1418          Toilet Transfer    Type (Toilet Transfer)  stand-sit;sit-stand  -[facility] Name 10/07/21 1418          Functional Mobility    Functional Mobility- Ind. Level  minimum assist (75% patient effort)  -     Functional Mobility- Comment  in room  -     Row Name 10/07/21 1418          Activities of Daily Living    BADL Assessment/Intervention  toileting  -     Row Name 10/07/21 1418          Toileting Assessment/Training    [place] Level (Toileting)  dependent (less than 25% patient effort);toileting skills  -     Position (Toileting)  supported sitting;supported standing  -       User Key  (r) = Recorded By, (t) = Taken By, (c) = Cosigned By    Initials Name Provider Type     Shawanda Mitchell OTR Occupational Therapist        Obj/Interventions     Row Name 10/07/21 1419          Vision Assessment/Intervention    Visual Impairment/Limitations  WFL  -     Row Name 10/07/21 1419          Range of Motion Comprehensive    General Range of Motion  upper extremity range of motion deficits identified  -     Comment, General Range of Motion  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 Pt has good elbow fl/ext. and half forearm supination  -     Row Name 10/07/21 1419           Strength Comprehensive (MMT)    General Manual Muscle Testing (MMT) Assessment  upper extremity strength deficits identified  -     Comment, General Manual Muscle Testing (MMT) Assessment  but WFL for his age  -     Row Name 10/07/21 1419          Shoulder (Therapeutic Exercise)    Shoulder (Therapeutic Exercise)  AROM (active range of motion)  -     Shoulder AROM (Therapeutic Exercise)  right;left;flexion;extension;10 repetitions;scapular retraction;scapular protraction;sitting;2 sets  -     Row Name 10/07/21 1419          Elbow/Forearm (Therapeutic Exercise)    Elbow/Forearm (Therapeutic Exercise)  AROM (active range of motion)  -     Elbow/Forearm AROM (Therapeutic Exercise)  flexion;extension;bilateral;supination;pronation;10 repetitions;2 sets;sitting  -     Row Name 10/07/21 1419          Motor Skills    Motor Skills  coordination;functional endurance  -     Coordination  WFL  -     Functional Endurance  fair +  -     Row Name 10/07/21 1419          Balance    Balance Assessment  sitting static balance;standing static balance  -     Static Sitting Balance  WFL  -     Static Standing Balance  mild impairment  -     Balance Interventions  sitting;standing;sit to stand;supported;static  -     Row Name 10/07/21 1419          Therapeutic Exercise    Therapeutic Exercise  elbow/forearm;shoulder  -       User Key  (r) = Recorded By, (t) = Taken By, (c) = Cosigned By    Initials Name Provider Type     Shawanda Mitchell, SANDIP Occupational Therapist        Goals/Plan     Row Name 10/07/21 1425          Transfer Goal 1 (OT)    Activity/Assistive Device (Transfer Goal 1, OT)  sit-to-stand/stand-to-sit;toilet  -     [place] Level/Cues Needed (Transfer Goal 1, OT)  set-up required;contact guard assist  -     Time Frame (Transfer Goal 1, OT)  short term goal (STG);2 weeks  -     Progress/Outcome (Transfer Goal 1, OT)  goal ongoing  -     Row Name 10/07/21 2542           Bathing Goal 1 (OT)    Activity/Device (Bathing Goal 1, OT)  bathing skills, all  -KP     [place] Level/Cues Needed (Bathing Goal 1, OT)  set-up required;minimum assist (75% or more patient effort)  -KP     Time Frame (Bathing Goal 1, OT)  short term goal (STG);2 weeks  -KP     Progress/Outcomes (Bathing Goal 1, OT)  goal ongoing  -     Row Name 10/07/21 1424          Problem Specific Goal 1 (OT)    Problem Specific Goal 1 (OT)  incr endurance to good  -KP     Time Frame (Problem Specific Goal 1, OT)  short term goal (STG);2 weeks  -KP     Progress/Outcome (Problem Specific Goal 1, OT)  goal ongoing  -     Row Name 10/07/21 1424          Therapy Assessment/Plan (OT)    Planned Therapy Interventions (OT)  activity tolerance training;functional balance retraining;occupation/activity based interventions;BADL retraining;transfer/mobility retraining;strengthening exercise;ROM/therapeutic exercise  -       User Key  (r) = Recorded By, (t) = Taken By, (c) = Cosigned By    Initials Name Provider Type    Shawanda Villareal, OTR Occupational Therapist        Clinical Impression     Row Name 10/07/21 1422          Pain Assessment    Additional Documentation  Pain Scale: Numbers Pre/Post-Treatment (Group)  -     Row Name 10/07/21 1422          Pain Scale: Numbers Pre/Post-Treatment    Pretreatment Pain Rating  0/10 - no pain  -     Posttreatment Pain Rating  0/10 - no pain  -     Row Name 10/07/21 1422          Plan of Care Review    Plan of Care Reviewed With  patient  -     Progress  improving  -     Outcome Summary  pt evaluated for OT. pt admitted from home where he has assist w his shower at home so he doesn't fall and uses a shower chair. He was admitted w. gross hematuria.He is currently min A w tsf and walking, completed AROM EOB and dep w toileting skills. He has decr ROM in shoulders and R shoulder h/o frozen sh. pt has decr endurance and has rec therapy in the past. He understands his UE  ex with therapist. cont OT to inc endurance , tsf, balance, and ADLs.  -     Row Name 10/07/21 1422          Therapy Assessment/Plan (OT)    Rehab Potential (OT)  good, to achieve stated therapy goals  -     Criteria for Skilled Therapeutic Interventions Met (OT)  yes;meets criteria;skilled treatment is necessary  -     Therapy Frequency (OT)  3 times/wk  -     Row Name 10/07/21 1422          Therapy Plan Review/Discharge Plan (OT)    Anticipated Discharge Disposition (OT)  home with assist  -     Row Name 10/07/21 1422          Positioning and Restraints    Pre-Treatment Position  bathroom  -KP     Post Treatment Position  bed  -KP     In Bed  supine;call light within reach;encouraged to call for assist;exit alarm on;with family/caregiver  -       User Key  (r) = Recorded By, (t) = Taken By, (c) = Cosigned By    Initials Name Provider Type    Shawanda Villareal, OTR Occupational Therapist        Outcome Measures     Row Name 10/07/21 1425          How much help from another is currently needed...    Putting on and taking off regular lower body clothing?  1  -KP     Bathing (including washing, rinsing, and drying)  2  -KP     Toileting (which includes using toilet bed pan or urinal)  1  -KP     Putting on and taking off regular upper body clothing  3  -KP     Taking care of personal grooming (such as brushing teeth)  3  -KP     Eating meals  3  -KP     AM-PAC 6 Clicks Score (OT)  13  -     Row Name 10/07/21 1426          How much help from another person do you currently need...    Turning from your back to your side while in flat bed without using bedrails?  3  -CB     Moving from lying on back to sitting on the side of a flat bed without bedrails?  3  -CB     Moving to and from a bed to a chair (including a wheelchair)?  3  -CB     Standing up from a chair using your arms (e.g., wheelchair, bedside chair)?  3  -CB     Climbing 3-5 steps with a railing?  2  -CB     To walk in hospital room?  3   -CB     AM-PAC 6 Clicks Score (PT)  17  -CB     Row Name 10/07/21 1426 10/07/21 1425       Functional Assessment    Outcome Measure Options  AM-PAC 6 Clicks Basic Mobility (PT)  -  AM-PAC 6 Clicks Daily Activity (OT)  -      User Key  (r) = Recorded By, (t) = Taken By, (c) = Cosigned By    Initials Name Provider Type    Shawanda Villareal OTR Occupational Therapist    Ilana Ireland Physical Therapist          Occupational Therapy Education                 Title: PT OT SLP Therapies (In Progress)     Topic: Occupational Therapy (Done)     Point: ADL training (Done)     Description:   Instruct learner(s) on proper safety adaptation and remediation techniques during self care or transfers.   Instruct in proper use of assistive devices.              Learning Progress Summary           Patient Acceptance, E,TB, VU,DU by  at 10/7/2021 1426    Comment: ed pt and family on role of OT. benefit of therapy, POC w. OT. family and pt understand.   Family Acceptance, E,TB, VU,DU by  at 10/7/2021 1426    Comment: ed pt and family on role of OT. benefit of therapy, POC w. OT. family and pt understand.                   Point: Home exercise program (Done)     Description:   Instruct learner(s) on appropriate technique for monitoring, assisting and/or progressing therapeutic exercises/activities.              Learning Progress Summary           Patient Acceptance, E,TB, VU,DU by  at 10/7/2021 1426    Comment: ed pt and family on role of OT. benefit of therapy, POC w. OT. family and pt understand.   Family Acceptance, E,TB, VU,DU by  at 10/7/2021 1426    Comment: ed pt and family on role of OT. benefit of therapy, POC w. OT. family and pt understand.                   Point: Precautions (Done)     Description:   Instruct learner(s) on prescribed precautions during self-care and functional transfers.              Learning Progress Summary           Patient Acceptance, E,TB, VU,DU by  at 10/7/2021 1426     Comment: ed pt and family on role of OT. benefit of therapy, POC w. OT. family and pt understand.   Family Acceptance, E,TB, VU,DU by  at 10/7/2021 1426    Comment: ed pt and family on role of OT. benefit of therapy, POC w. OT. family and pt understand.                   Point: Body mechanics (Done)     Description:   Instruct learner(s) on proper positioning and spine alignment during self-care, functional mobility activities and/or exercises.              Learning Progress Summary           Patient Acceptance, E,TB, VU,DU by  at 10/7/2021 1426    Comment: ed pt and family on role of OT. benefit of therapy, POC w. OT. family and pt understand.   Family Acceptance, E,TB, VU,DU by  at 10/7/2021 1426    Comment: ed pt and family on role of OT. benefit of therapy, POC w. OT. family and pt understand.                               User Key     Initials Effective Dates Name Provider Type Discipline     06/16/21 -  Shawanda Mitchell, OTR Occupational Therapist OT              OT Recommendation and Plan  Planned Therapy Interventions (OT): activity tolerance training, functional balance retraining, occupation/activity based interventions, BADL retraining, transfer/mobility retraining, strengthening exercise, ROM/therapeutic exercise  Therapy Frequency (OT): 3 times/wk  Plan of Care Review  Plan of Care Reviewed With: patient  Progress: improving  Outcome Summary: pt evaluated for OT. pt admitted from home where he has assist w his shower at home so he doesn't fall and uses a shower chair. He was admitted w. gross hematuria.He is currently min A w tsf and walking, completed AROM EOB and dep w toileting skills. He has decr ROM in shoulders and R shoulder h/o frozen sh. pt has decr endurance and has rec therapy in the past. He understands his UE ex with therapist. cont OT to inc endurance , tsf, balance, and ADLs.     Time Calculation:   Time Calculation- OT     Row Name 10/07/21 1427             Time  Calculation- OT    OT Start Time  0855  -      OT Stop Time  0911  -      OT Time Calculation (min)  16 min  -      Total Timed Code Minutes- OT  8 minute(s)  -      OT Received On  10/07/21  -      OT - Next Appointment  10/08/21  -      OT Goal Re-Cert Due Date  10/21/21  -         Timed Charges    97364 - OT Therapeutic Exercise Minutes  8  -KP         Untimed Charges    OT Eval/Re-eval Minutes  8  -KP         Total Minutes    Timed Charges Total Minutes  8  -KP      Untimed Charges Total Minutes  8  -KP       Total Minutes  16  -KP        User Key  (r) = Recorded By, (t) = Taken By, (c) = Cosigned By    Initials Name Provider Type     Shawanda Mitchell OTR Occupational Therapist        Therapy Charges for Today     Code Description Service Date Service Provider Modifiers Qty    60384271314 HC OT THER PROC EA 15 MIN 10/7/2021 Shawanda Mitchell OTR GO 1    46469996374 HC OT EVAL LOW COMPLEXITY 2 10/7/2021 Shawanda Mitchell OTR GO 1               SANDIP Ch  10/7/2021

## 2021-10-07 NOTE — PLAN OF CARE
Goal Outcome Evaluation:           Progress: improving  Outcome Summary: CBI continued over night at moderate speed, urine is a dark pink to light pink color.  Daughter is at side and assist with all pt's care. Pt A&Ox3--little confusion at times. Recvd tylenol for back aches and febrile episode.

## 2021-10-07 NOTE — PROGRESS NOTES
LOS: 4 days     Chief Complaint/ Reason for encounter: CKD, KAUSHIK  Chief Complaint   Patient presents with   • Dysuria         Subjective     Patient is doing well today with no new complaints  Good appetite with no nausea or vomiting  No shortness of breath chest pain or edema  Voiding well with no dysuria  Dexter out, has not voided yet        Medical history reviewed:  History of Present Illness    Subjective    History taken from: Patient and chart    Vital Signs  Temp:  [97.4 °F (36.3 °C)-100.8 °F (38.2 °C)] 97.4 °F (36.3 °C)  Heart Rate:  [57-73] 57  Resp:  [16] 16  BP: ()/(48-67) 98/50       Wt Readings from Last 1 Encounters:   10/03/21 2104 68.3 kg (150 lb 9.2 oz)       Objective    Objective:  General Appearance:  Comfortable, chronically ill-appearing, in no acute distress and not in pain.  Awake, alert, oriented  HEENT: Mucous membranes moist, no injury, oropharynx clear  Lungs:  Normal effort and normal respiratory rate.  Breath sounds clear to auscultation.  No  respiratory distress.  No rales, decreased breath sounds or rhonchi.    Heart: Normal rate.  Regular rhythm.  S1 normal.  No murmur.   Abdomen: Abdomen is soft.  Bowel sounds are normal, no abdominal tenderness.  There is no rebound or guarding  Extremities: Normal range of motion.  Trace edema of bilateral lower extremities, distal pulses intact  Neurological: No focal motor or sensory deficits, pupils reactive  Skin:  Warm and dry.  No rash or cyanosis.       Results Review:    Intake/Output:   No intake or output data in the 24 hours ending 10/07/21 1359      DATA:  Radiology and Labs:  The following labs independently reviewed by me. Additional labs ordered for tomorrow a.m.  Interval notes, chart personally reviewed by me.   Old records independently reviewed showing CKD 3  The following radiologic studies independently viewed by me, findings CT abdomen and pelvis showing bladder nodule  New problems include cystoscopy with clot  evacuation  Discussed with patient/family    Risk/ complexity of medical care/ medical decision making moderate    Labs:   Recent Results (from the past 24 hour(s))   Basic Metabolic Panel    Collection Time: 10/07/21  9:59 AM    Specimen: Blood   Result Value Ref Range    Glucose 124 (H) 65 - 99 mg/dL    BUN 29 (H) 8 - 23 mg/dL    Creatinine 1.67 (H) 0.76 - 1.27 mg/dL    Sodium 137 136 - 145 mmol/L    Potassium 3.9 3.5 - 5.2 mmol/L    Chloride 105 98 - 107 mmol/L    CO2 24.1 22.0 - 29.0 mmol/L    Calcium 8.8 8.2 - 9.6 mg/dL    eGFR Non African Amer 38 (L) >60 mL/min/1.73    BUN/Creatinine Ratio 17.4 7.0 - 25.0    Anion Gap 7.9 5.0 - 15.0 mmol/L   CBC Auto Differential    Collection Time: 10/07/21  9:59 AM    Specimen: Blood   Result Value Ref Range    WBC 6.84 3.40 - 10.80 10*3/mm3    RBC 2.50 (L) 4.14 - 5.80 10*6/mm3    Hemoglobin 7.7 (L) 13.0 - 17.7 g/dL    Hematocrit 23.6 (L) 37.5 - 51.0 %    MCV 94.4 79.0 - 97.0 fL    MCH 30.8 26.6 - 33.0 pg    MCHC 32.6 31.5 - 35.7 g/dL    RDW 14.3 12.3 - 15.4 %    RDW-SD 48.3 37.0 - 54.0 fl    MPV 9.8 6.0 - 12.0 fL    Platelets 145 140 - 450 10*3/mm3    Neutrophil % 68.2 42.7 - 76.0 %    Lymphocyte % 15.4 (L) 19.6 - 45.3 %    Monocyte % 11.8 5.0 - 12.0 %    Eosinophil % 3.5 0.3 - 6.2 %    Basophil % 0.4 0.0 - 1.5 %    Immature Grans % 0.7 (H) 0.0 - 0.5 %    Neutrophils, Absolute 4.66 1.70 - 7.00 10*3/mm3    Lymphocytes, Absolute 1.05 0.70 - 3.10 10*3/mm3    Monocytes, Absolute 0.81 0.10 - 0.90 10*3/mm3    Eosinophils, Absolute 0.24 0.00 - 0.40 10*3/mm3    Basophils, Absolute 0.03 0.00 - 0.20 10*3/mm3    Immature Grans, Absolute 0.05 0.00 - 0.05 10*3/mm3    nRBC 0.0 0.0 - 0.2 /100 WBC       Radiology:  Imaging Results (Last 24 Hours)     ** No results found for the last 24 hours. **             Medications have been reviewed:  Current Facility-Administered Medications   Medication Dose Route Frequency Provider Last Rate Last Admin   • acetaminophen (TYLENOL) tablet 650 mg  650  mg Oral Q4H PRN Alen Murcia MD   650 mg at 10/06/21 2328   • ceFAZolin in dextrose (ANCEF) IVPB solution 2 g  2 g Intravenous Q12H Claudia Serna MD       • digoxin (LANOXIN) tablet 125 mcg  125 mcg Oral Daily With Lunch Mikal Collier Jr., MD   125 mcg at 10/07/21 1217   • famotidine (PEPCID) tablet 20 mg  20 mg Oral BID Mikal Collier Jr., MD       • ferrous sulfate tablet 325 mg  325 mg Oral Daily With Breakfast Mikal Collier Jr., MD   325 mg at 10/07/21 0745   • latanoprost (XALATAN) 0.005 % ophthalmic solution 1 drop  1 drop Both Eyes Nightly Mikal Collier Jr., MD   1 drop at 10/06/21 2137   • tamsulosin (FLOMAX) 24 hr capsule 0.4 mg  0.4 mg Oral Nightly Mikal Collier Jr., MD   0.4 mg at 10/06/21 2135       ASSESSMENT:  Acute kidney injury versus CKD.  Creatinine improved with fluids but a bit worse today  Gross hematuria status post cystoscopy with clot evacuation  UTI on antibiotics  Worsening anemia, in part acute blood loss anemia.  May have some component of anemia of CKD  Gastroesophageal reflux disease  BPH  Chronic CHF, unknown if systolic or diastolic        PLAN:   Review of records from [place] shows that his baseline creatinine is around 1.2-1.4  Creatinine slightly worse today but this was prior to Dexter removal  Voiding trial today  Hold diuretics for now  Status post cystoscopy with clot evacuation 10/6  Agree with  antibiotics and push oral fluids  Follow-up labs and electrolytes in a.m.     Discharge planning    Axel Aguirre MD   [facility]   Office phone number: [phone]  Answering service phone number: [phone]    10/07/21  13:59 EDT    Dictation performed using Dragon dictation software

## 2021-10-07 NOTE — PROGRESS NOTES
"Daily progress note    Chief complaint  Doing better  No new complaints  Dexter catheter removed   Family at bedside    History of present illness  89-year-old white male who lives at home with his wife with history of BPH chronic anemia osteoarthritis and gastroesophageal reflux disease presented to [facility] emergency room with bloody urine started day before yesterday. Patient also have dysuria and increased frequency prior to that. Patient denies any fever chest pain Hartness of breath abdominal pain nausea vomiting diarrhea. Patient work-up in ER revealed calli hematuria and UTI admit for management     REVIEW OF SYSTEMS  Unremarkable except generalized weakness    PHYSICAL EXAM   Blood pressure 98/50, pulse 57, temperature 97.4 °F (36.3 °C), temperature source Oral, resp. rate 16, height 175.3 cm (69\"), weight 68.3 kg (150 lb 9.2 oz), SpO2 99 %.    Constitutional: Pt. is awake and alert and in no distress.    HENT: Normocephalic and atraumatic.   Neck: Normal range of motion. Neck supple. No JVD present.   Cardiovascular: Normal rate, regular rhythm and normal heart sounds. Exam reveals no gallop and no friction rub.   No murmur heard.  Pulmonary/Chest: Effort normal and breath sounds normal. No stridor. No respiratory distress. No wheezes, no rales.   Abdominal: Soft. Bowel sounds are normal. No distension. There is no tenderness. There is no rebound and no guarding.   Musculoskeletal: Age-appropriate range of motion. No edema, tenderness or deformity.   Neurological: Pt. is awake and alert.  Cranial nerves II through XII are intact.  He has no focal neurologic deficits  Skin: Skin is warm and dry. No rash noted.  And cauterized is not diaphoretic. No erythema.   Psychiatric: Mood, affect normal.  He is pleasant and cooperative.    LAB RESULTS  Lab Results (last 24 hours)     Procedure Component Value Units Date/Time    Basic Metabolic Panel [[id number]]  (Abnormal) Collected: 10/07/21 0959    " Specimen: Blood Updated: 10/07/21 1052     Glucose 124 mg/dL      BUN 29 mg/dL      Creatinine 1.67 mg/dL      Sodium 137 mmol/L      Potassium 3.9 mmol/L      Chloride 105 mmol/L      CO2 24.1 mmol/L      Calcium 8.8 mg/dL      eGFR Non African Amer 38 mL/min/1.73      BUN/Creatinine Ratio 17.4     Anion Gap 7.9 mmol/L     Narrative:      GFR Normal >60  Chronic Kidney Disease <60  Kidney Failure <15      CBC & Differential [[id number]]  (Abnormal) Collected: 10/07/21 0959    Specimen: Blood Updated: 10/07/21 1032    Narrative:      The following orders were created for panel order CBC & Differential.  Procedure                               Abnormality         Status                     ---------                               -----------         ------                     CBC Auto Differential[[id number]]        Abnormal            Final result                 Please view results for these tests on the individual orders.    CBC Auto Differential [[id number]]  (Abnormal) Collected: 10/07/21 0959    Specimen: Blood Updated: 10/07/21 1032     WBC 6.84 10*3/mm3      RBC 2.50 10*6/mm3      Hemoglobin 7.7 g/dL      Hematocrit 23.6 %      MCV 94.4 fL      MCH 30.8 pg      MCHC 32.6 g/dL      RDW 14.3 %      RDW-SD 48.3 fl      MPV 9.8 fL      Platelets 145 10*3/mm3      Neutrophil % 68.2 %      Lymphocyte % 15.4 %      Monocyte % 11.8 %      Eosinophil % 3.5 %      Basophil % 0.4 %      Immature Grans % 0.7 %      Neutrophils, Absolute 4.66 10*3/mm3      Lymphocytes, Absolute 1.05 10*3/mm3      Monocytes, Absolute 0.81 10*3/mm3      Eosinophils, Absolute 0.24 10*3/mm3      Basophils, Absolute 0.03 10*3/mm3      Immature Grans, Absolute 0.05 10*3/mm3      nRBC 0.0 /100 WBC         Imaging Results (Last 24 Hours)     ** No results found for the last 24 hours. **          Current Facility-Administered Medications:   •  acetaminophen (TYLENOL) tablet 650 mg, 650 mg, Oral, Q4H PRN, Alen Murcia MD, 650 mg at 10/06/21 9187  •   ceFAZolin in dextrose (ANCEF) IVPB solution 2 g, 2 g, Intravenous, Q12H, Claudia Serna MD  •  digoxin (LANOXIN) tablet 125 mcg, 125 mcg, Oral, Daily With Lunch, Mikal Collier Jr., MD, 125 mcg at 10/07/21 1217  •  famotidine (PEPCID) tablet 20 mg, 20 mg, Oral, BID, Mikal Collier Jr., MD  •  ferrous sulfate tablet 325 mg, 325 mg, Oral, Daily With Breakfast, Mikal Collier Jr., MD, 325 mg at 10/07/21 0745  •  latanoprost (XALATAN) 0.005 % ophthalmic solution 1 drop, 1 drop, Both Eyes, Nightly, Mikal Collier Jr., MD, 1 drop at 10/06/21 2137  •  tamsulosin (FLOMAX) 24 hr capsule 0.4 mg, 0.4 mg, Oral, Nightly, Mikal Collier Jr., MD, 0.4 mg at 10/06/21 2135    ASSESSMENT  Acute MSSA UTI  Hematuria s/p cystoscopy with clot evacuation and cauterization  BPH  Chronic anemia   Acute kidney injury  Chronic kidney disease stage III  Gastroesophageal flux disease    PLAN  CPM  Postop care  Discontinue IVF  IV antibiotics per infectious disease  Transfuse 1 more unit PRBC  Urology consult appreciated  Nephrology to follow patient  Continue home medications  Stress ulcer DVT prophylaxis  Supportive care  DNR  PT/OT  Discussed with family and nursing staff  Follow closely further recommendation current hospital course    EVETTE PEREZ MD

## 2021-10-07 NOTE — PLAN OF CARE
Goal Outcome Evaluation:      1 unit of RBC's transfusing at present, A&O, room air, assist of 2 with walker/gait belt to bathroom and in room, assist of 2 to BCS, urinary output totally approximately 450 throughout shift and continues to have red color, no complaints of pain with urination, will continue to monitor.

## 2021-10-07 NOTE — PLAN OF CARE
Goal Outcome Evaluation:  Plan of Care Reviewed With: patient        Progress: improving  Outcome Summary: pt evaluated for OT. pt admitted from home where he has assist w his shower at home so he doesn't fall and uses a shower chair. He was admitted w. gross hematuria.He is currently min A w tsf and walking, completed AROM EOB and dep w toileting skills. He has decr ROM in shoulders and R shoulder h/o frozen sh. pt has decr endurance and has rec therapy in the past. He understands his UE ex with therapist. cont OT to inc endurance , tsf, balance, and ADLs.  OT wore all PPE, washed hands before/after.

## 2021-10-07 NOTE — PROGRESS NOTES
"  Infectious Diseases Progress Note    Claudia Serna MD     [facility]  [place]: 4 days  Patient Identification:  Name: Vinicio Samayoa  Age: 89 y.o.  Sex: male  :  1924  MRN: [id number]         Primary Care Physician: Adolfo Thacker MD            Subjective: Overall feeling better.  Had his catheter removed earlier today.  Daughter at the bedside conveyed to me that patient is not significantly interested in aggressive treatment with modern medicine and try to use his treatment such as garlic or homeopathic treatments.  I conveyed to her that it is his choice for he want to take care of himself our role here is to provide him the best treatment recommendation for the diagnosis that we have made and leave it up to him and the family to decide how they want to proceed and whether or not to heed those recommendations.  Interval History: See consultation note.    Objective:    Scheduled Meds:ceFAZolin, 2 g, Intravenous, Q8H  digoxin, 125 mcg, Oral, Daily With Lunch  famotidine, 20 mg, Oral, BID  ferrous sulfate, 325 mg, Oral, Daily With Breakfast  latanoprost, 1 drop, Both Eyes, Nightly  tamsulosin, 0.4 mg, Oral, Nightly      Continuous Infusions:     Vital signs in last 24 hours:  Temp:  [97 °F (36.1 °C)-100.8 °F (38.2 °C)] 97.5 °F (36.4 °C)  Heart Rate:  [63-89] 71  Resp:  [16] 16  BP: ()/(46-72) 108/62    Intake/Output:    Intake/Output Summary (Last 24 hours) at 10/7/2021 0708  Last data filed at 10/6/2021 1001  Gross per 24 hour   Intake 300 ml   Output 150 ml   Net 150 ml       Exam:  /62 (BP Location: Left arm, Patient Position: Lying)   Pulse 71   Temp 97.5 °F (36.4 °C) (Oral)   Resp 16   Ht 175.3 cm (69\")   Wt 68.3 kg (150 lb 9.2 oz)   SpO2 99%   BMI 22.24 kg/m²   Patient is examined using the personal protective equipment as per guidelines from infection control for this particular patient as enacted.  Hand washing was performed before and after patient " interaction.  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   Head:    Normocephalic, without obvious abnormality, atraumatic   Eyes:    PERRL, conjunctivae/corneas clear, EOM's intact, both eyes   Ears:    Normal external ear canals, both ears   Nose:   Nares normal, septum midline, mucosa normal, no drainage    or sinus tenderness   Throat:   Lips, tongue, gums normal; oral mucosa pink and moist   Neck:   Supple, symmetrical, trachea midline, no adenopathy;     thyroid:  no enlargement/tenderness/nodules; no carotid    bruit or JVD   Back:    Decreased tenderness in the left sacroiliac area.   Lungs:     Clear to auscultation bilaterally, respirations unlabored   Chest Wall:    No tenderness or deformity    Heart:    Regular rate and rhythm, S1 and S2    Abdomen:     Soft nontender no organomegaly detected.  Three-way catheter is out.   Extremities:   Extremities normal, atraumatic, no cyanosis or edema   Pulses:   Pulses palpable in all extremities; symmetric all extremities   Skin:   Skin color normal, Skin is warm and dry,  no rashes or palpable lesions   Neurologic:  Legally blind but able to move lower extremities.            Data Review:    I reviewed the patient's new clinical results.  Results from last 7 days   Lab Units 10/06/21  0659 10/05/21  0949 10/04/21  0757 10/03/21  1723   WBC 10*3/mm3 7.25 6.15 6.02 6.50   HEMOGLOBIN g/dL 8.7* 7.9* 8.3* 9.4*   PLATELETS 10*3/mm3 140 150 148 149     Results from last 7 days   Lab Units 10/06/21  0659 10/05/21  0949 10/04/21  0757 10/03/21  1723   SODIUM mmol/L 138 140 139 139   POTASSIUM mmol/L 4.2 4.5 4.7 5.1   CHLORIDE mmol/L 106 108* 105 104   CO2 mmol/L 24.5 22.8 24.8 26.8   BUN mg/dL 26* 27* 30* 34*   CREATININE mg/dL 1.27 1.28* 1.46* 1.63*   CALCIUM mg/dL 8.8 8.8 9.2 9.5   GLUCOSE mg/dL 101* 83 78 113*     Microbiology Results  (last 10 days)     Procedure Component Value - Date/Time    Urine Culture - Urine, Urine, Random Void [[id number]]  (Abnormal)  (Susceptibility) Collected: 10/03/21 2002    Lab Status: Final result Specimen: Urine, Random Void Updated: 10/06/21 1035     Urine Culture >100,000 CFU/mL Staphylococcus aureus     Comment: Staphylococcus aureus is not typically regarded as a uropathogen, except in cases with genitourinary instrumentation present.  It's presence suggests an alternate source (e.g. bloodstream, abscess, SSTI, etc.).  Please evaluate accordingly.       Susceptibility      Staphylococcus aureus      MARIA ISABEL      Nitrofurantoin Susceptible      Oxacillin Susceptible      Tetracycline Susceptible      Trimethoprim + Sulfamethoxazole Susceptible      Vancomycin Susceptible               Linear View               Susceptibility Comments     Staphylococcus aureus    This isolate does not demonstrate inducible clindamycin resistance in vitro.               COVID PRE-OP / PRE-PROCEDURE SCREENING ORDER (NO ISOLATION) - Swab, Nasopharynx [[id number]]  (Normal) Collected: 10/03/21 1839    Lab Status: Final result Specimen: Swab from Nasopharynx Updated: 10/04/21 0048    Narrative:      The following orders were created for panel order COVID PRE-OP / PRE-PROCEDURE SCREENING ORDER (NO ISOLATION) - Swab, Nasopharynx.  Procedure                               Abnormality         Status                     ---------                               -----------         ------                     COVID-19,APTIMA PANTHER(...[[id number]]  Normal              Final result                 Please view results for these tests on the individual orders.    COVID-19,APTIMA PANTHER(PRAFUL), JINNY, NP/OP SWAB IN UTM/VTM/SALINE TRANSPORT MEDIA,24 HR TAT - Swab, Nasopharynx [[id number]]  (Normal) Collected: 10/03/21 1839    Lab Status: Final result Specimen: Swab from Nasopharynx Updated: 10/04/21 0048     COVID19 Not Detected    Narrative:      Fact  sheet for providers: https://www.fda.gov/media/195118/download     Fact sheet for patients: https://www.fda.gov/media/993813/download    Test performed by RT PCR.            Assessment:  1-possible systemic staph aureus sepsis with transient bacteremia and then subsequent passive filtration in the  tract and has positive culture especially the fact that he is complaining of low back pain and left SI joint tenderness versus -with risk of other areas of seeding including possibility of endovascular infection -focal dilatation/bulge of the posterior margin of the abdominal aorta noted on the CT scan of the abdomen and pelvis on 10/4/2021, discitis osteomyelitis or sacroiliitis.  2-ascending  infection from prior intermittent catheterization  B- failed elderly male with multiple comorbidities  C-hematuria with possibilities include primary  malignancy -nodular mass seen in the urinary bladder on CT scan of the abdomen pelvis performed on 10/4/2021, prostatic bleeding or UTI  D- history of benign prostatic hyperplasia  E- other diagnosis per primary team.           Recommendations/Discussions:  See my discussion and recommendations on 10/6/2021.  After having long conversation with the daughter who is a nurse I recommend 10 to 14 days course of oral antibiotics for staph aureus urinary tract infection-Keflex 500 p.o. every 8 hours for 10 days-and approaching it as if his ascending infection because of the prior catheterizations he had in the later part of last year earlier this year when he was being managed for CHF and was being diuresed.  I explained to her that subsequent clinical course after completion of antibiotics would dictate whether or approach is correct or if he deteriorates and shows signs and symptoms of disseminated infection in other parts of the body then he may need reevaluation and based on the diagnosis made further treatment recommendation can be afforded.  It is at this juncture daughter  indicated that patient usually believes in natural medicine and care and would like to use garlic or homeopathic medicine treatment to take care of his infection.  I conveyed to the daughter that it is perfectly reasonable for patient at the age of 89 to decide how he wants to take care of himself as long as he has been given proper recommendations for his situation that he is in.  Patient's daughter was very thankful.  Claudia Serna MD  10/7/2021  07:08 EDT    Much of this encounter note is an electronic transcription/translation of spoken language to printed text. The electronic translation of spoken language may permit erroneous, or at times, nonsensical words or phrases to be inadvertently transcribed; Although I have reviewed the note for such errors, some may still exist

## 2021-10-07 NOTE — PLAN OF CARE
Goal Outcome Evaluation:  Plan of Care Reviewed With: patient, spouse, daughter        Progress: improving  Outcome Summary: Patient is a 89 yo male who presented with hematuria. PMHx includes GERD and BPH. Pt is legally blind L eye. Pt lives with spouse and adult children. Pt has stair lift to enter home. Pt is ind at baseline with use of rwx. Pt presents today with decreased activity tolerance, safety awareness, and strength. Pt required CGA for supine to sitting EOB and STS to rwx. Pt ambulated 40ft using rwx requiring CGA/Kvng. Pt impulsive at times with use of rwx and decreased safety awareness. No LOB during ambulation. Pt will benefit from skilled PT to increase level of independence. Anticipate home with 24/7 care and HHPT. Family educated to provide CGA and use of gait belt at discharge for safety.    Patient was not wearing a face mask during this therapy encounter. Therapist used appropriate personal protective equipment including eye protection, mask, and gloves.  Mask used was standard procedure mask. Appropriate PPE was worn during the entire therapy session. Hand hygiene was completed before and after therapy session. Patient is not in enhanced droplet precautions.

## 2021-10-07 NOTE — THERAPY EVALUATION
Patient Name: Vinicio Samayoa  : 1924    MRN: [id number]                              Today's Date: 10/7/2021       Admit Date: 10/3/2021    Visit Dx:     ICD-10-CM ICD-9-CM   1. Gross hematuria  R31.0 599.71     Patient Active Problem List   Diagnosis   • Gross hematuria     Past Medical History:   Diagnosis Date   • Cancer (HCC)     lt ear    • CHF (congestive heart failure) (HCC)    • Deaf, left     Partial hearing Rt ear    • Fractures     collar bone, vertebrae   • Kidney stones     40 yrs ago      Past Surgical History:   Procedure Laterality Date   • CATARACT EXTRACTION Bilateral    • CORONARY ARTERY BYPASS GRAFT      4 vessel-30 yrs ago    • CYSTOSCOPY WITH CLOT EVACUATION N/A 10/6/2021    Procedure: CYSTOSCOPY WITH CLOT EVACUATION, FULGURATION OF BLEEDING, & CATHETER PLACEMENT;  Surgeon: Mikal Collier Jr., MD;  Location: [facility];  Service: Urology;  Laterality: N/A;   • TONSILLECTOMY       General Information     Row Name 10/07/21 1419          Physical Therapy Time and Intention    Document Type  evaluation;therapy note (daily note)  -CB     Mode of Treatment  individual therapy;physical therapy  -CB     Row Name 10/07/21 1419          General Information    Patient Profile Reviewed  yes  -CB     Prior Level of Function  independent:;gait;transfer;bed mobility rwx  -CB     Existing Precautions/Restrictions  fall  -CB     Barriers to Rehab  none identified  -CB     Row Name 10/07/21 141          Living Environment    Lives With  child(carol), adult;spouse  -CB     Row Name 10/07/21 141          Home Main Entrance    Number of Stairs, Main Entrance  -- stair lift  -CB     Row Name 10/07/21 141          Cognition    Orientation Status (Cognition)  oriented x 3  -CB     Row Name 10/07/21 1419          Safety Issues, Functional Mobility    Safety Issues Affecting Function (Mobility)  insight into deficits/self-awareness;awareness of need for assistance;impulsivity  -CB     Impairments  Affecting Function (Mobility)  balance;strength;endurance/activity tolerance  -CB     Comment, Safety Issues/Impairments (Mobility)  gait belt and non skid socks  -CB       User Key  (r) = Recorded By, (t) = Taken By, (c) = Cosigned By    Initials Name Provider Type    Ilana Ireland Physical Therapist        Mobility     Row Name 10/07/21 1420          Bed Mobility    Bed Mobility  supine-sit  -CB     Supine-Sit [place] (Bed Mobility)  contact guard;verbal cues  -CB     Assistive Device (Bed Mobility)  head of bed elevated  -CB     Row Name 10/07/21 1420          Sit-Stand Transfer    Sit-Stand [place] (Transfers)  contact guard;verbal cues  -CB     Assistive Device (Sit-Stand Transfers)  walker, front-wheeled  -CB     Row Name 10/07/21 1420          Gait/Stairs (Locomotion)    [place] Level (Gait)  contact guard;minimum assist (75% patient effort);verbal cues  -CB     Assistive Device (Gait)  walker, front-wheeled  -CB     Distance in Feet (Gait)  40ft  -CB     Deviations/Abnormal Patterns (Gait)  gait speed decreased;stride length decreased  -CB     Bilateral Gait Deviations  heel strike decreased  -CB     Comment (Gait/Stairs)  pt impulsive during ambulation and movement of rwx. no LOB.  -CB       User Key  (r) = Recorded By, (t) = Taken By, (c) = Cosigned By    Initials Name Provider Type    Ilana Ireland Physical Therapist        Obj/Interventions     Row Name 10/07/21 1420          Range of Motion Comprehensive    General Range of Motion  bilateral lower extremity ROM WFL  -CB     Row Name 10/07/21 1420          Strength Comprehensive (MMT)    Comment, General Manual Muscle Testing (MMT) Assessment  generalized LE weakness  -CB     Row Name 10/07/21 1420          Balance    Balance Assessment  sitting static balance;sitting dynamic balance;standing static balance;standing dynamic balance  -CB     Static Sitting Balance  WFL;unsupported  -CB     Dynamic Sitting Balance  WFL;unsupported   -CB     Static Standing Balance  mild impairment;supported  -CB     Dynamic Standing Balance  mild impairment;supported  -CB     Row Name 10/07/21 1420          Sensory Assessment (Somatosensory)    Sensory Assessment (Somatosensory)  LE sensation intact  -CB       User Key  (r) = Recorded By, (t) = Taken By, (c) = Cosigned By    Initials Name Provider Type    Ilana Ireland Physical Therapist        Goals/Plan     Row Name 10/07/21 1425          Bed Mobility Goal 1 (PT)    Activity/Assistive Device (Bed Mobility Goal 1, PT)  bed mobility activities, all  -CB     [place] Level/Cues Needed (Bed Mobility Goal 1, PT)  standby assist  -CB     Time Frame (Bed Mobility Goal 1, PT)  long term goal (LTG);1 week  -CB     Row Name 10/07/21 1425          Transfer Goal 1 (PT)    Activity/Assistive Device (Transfer Goal 1, PT)  sit-to-stand/stand-to-sit;bed-to-chair/chair-to-bed  -CB     [place] Level/Cues Needed (Transfer Goal 1, PT)  contact guard assist  -CB     Time Frame (Transfer Goal 1, PT)  long term goal (LTG);1 week  -CB     Row Name 10/07/21 1425          Gait Training Goal 1 (PT)    Activity/Assistive Device (Gait Training Goal 1, PT)  gait (walking locomotion);walker, rolling  -CB     [place] Level (Gait Training Goal 1, PT)  contact guard assist  -CB     Distance (Gait Training Goal 1, PT)  75ft  -CB     Time Frame (Gait Training Goal 1, PT)  long term goal (LTG);1 week  -CB       User Key  (r) = Recorded By, (t) = Taken By, (c) = Cosigned By    Initials Name Provider Type    Ilana Ireland Physical Therapist        Clinical Impression     Row Name 10/07/21 1421          Pain    Additional Documentation  Pain Scale: Numbers Pre/Post-Treatment (Group)  -CB     Row Name 10/07/21 1421          Pain Scale: Numbers Pre/Post-Treatment    Pretreatment Pain Rating  0/10 - no pain  -CB     Posttreatment Pain Rating  0/10 - no pain  -CB     Row Name 10/07/21 1421          Plan of Care Review    Plan of  Care Reviewed With  patient;spouse;daughter  -CB     Progress  improving  -CB     Outcome Summary  Patient is a 89 yo male who presented with hematuria. PMHx includes GERD and BPH. Pt is legally blind L eye. Pt lives with spouse and adult children. Pt has stair lift to enter home. Pt is ind at baseline with use of rwx. Pt presents today with decreased activity tolerance, safety awareness, and strength. Pt required CGA for supine to sitting EOB and STS to rwx. Pt ambulated 40ft using rwx requiring CGA/Kvng. Pt impulsive at times with use of rwx and decreased safety awareness. No LOB during ambulation. Pt will benefit from skilled PT to increase level of independence. Anticipate home with 24/7 care and HHPT. Family educated to provide CGA and use of gait belt at discharge for safety.  -CB     Row Name 10/07/21 1422          Therapy Assessment/Plan (PT)    Patient/Family Therapy Goals Statement (PT)  to go home  -CB     Rehab Potential (PT)  good, to achieve stated therapy goals  -CB     Criteria for Skilled Interventions Met (PT)  yes  -CB     Row Name 10/07/21 1428          Positioning and Restraints    Pre-Treatment Position  in bed  -CB     Post Treatment Position  bathroom  -CB     Bathroom  sitting;call light within reach;encouraged to call for assist;notified nsg;with family/caregiver RN notified and family present  -CB       User Key  (r) = Recorded By, (t) = Taken By, (c) = Cosigned By    Initials Name Provider Type    CB Ilana Henry Physical Therapist        Outcome Measures     Row Name 10/07/21 1422          How much help from another person do you currently need...    Turning from your back to your side while in flat bed without using bedrails?  3  -CB     Moving from lying on back to sitting on the side of a flat bed without bedrails?  3  -CB     Moving to and from a bed to a chair (including a wheelchair)?  3  -CB     Standing up from a chair using your arms (e.g., wheelchair, bedside chair)?  3  -CB      Climbing 3-5 steps with a railing?  2  -CB     To walk in hospital room?  3  -CB     AM-PAC 6 Clicks Score (PT)  17  -CB     Row Name 10/07/21 1426 10/07/21 1425       Functional Assessment    Outcome Measure Options  AM-PAC 6 Clicks Basic Mobility (PT)  -CB  AM-PAC 6 Clicks Daily Activity (OT)  -      User Key  (r) = Recorded By, (t) = Taken By, (c) = Cosigned By    Initials Name Provider Type    Shawanda Villareal, OTR Occupational Therapist    Ilana Ireland Physical Therapist                       Physical Therapy Education                 Title: PT OT SLP Therapies (In Progress)     Topic: Physical Therapy (In Progress)     Point: Mobility training (Done)     Learning Progress Summary           Patient Acceptance, E,TB,D, VU,NR by  at 10/7/2021 1427                   Point: Home exercise program (Not Started)     Learner Progress:  Not documented in this visit.          Point: Body mechanics (Done)     Learning Progress Summary           Patient Acceptance, E,TB,D, VU,NR by  at 10/7/2021 1427                   Point: Precautions (Done)     Learning Progress Summary           Patient Acceptance, E,TB,D, VU,NR by  at 10/7/2021 1427                               User Key     Initials Effective Dates Name Provider Type Discipline     12/30/20 -  Ilana eHnry Physical Therapist PT              PT Recommendation and Plan  Planned Therapy Interventions (PT): balance training, bed mobility training, gait training, home exercise program, patient/family education, strengthening, transfer training  Plan of Care Reviewed With: patient, spouse, daughter  Progress: improving  Outcome Summary: Patient is a 89 yo male who presented with hematuria. PMHx includes GERD and BPH. Pt is legally blind L eye. Pt lives with spouse and adult children. Pt has stair lift to enter home. Pt is ind at baseline with use of rwx. Pt presents today with decreased activity tolerance, safety awareness, and strength. Pt  required CGA for supine to sitting EOB and STS to rwx. Pt ambulated 40ft using rwx requiring CGA/Kvng. Pt impulsive at times with use of rwx and decreased safety awareness. No LOB during ambulation. Pt will benefit from skilled PT to increase level of independence. Anticipate home with 24/7 care and HHPT. Family educated to provide CGA and use of gait belt at discharge for safety.     Time Calculation:   PT Charges     Row Name 10/07/21 1426             Time Calculation    Start Time  1358  -CB      Stop Time  1415  -CB      Time Calculation (min)  17 min  -CB      PT Received On  10/07/21  -CB      PT - Next Appointment  10/08/21  -CB      PT Goal Re-Cert Due Date  10/14/21  -CB         Time Calculation- PT    Total Timed Code Minutes- PT  12 minute(s)  -CB         Timed Charges    44316 - PT Therapeutic Activity Minutes  12  -CB         Untimed Charges    PT Eval/Re-eval Minutes  10  -CB         Total Minutes    Timed Charges Total Minutes  12  -CB      Untimed Charges Total Minutes  10  -CB       Total Minutes  22  -CB        User Key  (r) = Recorded By, (t) = Taken By, (c) = Cosigned By    Initials Name Provider Type    CB Ilana Henry Physical Therapist        Therapy Charges for Today     Code Description Service Date Service Provider Modifiers Qty    41589137062 HC PT THERAPEUTIC ACT EA 15 MIN 10/7/2021 Ilana Henry GP 1    [id number] HC PT EVAL MOD COMPLEXITY 2 10/7/2021 Ilana Henry GP 1          PT G-Codes  Outcome Measure Options: AM-PAC 6 Clicks Basic Mobility (PT)  AM-PAC 6 Clicks Score (PT): 17  AM-PAC 6 Clicks Score (OT): 13    Ilana Henry  10/7/2021

## 2021-10-07 NOTE — PROGRESS NOTES
"   LOS: 4 days   Patient Care Team:  Adolfo Thacker MD as PCP - General (Internal Medicine)      Subjective   Interval History: POD 1 fro cysto clot evacuation  VSS    Objective         Vital Signs  Temp:  [97 °F (36.1 °C)-100.8 °F (38.2 °C)] 97.5 °F (36.4 °C)  Heart Rate:  [63-89] 71  Resp:  [16] 16  BP: ()/(46-72) 108/62      Intake/Output Summary (Last 24 hours) at 10/7/2021 0656  Last data filed at 10/6/2021 1001  Gross per 24 hour   Intake 300 ml   Output 2150 ml   Net -1850 ml       Flowsheet Rows      First Filed Value   Admission Height  175.3 cm (69\") Documented at 10/03/2021 2104   Admission Weight  68.3 kg (150 lb 9.2 oz) Documented at 10/03/2021 2104          Physical Exam:     General velvet: alert, cooperative, oriented  Lungs:  Clear, No acute distress  Heart:  Regular, rate and rhythm  ABD: soft and nt  Genitalia:  normal  Extremities: normal, no edema  Skin:  Skin color, texture, turgor normal, no rashes        Results Review:      Lab Results (all)     Procedure Component Value Units Date/Time    Blood Culture - Blood, Arm, Right [[id number]] Collected: 10/06/21 1657    Specimen: Blood from Arm, Right Updated: 10/06/21 1708    Blood Culture - Blood, Arm, Left [[id number]] Collected: 10/06/21 1648    Specimen: Blood from Arm, Left Updated: 10/06/21 1708    PSA DIAGNOSTIC [[id number]]  (Abnormal) Collected: 10/06/21 0659    Specimen: Blood Updated: 10/06/21 1509     PSA 36.900 ng/mL     Narrative:      Results may be falsely decreased if patient taking Biotin.      Urine Culture - Urine, Urine, Random Void [[id number]]  (Abnormal)  (Susceptibility) Collected: 10/03/21 2002    Specimen: Urine, Random Void Updated: 10/06/21 1035     Urine Culture >100,000 CFU/mL Staphylococcus aureus     Comment: Staphylococcus aureus is not typically regarded as a uropathogen, except in cases with genitourinary instrumentation present.  It's presence suggests an alternate source (e.g. bloodstream, abscess, SSTI, " etc.).  Please evaluate accordingly.       Susceptibility      Staphylococcus aureus      MARIA ISABEL      Nitrofurantoin Susceptible      Oxacillin Susceptible      Tetracycline Susceptible      Trimethoprim + Sulfamethoxazole Susceptible      Vancomycin Susceptible               Linear View               Susceptibility Comments     Staphylococcus aureus    This isolate does not demonstrate inducible clindamycin resistance in vitro.               CBC & Differential [[id number]]  (Abnormal) Collected: 10/06/21 0659    Specimen: Blood Updated: 10/06/21 0753    Narrative:      The following orders were created for panel order CBC & Differential.  Procedure                               Abnormality         Status                     ---------                               -----------         ------                     CBC Auto Differential[[id number]]        Abnormal            Final result                 Please view results for these tests on the individual orders.    CBC Auto Differential [[id number]]  (Abnormal) Collected: 10/06/21 0659    Specimen: Blood Updated: 10/06/21 0753     WBC 7.25 10*3/mm3      RBC 2.76 10*6/mm3      Hemoglobin 8.7 g/dL      Hematocrit 25.8 %      MCV 93.5 fL      MCH 31.5 pg      MCHC 33.7 g/dL      RDW 14.5 %      RDW-SD 49.3 fl      MPV 9.8 fL      Platelets 140 10*3/mm3      Neutrophil % 69.0 %      Lymphocyte % 18.3 %      Monocyte % 10.5 %      Eosinophil % 1.2 %      Basophil % 0.4 %      Immature Grans % 0.6 %      Neutrophils, Absolute 5.00 10*3/mm3      Lymphocytes, Absolute 1.33 10*3/mm3      Monocytes, Absolute 0.76 10*3/mm3      Eosinophils, Absolute 0.09 10*3/mm3      Basophils, Absolute 0.03 10*3/mm3      Immature Grans, Absolute 0.04 10*3/mm3      nRBC 0.0 /100 WBC     Basic Metabolic Panel [[id number]]  (Abnormal) Collected: 10/06/21 0659    Specimen: Blood Updated: 10/06/21 0726     Glucose 101 mg/dL      BUN 26 mg/dL      Creatinine 1.27 mg/dL      Sodium 138 mmol/L      Potassium  4.2 mmol/L      Chloride 106 mmol/L      CO2 24.5 mmol/L      Calcium 8.8 mg/dL      eGFR Non African Amer 53 mL/min/1.73      BUN/Creatinine Ratio 20.5     Anion Gap 7.5 mmol/L     Narrative:      GFR Normal >60  Chronic Kidney Disease <60  Kidney Failure <15      Hemoglobin A1c [[id number]]  (Abnormal) Collected: 10/05/21 0949    Specimen: Blood Updated: 10/05/21 1250     Hemoglobin A1C 5.82 %     Narrative:      Hemoglobin A1C Ranges:    Increased Risk for Diabetes  5.7% to 6.4%  Diabetes                     >= 6.5%  Diabetic Goal                < 7.0%    TSH [[id number]]  (Normal) Collected: 10/05/21 0949    Specimen: Blood Updated: 10/05/21 1129     TSH 2.000 uIU/mL     BNP [[id number]]  (Abnormal) Collected: 10/05/21 0949    Specimen: Blood Updated: 10/05/21 1129     proBNP 14,958.0 pg/mL     Narrative:      Among patients with dyspnea, NT-proBNP is highly sensitive for the detection of acute congestive heart failure. In addition NT-proBNP of <300 pg/ml effectively rules out acute congestive heart failure with 99% negative predictive value.    Results may be falsely decreased if patient taking Biotin.      Comprehensive Metabolic Panel [[id number]]  (Abnormal) Collected: 10/05/21 0949    Specimen: Blood Updated: 10/05/21 1122     Glucose 83 mg/dL      BUN 27 mg/dL      Creatinine 1.28 mg/dL      Sodium 140 mmol/L      Potassium 4.5 mmol/L      Chloride 108 mmol/L      CO2 22.8 mmol/L      Calcium 8.8 mg/dL      Total Protein 6.1 g/dL      Albumin 3.30 g/dL      ALT (SGPT) 9 U/L      AST (SGOT) 19 U/L      Alkaline Phosphatase 50 U/L      Total Bilirubin 0.6 mg/dL      eGFR Non African Amer 52 mL/min/1.73      Globulin 2.8 gm/dL      A/G Ratio 1.2 g/dL      BUN/Creatinine Ratio 21.1     Anion Gap 9.2 mmol/L     Narrative:      GFR Normal >60  Chronic Kidney Disease <60  Kidney Failure <15      Digoxin Level [[id number]]  (Normal) Collected: 10/05/21 0949    Specimen: Blood Updated: 10/05/21 1122     Digoxin 1.00  ng/mL     Phosphorus [[id number]]  (Normal) Collected: 10/05/21 0949    Specimen: Blood Updated: 10/05/21 1122     Phosphorus 2.8 mg/dL     Lipid Panel [[id number]] Collected: 10/05/21 0949    Specimen: Blood Updated: 10/05/21 1122     Total Cholesterol 126 mg/dL      Triglycerides 52 mg/dL      HDL Cholesterol 48 mg/dL      LDL Cholesterol  66 mg/dL      VLDL Cholesterol 12 mg/dL      LDL/HDL Ratio 1.41    Narrative:      Cholesterol Reference Ranges  (U.[place]. Department of Health and Human Services ATP III Classifications)    Desirable          <200 mg/dL  Borderline High    200-239 mg/dL  High Risk          >240 mg/dL      Triglyceride Reference Ranges  (U.S. Department of Health and Human Services ATP III Classifications)    Normal           <150 mg/dL  Borderline High  150-199 mg/dL  High             200-499 mg/dL  Very High        >500 mg/dL    HDL Reference Ranges  (U.S. Department of Health and Human Services ATP III Classifcations)    Low     <40 mg/dl (major risk factor for CHD)  High    >60 mg/dl ('negative' risk factor for CHD)        LDL Reference Ranges  (U.S. Department of Health and Human Services ATP III Classifcations)    Optimal          <100 mg/dL  Near Optimal     100-129 mg/dL  Borderline High  130-159 mg/dL  High             160-189 mg/dL  Very High        >189 mg/dL    Magnesium [[id number]]  (Normal) Collected: 10/05/21 0949    Specimen: Blood Updated: 10/05/21 1122     Magnesium 2.0 mg/dL     CBC & Differential [[id number]]  (Abnormal) Collected: 10/05/21 0949    Specimen: Blood Updated: 10/05/21 1102    Narrative:      The following orders were created for panel order CBC & Differential.  Procedure                               Abnormality         Status                     ---------                               -----------         ------                     CBC Auto Differential[[id number]]        Abnormal            Final result                 Please view results for these tests on the  individual orders.    CBC Auto Differential [[id number]]  (Abnormal) Collected: 10/05/21 0949    Specimen: Blood Updated: 10/05/21 1102     WBC 6.15 10*3/mm3      RBC 2.53 10*6/mm3      Hemoglobin 7.9 g/dL      Hematocrit 24.7 %      MCV 97.6 fL      MCH 31.2 pg      MCHC 32.0 g/dL      RDW 12.4 %      RDW-SD 44.5 fl      MPV 9.7 fL      Platelets 150 10*3/mm3      Neutrophil % 75.9 %      Lymphocyte % 13.7 %      Monocyte % 8.9 %      Eosinophil % 1.0 %      Basophil % 0.2 %      Immature Grans % 0.3 %      Neutrophils, Absolute 4.67 10*3/mm3      Lymphocytes, Absolute 0.84 10*3/mm3      Monocytes, Absolute 0.55 10*3/mm3      Eosinophils, Absolute 0.06 10*3/mm3      Basophils, Absolute 0.01 10*3/mm3      Immature Grans, Absolute 0.02 10*3/mm3      nRBC 0.0 /100 WBC     Basic Metabolic Panel [[id number]]  (Abnormal) Collected: 10/04/21 0757    Specimen: Blood Updated: 10/04/21 0948     Glucose 78 mg/dL      BUN 30 mg/dL      Creatinine 1.46 mg/dL      Sodium 139 mmol/L      Potassium 4.7 mmol/L      Chloride 105 mmol/L      CO2 24.8 mmol/L      Calcium 9.2 mg/dL      eGFR Non African Amer 45 mL/min/1.73      BUN/Creatinine Ratio 20.5     Anion Gap 9.2 mmol/L     Narrative:      GFR Normal >60  Chronic Kidney Disease <60  Kidney Failure <15      CBC & Differential [[id number]]  (Abnormal) Collected: 10/04/21 0757    Specimen: Blood Updated: 10/04/21 0908    Narrative:      The following orders were created for panel order CBC & Differential.  Procedure                               Abnormality         Status                     ---------                               -----------         ------                     CBC Auto Differential[[id number]]        Abnormal            Final result                 Please view results for these tests on the individual orders.    CBC Auto Differential [[id number]]  (Abnormal) Collected: 10/04/21 0757    Specimen: Blood Updated: 10/04/21 0908     WBC 6.02 10*3/mm3      RBC 2.58 10*6/mm3       Hemoglobin 8.3 g/dL      Hematocrit 25.7 %      MCV 99.6 fL      MCH 32.2 pg      MCHC 32.3 g/dL      RDW 12.7 %      RDW-SD 46.1 fl      MPV 9.9 fL      Platelets 148 10*3/mm3      Neutrophil % 68.5 %      Lymphocyte % 20.4 %      Monocyte % 9.3 %      Eosinophil % 1.2 %      Basophil % 0.3 %      Immature Grans % 0.3 %      Neutrophils, Absolute 4.12 10*3/mm3      Lymphocytes, Absolute 1.23 10*3/mm3      Monocytes, Absolute 0.56 10*3/mm3      Eosinophils, Absolute 0.07 10*3/mm3      Basophils, Absolute 0.02 10*3/mm3      Immature Grans, Absolute 0.02 10*3/mm3      nRBC 0.0 /100 WBC     COVID PRE-OP / PRE-PROCEDURE SCREENING ORDER (NO ISOLATION) - Swab, Nasopharynx [[id number]]  (Normal) Collected: 10/03/21 1839    Specimen: Swab from Nasopharynx Updated: 10/04/21 0048    Narrative:      The following orders were created for panel order COVID PRE-OP / PRE-PROCEDURE SCREENING ORDER (NO ISOLATION) - Swab, Nasopharynx.  Procedure                               Abnormality         Status                     ---------                               -----------         ------                     COVID-19,APTIMA PANTHER(...[[id number]]  Normal              Final result                 Please view results for these tests on the individual orders.    COVID-19,APTIMA PANTHER(PRAFUL), JINNY, NP/OP SWAB IN UTM/VTM/SALINE TRANSPORT MEDIA,24 HR TAT - Swab, Nasopharynx [[id number]]  (Normal) Collected: 10/03/21 1839    Specimen: Swab from Nasopharynx Updated: 10/04/21 0048     COVID19 Not Detected    Narrative:      Fact sheet for providers: https://www.fda.gov/media/368397/download     Fact sheet for patients: https://www.fda.gov/media/910270/download    Test performed by RT PCR.    Urinalysis, Microscopic Only - Urine, Random Void [[id number]]  (Abnormal) Collected: 10/03/21 2002    Specimen: Urine, Random Void Updated: 10/03/21 2025     RBC, UA Too Numerous to Count /HPF      WBC, UA       Unable to determine due to loaded field      /HPF     Bacteria, UA       Unable to determine due to loaded field     /HPF     Squamous Epithelial Cells, UA       Unable to determine due to loaded field     /HPF     Hyaline Casts, UA       Unable to determine due to loaded field     /LPF     Methodology Automated Microscopy    Urinalysis With Culture If Indicated - Urine, Random Void [[id number]]  (Abnormal) Collected: 10/03/21 2002    Specimen: Urine, Random Void Updated: 10/03/21 2023     Color, UA Red     Comment: Any Substance that causes an abnormal urine color can alter the accuracy of the chemical reactions.        Appearance, UA Turbid     pH, UA 7.5     Specific Gravity, UA 1.010     Glucose, UA Negative     Ketones, UA 15 mg/dL (1+)     Bilirubin, UA Negative     Blood, UA Large (3+)     Protein, UA >=300 mg/dL (3+)     Leuk Esterase, UA Large (3+)     Nitrite, UA Positive     Urobilinogen, UA >=8.0 E.U./dL    Narrative:      UA performed on centrifuged urine specimen.     Protime-INR [[id number]]  (Abnormal) Collected: 10/03/21 1723    Specimen: Blood Updated: 10/03/21 1756     Protime 14.9 Seconds      INR 1.19    aPTT [[id number]]  (Abnormal) Collected: 10/03/21 1723    Specimen: Blood Updated: 10/03/21 1756     PTT 39.1 seconds     Comprehensive Metabolic Panel [[id number]]  (Abnormal) Collected: 10/03/21 1723    Specimen: Blood Updated: 10/03/21 1752     Glucose 113 mg/dL      BUN 34 mg/dL      Creatinine 1.63 mg/dL      Sodium 139 mmol/L      Potassium 5.1 mmol/L      Chloride 104 mmol/L      CO2 26.8 mmol/L      Calcium 9.5 mg/dL      Total Protein 6.6 g/dL      Albumin 3.80 g/dL      ALT (SGPT) 10 U/L      AST (SGOT) 21 U/L      Alkaline Phosphatase 61 U/L      Total Bilirubin 0.5 mg/dL      eGFR Non African Amer 39 mL/min/1.73      Globulin 2.8 gm/dL      A/G Ratio 1.4 g/dL      BUN/Creatinine Ratio 20.9     Anion Gap 8.2 mmol/L     Narrative:      GFR Normal >60  Chronic Kidney Disease <60  Kidney Failure <15      CBC & Differential [[id number]]   (Abnormal) Collected: 10/03/21 1723    Specimen: Blood Updated: 10/03/21 1736    Narrative:      The following orders were created for panel order CBC & Differential.  Procedure                               Abnormality         Status                     ---------                               -----------         ------                     CBC Auto Differential[[id number]]        Abnormal            Final result                 Please view results for these tests on the individual orders.    CBC Auto Differential [[id number]]  (Abnormal) Collected: 10/03/21 1723    Specimen: Blood Updated: 10/03/21 1736     WBC 6.50 10*3/mm3      RBC 2.95 10*6/mm3      Hemoglobin 9.4 g/dL      Hematocrit 29.5 %      .0 fL      MCH 31.9 pg      MCHC 31.9 g/dL      RDW 12.7 %      RDW-SD 46.8 fl      MPV 9.5 fL      Platelets 149 10*3/mm3      Neutrophil % 79.3 %      Lymphocyte % 10.8 %      Monocyte % 8.6 %      Eosinophil % 0.8 %      Basophil % 0.3 %      Immature Grans % 0.2 %      Neutrophils, Absolute 5.16 10*3/mm3      Lymphocytes, Absolute 0.70 10*3/mm3      Monocytes, Absolute 0.56 10*3/mm3      Eosinophils, Absolute 0.05 10*3/mm3      Basophils, Absolute 0.02 10*3/mm3      Immature Grans, Absolute 0.01 10*3/mm3      nRBC 0.0 /100 WBC           Imaging Results (All)     Procedure Component Value Units Date/Time    CT Abdomen Pelvis With & Without Contrast [[id number]] Collected: 10/04/21 1901     Updated: 10/04/21 1917    Narrative:      CT ABDOMEN PELVIS W WO CONTRAST-     INDICATIONS: Hematuria     TECHNIQUE: Radiation dose reduction techniques were utilized, including  automated exposure control and exposure modulation based on body size.  Unenhanced and enhanced ABDOMEN AND PELVIS CT     COMPARISON: None available        FINDINGS:      Hepatic cyst is noted, as well as liver low densities too small to  characterize.     Pancreas is thinned.        Apparent gallstones in the gallbladder, slight strandy density  around  the gallbladder that could be evidence of cholecystitis, correlate  clinically.     Bilateral perinephric fat stranding suggests chronic change but could be  evidence of urinary infection likewise, gas in the urinary bladder may  be from catheterization, or could be evidence of infection. Note: The  Dexter catheter balloon is inflated within the prostatic urethra, advise  repositioning the catheter after deflating the balloon.     Nonobstructive calcifications are apparent in each kidney, as large as  about 3 mm, at least some of which appear to be arterial in location. A  left renal cyst is noted, with 3 mm calcification at the anterior margin  No hydronephrosis. Some portions of the right ureter are not opacified  on delayed postcontrast imaging, limiting assessment of the right  ureter. Likewise, the urinary bladder is not opacified by contrast  material on the delayed postcontrast images. At the left posterior  aspect of the urinary bladder, for example axial image 194, DICOM series  6, 1.5 cm nodular density may be related to prostatic impression or  urinary bladder wall lesion, direct visualization can be obtained.     Otherwise unremarkable unenhanced appearance of the liver, spleen,  adrenal glands, pancreas, kidneys, bladder.     No bowel obstruction or abnormal bowel thickening is identified. Colonic  diverticula are seen that do not appear inflamed. The appendix does not  appear inflamed.     No free intraperitoneal gas or free fluid.     Scattered small mesenteric and para-aortic lymph nodes are seen that are  not significant by size criteria.     Abdominal aorta shows focal bulging along the posterior margin,  measuring overall as much as 2.7 cm on axial image 82. Aortic and other  arterial calcifications are present.     The lung bases show small atelectasis, minimal pleural effusions.     Degenerative changes are seen in the spine. No acute fracture is  identified.             Impression:             1. At the left posterior aspect of the urinary bladder, 1.5 cm nodular  density may be related to prostatic impression or urinary bladder wall  lesion, direct visualization can be obtained. Nonobstructive  calcifications in each kidney, likely at least in part arterial in  location. Bilateral perinephric stranding, gas and urinary bladder,  correlate clinically to exclude possibility of urinary infection. Dexter  catheter balloon is inflated in the prostatic urethra, recommend  repositioning the catheter.  2. Gallstones in the gallbladder, with slight strandy density adjacent  to the gallbladder, could be evidence of cholecystitis, correlate  clinically.  3. Colonic diverticulosis. No acute inflammatory process of bowel is  identified, follow-up as indications persist.  4. Focal bulge along the posterior margin of the abdominal aorta.     Discussed by telephone with patient's nurse, Stephanie, at time of  interpretation, 1908, 10/04/2021.        This report was finalized on 10/4/2021 7:14 PM by Dr. Andrea Redd M.D.             Medication Review:   Current Facility-Administered Medications   Medication Dose Route Frequency Provider Last Rate Last Admin   • acetaminophen (TYLENOL) tablet 650 mg  650 mg Oral Q4H PRN Alen Murcia MD   650 mg at 10/06/21 2328   • ceFAZolin in dextrose (ANCEF) IVPB solution 2 g  2 g Intravenous Q8H Claudia Serna MD   2 g at 10/06/21 2328   • digoxin (LANOXIN) tablet 125 mcg  125 mcg Oral Daily With Lunch Mikal Collier Jr., MD   125 mcg at 10/06/21 1412   • famotidine (PEPCID) tablet 20 mg  20 mg Oral BID Mikal Collier Jr., MD       • ferrous sulfate tablet 325 mg  325 mg Oral Daily With Breakfast Mikal Collier Jr., MD   325 mg at 10/05/21 1951   • latanoprost (XALATAN) 0.005 % ophthalmic solution 1 drop  1 drop Both Eyes Nightly Mikal Collier Jr., MD   1 drop at 10/06/21 2137   • tamsulosin (FLOMAX) 24 hr capsule 0.4 mg  0.4 mg Oral Nightly Mikal Collier  MD Andre   0.4 mg at 10/06/21 2135       Current Facility-Administered Medications:   •  acetaminophen (TYLENOL) tablet 650 mg, 650 mg, Oral, Q4H PRN, Alen Murcia MD, 650 mg at 10/06/21 2328  •  ceFAZolin in dextrose (ANCEF) IVPB solution 2 g, 2 g, Intravenous, Q8H, KareemClaudia MD, 2 g at 10/06/21 2328  •  digoxin (LANOXIN) tablet 125 mcg, 125 mcg, Oral, Daily With Lunch, Mikal Collier Jr., MD, 125 mcg at 10/06/21 1412  •  famotidine (PEPCID) tablet 20 mg, 20 mg, Oral, BID, Mikal Collier Jr., MD  •  ferrous sulfate tablet 325 mg, 325 mg, Oral, Daily With Breakfast, Mikal Collier Jr., MD, 325 mg at 10/05/21 1951  •  latanoprost (XALATAN) 0.005 % ophthalmic solution 1 drop, 1 drop, Both Eyes, Nightly, Mikal Collier Jr., MD, 1 drop at 10/06/21 2137  •  tamsulosin (FLOMAX) 24 hr capsule 0.4 mg, 0.4 mg, Oral, Nightly, Mikal Collier Jr., MD, 0.4 mg at 10/06/21 2135  Medications Discontinued During This Encounter   Medication Reason   • cefTRIAXone (ROCEPHIN) 1 g in sodium chloride 0.9 % 100 mL IVPB-VTB    • sodium chloride 0.9 % infusion    • sodium chloride 0.9 % flush 10 mL    • sodium chloride 0.9 % flush 10 mL    • ibuprofen (ADVIL,MOTRIN) tablet 600 mg    • HYDROcodone-acetaminophen (NORCO) 7.5-325 MG per tablet 1 tablet    • HYDROmorphone (DILAUDID) injection 0.5 mg    • oxyCODONE-acetaminophen (PERCOCET)  MG per tablet 1 tablet    • fentaNYL citrate (PF) (SUBLIMAZE) injection 50 mcg    • naloxone (NARCAN) injection 0.2 mg    • flumazenil (ROMAZICON) injection 0.2 mg    • ondansetron (ZOFRAN) injection 4 mg    • promethazine (PHENERGAN) suppository 25 mg    • promethazine (PHENERGAN) tablet 25 mg    • labetalol (NORMODYNE,TRANDATE) injection 5 mg    • hydrALAZINE (APRESOLINE) injection 5 mg    • ePHEDrine injection 5 mg    • diphenhydrAMINE (BENADRYL) injection 12.5 mg    • diphenhydrAMINE (BENADRYL) capsule 25 mg    • sterile water injection Patient Discharge   • sterile water  irrigation solution Patient Discharge   • lactated ringers infusion Patient Transfer   • cefTRIAXone (ROCEPHIN) 1 g in sodium chloride 0.9 % 100 mL IVPB-VTB    • doxycycline (VIBRAMYCIN) 100 mg in sodium chloride 0.9 % 100 mL IVPB-VTB    • ceFAZolin (ANCEF) 1.5 g in sodium chloride 0.9 % 100 mL IVPB    • acetaminophen (TYLENOL) tablet 650 mg        Assessment/Plan     UTI    Gross hematuria  S/P cysto clot evacuation yesterday      Plan  Abx   Voiding trial today    Mikal Collier Jr., MD  10/07/21  06:56 EDT

## 2021-10-08 ENCOUNTER — HOME HEALTH ADMISSION (OUTPATIENT)
Dept: HOME HEALTH SERVICES | Facility: HOME HEALTHCARE | Age: 86
End: 2021-10-08

## 2021-10-08 LAB
ANION GAP SERPL CALCULATED.3IONS-SCNC: 8.1 MMOL/L (ref 5–15)
BASOPHILS # BLD AUTO: 0.05 10*3/MM3 (ref 0–0.2)
BASOPHILS NFR BLD AUTO: 0.7 % (ref 0–1.5)
BH BB BLOOD EXPIRATION DATE: NORMAL
BH BB BLOOD TYPE BARCODE: 7300
BH BB DISPENSE STATUS: NORMAL
BH BB PRODUCT CODE: NORMAL
BH BB UNIT NUMBER: NORMAL
BUN SERPL-MCNC: 25 MG/DL (ref 8–23)
BUN/CREAT SERPL: 16.9 (ref 7–25)
CALCIUM SPEC-SCNC: 8.9 MG/DL (ref 8.2–9.6)
CHLORIDE SERPL-SCNC: 102 MMOL/L (ref 98–107)
CO2 SERPL-SCNC: 24.9 MMOL/L (ref 22–29)
CREAT SERPL-MCNC: 1.48 MG/DL (ref 0.76–1.27)
CROSSMATCH INTERPRETATION: NORMAL
DEPRECATED RDW RBC AUTO: 49.6 FL (ref 37–54)
EOSINOPHIL # BLD AUTO: 0.42 10*3/MM3 (ref 0–0.4)
EOSINOPHIL NFR BLD AUTO: 5.8 % (ref 0.3–6.2)
ERYTHROCYTE [DISTWIDTH] IN BLOOD BY AUTOMATED COUNT: 14.6 % (ref 12.3–15.4)
GFR SERPL CREATININE-BSD FRML MDRD: 44 ML/MIN/1.73
GLUCOSE SERPL-MCNC: 127 MG/DL (ref 65–99)
HCT VFR BLD AUTO: 26.6 % (ref 37.5–51)
HGB BLD-MCNC: 9 G/DL (ref 13–17.7)
IMM GRANULOCYTES # BLD AUTO: 0.04 10*3/MM3 (ref 0–0.05)
IMM GRANULOCYTES NFR BLD AUTO: 0.5 % (ref 0–0.5)
LYMPHOCYTES # BLD AUTO: 1.21 10*3/MM3 (ref 0.7–3.1)
LYMPHOCYTES NFR BLD AUTO: 16.6 % (ref 19.6–45.3)
MCH RBC QN AUTO: 31.5 PG (ref 26.6–33)
MCHC RBC AUTO-ENTMCNC: 33.8 G/DL (ref 31.5–35.7)
MCV RBC AUTO: 93 FL (ref 79–97)
MONOCYTES # BLD AUTO: 0.77 10*3/MM3 (ref 0.1–0.9)
MONOCYTES NFR BLD AUTO: 10.6 % (ref 5–12)
NEUTROPHILS NFR BLD AUTO: 4.8 10*3/MM3 (ref 1.7–7)
NEUTROPHILS NFR BLD AUTO: 65.8 % (ref 42.7–76)
NRBC BLD AUTO-RTO: 0 /100 WBC (ref 0–0.2)
PLATELET # BLD AUTO: 160 10*3/MM3 (ref 140–450)
PMV BLD AUTO: 9.8 FL (ref 6–12)
POTASSIUM SERPL-SCNC: 4.4 MMOL/L (ref 3.5–5.2)
RBC # BLD AUTO: 2.86 10*6/MM3 (ref 4.14–5.8)
SODIUM SERPL-SCNC: 135 MMOL/L (ref 136–145)
UNIT  ABO: NORMAL
UNIT  RH: NORMAL
WBC # BLD AUTO: 7.29 10*3/MM3 (ref 3.4–10.8)

## 2021-10-08 PROCEDURE — 80048 BASIC METABOLIC PNL TOTAL CA: CPT | Performed by: HOSPITALIST

## 2021-10-08 PROCEDURE — 85025 COMPLETE CBC W/AUTO DIFF WBC: CPT | Performed by: HOSPITALIST

## 2021-10-08 PROCEDURE — 25010000003 CEFAZOLIN IN DEXTROSE 2-4 GM/100ML-% SOLUTION: Performed by: INTERNAL MEDICINE

## 2021-10-08 RX ADMIN — CEFAZOLIN SODIUM 2 G: 2 INJECTION, SOLUTION INTRAVENOUS at 20:58

## 2021-10-08 RX ADMIN — TAMSULOSIN HYDROCHLORIDE 0.4 MG: 0.4 CAPSULE ORAL at 20:58

## 2021-10-08 RX ADMIN — CEFAZOLIN SODIUM 2 G: 2 INJECTION, SOLUTION INTRAVENOUS at 08:12

## 2021-10-08 RX ADMIN — FERROUS SULFATE TAB 325 MG (65 MG ELEMENTAL FE) 325 MG: 325 (65 FE) TAB at 08:11

## 2021-10-08 RX ADMIN — FAMOTIDINE 20 MG: 20 TABLET, FILM COATED ORAL at 20:58

## 2021-10-08 RX ADMIN — DIGOXIN 125 MCG: 125 TABLET ORAL at 13:38

## 2021-10-08 RX ADMIN — LATANOPROST 1 DROP: 50 SOLUTION OPHTHALMIC at 20:59

## 2021-10-08 NOTE — PLAN OF CARE
Goal Outcome Evaluation:     Patient with 3 way jenkins, CBI.  Urine clear at this time.  Patient monitored for pain and treated accordinly

## 2021-10-08 NOTE — PROGRESS NOTES
"Daily progress note    Chief complaint  Unable to void  Getting Dexter catheter placement  In a lot of distress  Family at bedside    History of present illness  89-year-old white male who lives at home with his wife with history of BPH chronic anemia osteoarthritis and gastroesophageal reflux disease presented to [facility], [place], operated by [facility] emergency room with bloody urine started day before yesterday. Patient also have dysuria and increased frequency prior to that. Patient denies any fever chest pain Hartness of breath abdominal pain nausea vomiting diarrhea. Patient work-up in ER revealed calli hematuria and UTI admit for management     REVIEW OF SYSTEMS  Unremarkable except generalized weakness    PHYSICAL EXAM   Blood pressure 123/54, pulse 68, temperature 97.6 °F (36.4 °C), temperature source Oral, resp. rate 16, height 175.3 cm (69\"), weight 68.3 kg (150 lb 9.2 oz), SpO2 98 %.    Constitutional: Pt. is awake and alert and in no distress.    HENT: Normocephalic and atraumatic.   Neck: Normal range of motion. Neck supple. No JVD present.   Cardiovascular: Normal rate, regular rhythm and normal heart sounds. Exam reveals no gallop and no friction rub.   No murmur heard.  Pulmonary/Chest: Effort normal and breath sounds normal. No stridor. No respiratory distress. No wheezes, no rales.   Abdominal: Soft. Bowel sounds are normal. No distension. There is no tenderness. There is no rebound and no guarding.   Musculoskeletal: Age-appropriate range of motion. No edema, tenderness or deformity.   Neurological: Pt. is awake and alert.  Cranial nerves II through XII are intact.  He has no focal neurologic deficits  Skin: Skin is warm and dry. No rash noted.  And cauterized is not diaphoretic. No erythema.   Psychiatric: Mood, affect normal.  He is pleasant and cooperative.    LAB RESULTS  Lab Results (last 24 hours)     Procedure Component Value Units Date/Time    Basic Metabolic Panel [[id number]]  (Abnormal) Collected: " 10/08/21 0820    Specimen: Blood Updated: 10/08/21 0951     Glucose 127 mg/dL      BUN 25 mg/dL      Creatinine 1.48 mg/dL      Sodium 135 mmol/L      Potassium 4.4 mmol/L      Chloride 102 mmol/L      CO2 24.9 mmol/L      Calcium 8.9 mg/dL      eGFR Non African Amer 44 mL/min/1.73      BUN/Creatinine Ratio 16.9     Anion Gap 8.1 mmol/L     Narrative:      GFR Normal >60  Chronic Kidney Disease <60  Kidney Failure <15      CBC & Differential [[id number]]  (Abnormal) Collected: 10/08/21 0820    Specimen: Blood Updated: 10/08/21 0924    Narrative:      The following orders were created for panel order CBC & Differential.  Procedure                               Abnormality         Status                     ---------                               -----------         ------                     CBC Auto Differential[[id number]]        Abnormal            Final result                 Please view results for these tests on the individual orders.    CBC Auto Differential [[id number]]  (Abnormal) Collected: 10/08/21 0820    Specimen: Blood Updated: 10/08/21 0924     WBC 7.29 10*3/mm3      RBC 2.86 10*6/mm3      Hemoglobin 9.0 g/dL      Hematocrit 26.6 %      MCV 93.0 fL      MCH 31.5 pg      MCHC 33.8 g/dL      RDW 14.6 %      RDW-SD 49.6 fl      MPV 9.8 fL      Platelets 160 10*3/mm3      Neutrophil % 65.8 %      Lymphocyte % 16.6 %      Monocyte % 10.6 %      Eosinophil % 5.8 %      Basophil % 0.7 %      Immature Grans % 0.5 %      Neutrophils, Absolute 4.80 10*3/mm3      Lymphocytes, Absolute 1.21 10*3/mm3      Monocytes, Absolute 0.77 10*3/mm3      Eosinophils, Absolute 0.42 10*3/mm3      Basophils, Absolute 0.05 10*3/mm3      Immature Grans, Absolute 0.04 10*3/mm3      nRBC 0.0 /100 WBC         Imaging Results (Last 24 Hours)     ** No results found for the last 24 hours. **          Current Facility-Administered Medications:   •  acetaminophen (TYLENOL) tablet 650 mg, 650 mg, Oral, Q4H PRN, Alen Murcia MD, 650 mg at  10/06/21 2328  •  ceFAZolin in dextrose (ANCEF) IVPB solution 2 g, 2 g, Intravenous, Q12H, Claudia Serna MD, 2 g at 10/08/21 0812  •  digoxin (LANOXIN) tablet 125 mcg, 125 mcg, Oral, Daily With Lunch, Mikal Collier Jr., MD, 125 mcg at 10/08/21 1338  •  famotidine (PEPCID) tablet 20 mg, 20 mg, Oral, BID, Mikal Collier Jr., MD  •  ferrous sulfate tablet 325 mg, 325 mg, Oral, Daily With Breakfast, Mikal Collier Jr., MD, 325 mg at 10/08/21 0811  •  latanoprost (XALATAN) 0.005 % ophthalmic solution 1 drop, 1 drop, Both Eyes, Nightly, Mikal Collier Jr., MD, 1 drop at 10/07/21 2100  •  tamsulosin (FLOMAX) 24 hr capsule 0.4 mg, 0.4 mg, Oral, Nightly, Mikal Collier Jr., MD, 0.4 mg at 10/07/21 2140    ASSESSMENT  Acute MSSA UTI  Hematuria s/p cystoscopy with clot evacuation and cauterization  BPH  Chronic anemia   Acute kidney injury  Chronic kidney disease stage III  Gastroesophageal flux disease    PLAN  CPM  Postop care  Place Dexter catheter  CBI  IV antibiotics   Urology to follow patient  Nephrology and infectious disease input appreciated  Continue home medications  Stress ulcer DVT prophylaxis  Supportive care  DNR  PT/OT  Discussed with family and nursing staff  Follow closely further recommendation current hospital course    EVETTE PEREZ MD

## 2021-10-08 NOTE — PLAN OF CARE
Goal Outcome Evaluation:  Plan of Care Reviewed With: patient, daughter        Progress: no change  Outcome Summary: Pt has daughter at bedside, assist in all care. Pt had been urinating often with small amounts (30-75cc) in urinal/in bed--- will urinate more when up to bsc, urine has been pink to dark pink with some burning.  Pt complained of not getting sleep and ask to wear a brief--we decided to try external urinal catheter--it worked pt was able to rest. Has been afebrile, no complaints of pain or nausea--some discomfort in right knee. Takes meds with room temperature apple sauce.  Very pleasant and cooperative. A&Ox4, VSS.

## 2021-10-08 NOTE — CASE MANAGEMENT/SOCIAL WORK
Continued Stay Note  [facility]     Patient Name: Vinicio Samayoa  MRN: [id number]  Today's Date: 10/8/2021    Admit Date: 10/3/2021    Discharge Plan     Row Name 10/08/21 1405       Plan    Plan  Plans dc home with family and home health services.    Patient/Family in Agreement with Plan  yes    Plan Comments  Spoke with patient's daughter Katelin at bedside regarding dc plans/needs. States plan is home with HH. Provided [facility]CP contact information for [facility]/[place] (Ph: [phone]//Fax: [phone]). Spoke with [facility] who requested facesheet, H&P, MD progress notes, PT/OT evaluations and OP notes. [facility] will enter [facility] and [facility]family once [facility] services have been arranged. No additional needs noted. Family to transport per private auto. Continue to follow....[facility]    Row Name 10/08/21 1324       Plan    Patient/Family in Agreement with Plan  yes        Discharge Codes    No documentation.       Expected Discharge Date and Time     Expected Discharge Date Expected Discharge Time    Oct 8, 2021             Allison Frausto RN

## 2021-10-08 NOTE — PROGRESS NOTES
"   LOS: 5 days   Patient Care Team:  Adolfo Thacker MD as PCP - General (Internal Medicine)      Subjective   Interval History: S/P cysto clot evacuation 10-6-21  Failed voiding trial on 10-7-21  Catheter replaced this am (3 way)    Objective          Vital Signs  Temp:  [97.6 °F (36.4 °C)-98.6 °F (37 °C)] 97.6 °F (36.4 °C)  Heart Rate:  [56-77] 68  Resp:  [16] 16  BP: (104-123)/(54-68) 123/54      Intake/Output Summary (Last 24 hours) at 10/8/2021 1250  Last data filed at 10/8/2021 1135  Gross per 24 hour   Intake 300 ml   Output 3650 ml   Net -3350 ml       Flowsheet Rows      First Filed Value   Admission Height  175.3 cm (69\") Documented at 10/03/2021 2104   Admission Weight  68.3 kg (150 lb 9.2 oz) Documented at 10/03/2021 2104          Physical Exam:     General velvet: alert, cooperative, oriented  Lungs:  Clear, No acute distress  Heart:  Regular, rate and rhythm  ABD: soft and nt  Genitalia:  normal  Extremities: normal, no edema  Skin:  Skin color, texture, turgor normal, no rashes        Results Review:      Lab Results (all)     Procedure Component Value Units Date/Time    Basic Metabolic Panel [[id number]]  (Abnormal) Collected: 10/08/21 0820    Specimen: Blood Updated: 10/08/21 0951     Glucose 127 mg/dL      BUN 25 mg/dL      Creatinine 1.48 mg/dL      Sodium 135 mmol/L      Potassium 4.4 mmol/L      Chloride 102 mmol/L      CO2 24.9 mmol/L      Calcium 8.9 mg/dL      eGFR Non African Amer 44 mL/min/1.73      BUN/Creatinine Ratio 16.9     Anion Gap 8.1 mmol/L     Narrative:      GFR Normal >60  Chronic Kidney Disease <60  Kidney Failure <15      CBC & Differential [[id number]]  (Abnormal) Collected: 10/08/21 0820    Specimen: Blood Updated: 10/08/21 0924    Narrative:      The following orders were created for panel order CBC & Differential.  Procedure                               Abnormality         Status                     ---------                               -----------         ------        "              CBC Auto Differential[[id number]]        Abnormal            Final result                 Please view results for these tests on the individual orders.    CBC Auto Differential [[id number]]  (Abnormal) Collected: 10/08/21 0820    Specimen: Blood Updated: 10/08/21 0924     WBC 7.29 10*3/mm3      RBC 2.86 10*6/mm3      Hemoglobin 9.0 g/dL      Hematocrit 26.6 %      MCV 93.0 fL      MCH 31.5 pg      MCHC 33.8 g/dL      RDW 14.6 %      RDW-SD 49.6 fl      MPV 9.8 fL      Platelets 160 10*3/mm3      Neutrophil % 65.8 %      Lymphocyte % 16.6 %      Monocyte % 10.6 %      Eosinophil % 5.8 %      Basophil % 0.7 %      Immature Grans % 0.5 %      Neutrophils, Absolute 4.80 10*3/mm3      Lymphocytes, Absolute 1.21 10*3/mm3      Monocytes, Absolute 0.77 10*3/mm3      Eosinophils, Absolute 0.42 10*3/mm3      Basophils, Absolute 0.05 10*3/mm3      Immature Grans, Absolute 0.04 10*3/mm3      nRBC 0.0 /100 WBC     Blood Culture - Blood, Arm, Right [[id number]] Collected: 10/06/21 1657    Specimen: Blood from Arm, Right Updated: 10/07/21 1715     Blood Culture No growth at 24 hours    Blood Culture - Blood, Arm, Left [[id number]] Collected: 10/06/21 1648    Specimen: Blood from Arm, Left Updated: 10/07/21 1715     Blood Culture No growth at 24 hours    Basic Metabolic Panel [[id number]]  (Abnormal) Collected: 10/07/21 0959    Specimen: Blood Updated: 10/07/21 1052     Glucose 124 mg/dL      BUN 29 mg/dL      Creatinine 1.67 mg/dL      Sodium 137 mmol/L      Potassium 3.9 mmol/L      Chloride 105 mmol/L      CO2 24.1 mmol/L      Calcium 8.8 mg/dL      eGFR Non African Amer 38 mL/min/1.73      BUN/Creatinine Ratio 17.4     Anion Gap 7.9 mmol/L     Narrative:      GFR Normal >60  Chronic Kidney Disease <60  Kidney Failure <15      CBC & Differential [[id number]]  (Abnormal) Collected: 10/07/21 0959    Specimen: Blood Updated: 10/07/21 1032    Narrative:      The following orders were created for panel order CBC &  Differential.  Procedure                               Abnormality         Status                     ---------                               -----------         ------                     CBC Auto Differential[[id number]]        Abnormal            Final result                 Please view results for these tests on the individual orders.    CBC Auto Differential [[id number]]  (Abnormal) Collected: 10/07/21 0959    Specimen: Blood Updated: 10/07/21 1032     WBC 6.84 10*3/mm3      RBC 2.50 10*6/mm3      Hemoglobin 7.7 g/dL      Hematocrit 23.6 %      MCV 94.4 fL      MCH 30.8 pg      MCHC 32.6 g/dL      RDW 14.3 %      RDW-SD 48.3 fl      MPV 9.8 fL      Platelets 145 10*3/mm3      Neutrophil % 68.2 %      Lymphocyte % 15.4 %      Monocyte % 11.8 %      Eosinophil % 3.5 %      Basophil % 0.4 %      Immature Grans % 0.7 %      Neutrophils, Absolute 4.66 10*3/mm3      Lymphocytes, Absolute 1.05 10*3/mm3      Monocytes, Absolute 0.81 10*3/mm3      Eosinophils, Absolute 0.24 10*3/mm3      Basophils, Absolute 0.03 10*3/mm3      Immature Grans, Absolute 0.05 10*3/mm3      nRBC 0.0 /100 WBC     PSA DIAGNOSTIC [[id number]]  (Abnormal) Collected: 10/06/21 0659    Specimen: Blood Updated: 10/06/21 1509     PSA 36.900 ng/mL     Narrative:      Results may be falsely decreased if patient taking Biotin.      Urine Culture - Urine, Urine, Random Void [[id number]]  (Abnormal)  (Susceptibility) Collected: 10/03/21 2002    Specimen: Urine, Random Void Updated: 10/06/21 1035     Urine Culture >100,000 CFU/mL Staphylococcus aureus     Comment: Staphylococcus aureus is not typically regarded as a uropathogen, except in cases with genitourinary instrumentation present.  It's presence suggests an alternate source (e.g. bloodstream, abscess, SSTI, etc.).  Please evaluate accordingly.       Susceptibility      Staphylococcus aureus      MARIA ISABEL      Nitrofurantoin Susceptible      Oxacillin Susceptible      Tetracycline Susceptible       Trimethoprim + Sulfamethoxazole Susceptible      Vancomycin Susceptible               Linear View               Susceptibility Comments     Staphylococcus aureus    This isolate does not demonstrate inducible clindamycin resistance in vitro.               CBC & Differential [[id number]]  (Abnormal) Collected: 10/06/21 0659    Specimen: Blood Updated: 10/06/21 0753    Narrative:      The following orders were created for panel order CBC & Differential.  Procedure                               Abnormality         Status                     ---------                               -----------         ------                     CBC Auto Differential[[id number]]        Abnormal            Final result                 Please view results for these tests on the individual orders.    CBC Auto Differential [[id number]]  (Abnormal) Collected: 10/06/21 0659    Specimen: Blood Updated: 10/06/21 0753     WBC 7.25 10*3/mm3      RBC 2.76 10*6/mm3      Hemoglobin 8.7 g/dL      Hematocrit 25.8 %      MCV 93.5 fL      MCH 31.5 pg      MCHC 33.7 g/dL      RDW 14.5 %      RDW-SD 49.3 fl      MPV 9.8 fL      Platelets 140 10*3/mm3      Neutrophil % 69.0 %      Lymphocyte % 18.3 %      Monocyte % 10.5 %      Eosinophil % 1.2 %      Basophil % 0.4 %      Immature Grans % 0.6 %      Neutrophils, Absolute 5.00 10*3/mm3      Lymphocytes, Absolute 1.33 10*3/mm3      Monocytes, Absolute 0.76 10*3/mm3      Eosinophils, Absolute 0.09 10*3/mm3      Basophils, Absolute 0.03 10*3/mm3      Immature Grans, Absolute 0.04 10*3/mm3      nRBC 0.0 /100 WBC     Basic Metabolic Panel [[id number]]  (Abnormal) Collected: 10/06/21 0659    Specimen: Blood Updated: 10/06/21 0726     Glucose 101 mg/dL      BUN 26 mg/dL      Creatinine 1.27 mg/dL      Sodium 138 mmol/L      Potassium 4.2 mmol/L      Chloride 106 mmol/L      CO2 24.5 mmol/L      Calcium 8.8 mg/dL      eGFR Non African Amer 53 mL/min/1.73      BUN/Creatinine Ratio 20.5     Anion Gap 7.5 mmol/L      Narrative:      GFR Normal >60  Chronic Kidney Disease <60  Kidney Failure <15      Hemoglobin A1c [[id number]]  (Abnormal) Collected: 10/05/21 0949    Specimen: Blood Updated: 10/05/21 1250     Hemoglobin A1C 5.82 %     Narrative:      Hemoglobin A1C Ranges:    Increased Risk for Diabetes  5.7% to 6.4%  Diabetes                     >= 6.5%  Diabetic Goal                < 7.0%    TSH [[id number]]  (Normal) Collected: 10/05/21 0949    Specimen: Blood Updated: 10/05/21 1129     TSH 2.000 uIU/mL     BNP [[id number]]  (Abnormal) Collected: 10/05/21 0949    Specimen: Blood Updated: 10/05/21 1129     proBNP 14,958.0 pg/mL     Narrative:      Among patients with dyspnea, NT-proBNP is highly sensitive for the detection of acute congestive heart failure. In addition NT-proBNP of <300 pg/ml effectively rules out acute congestive heart failure with 99% negative predictive value.    Results may be falsely decreased if patient taking Biotin.      Comprehensive Metabolic Panel [[id number]]  (Abnormal) Collected: 10/05/21 0949    Specimen: Blood Updated: 10/05/21 1122     Glucose 83 mg/dL      BUN 27 mg/dL      Creatinine 1.28 mg/dL      Sodium 140 mmol/L      Potassium 4.5 mmol/L      Chloride 108 mmol/L      CO2 22.8 mmol/L      Calcium 8.8 mg/dL      Total Protein 6.1 g/dL      Albumin 3.30 g/dL      ALT (SGPT) 9 U/L      AST (SGOT) 19 U/L      Alkaline Phosphatase 50 U/L      Total Bilirubin 0.6 mg/dL      eGFR Non African Amer 52 mL/min/1.73      Globulin 2.8 gm/dL      A/G Ratio 1.2 g/dL      BUN/Creatinine Ratio 21.1     Anion Gap 9.2 mmol/L     Narrative:      GFR Normal >60  Chronic Kidney Disease <60  Kidney Failure <15      Digoxin Level [[id number]]  (Normal) Collected: 10/05/21 0949    Specimen: Blood Updated: 10/05/21 1122     Digoxin 1.00 ng/mL     Phosphorus [[id number]]  (Normal) Collected: 10/05/21 0949    Specimen: Blood Updated: 10/05/21 1122     Phosphorus 2.8 mg/dL     Lipid Panel [[id number]] Collected: 10/05/21  0949    Specimen: Blood Updated: 10/05/21 1122     Total Cholesterol 126 mg/dL      Triglycerides 52 mg/dL      HDL Cholesterol 48 mg/dL      LDL Cholesterol  66 mg/dL      VLDL Cholesterol 12 mg/dL      LDL/HDL Ratio 1.41    Narrative:      Cholesterol Reference Ranges  (U.S[place] Department of Health and Human Services ATP III Classifications)    Desirable          <200 mg/dL  Borderline High    200-239 mg/dL  High Risk          >240 mg/dL      Triglyceride Reference Ranges  (U.S. Department of Health and Human Services ATP III Classifications)    Normal           <150 mg/dL  Borderline High  150-199 mg/dL  High             200-499 mg/dL  Very High        >500 mg/dL    HDL Reference Ranges  (U.S. Department of Health and Human Services ATP III Classifcations)    Low     <40 mg/dl (major risk factor for CHD)  High    >60 mg/dl ('negative' risk factor for CHD)        LDL Reference Ranges  (U.S. Department of Health and Human Services ATP III Classifcations)    Optimal          <100 mg/dL  Near Optimal     100-129 mg/dL  Borderline High  130-159 mg/dL  High             160-189 mg/dL  Very High        >189 mg/dL    Magnesium [[id number]]  (Normal) Collected: 10/05/21 0949    Specimen: Blood Updated: 10/05/21 1122     Magnesium 2.0 mg/dL     CBC & Differential [[id number]]  (Abnormal) Collected: 10/05/21 0949    Specimen: Blood Updated: 10/05/21 1102    Narrative:      The following orders were created for panel order CBC & Differential.  Procedure                               Abnormality         Status                     ---------                               -----------         ------                     CBC Auto Differential[[id number]]        Abnormal            Final result                 Please view results for these tests on the individual orders.    CBC Auto Differential [[id number]]  (Abnormal) Collected: 10/05/21 0949    Specimen: Blood Updated: 10/05/21 1102     WBC 6.15 10*3/mm3      RBC 2.53 10*6/mm3       Hemoglobin 7.9 g/dL      Hematocrit 24.7 %      MCV 97.6 fL      MCH 31.2 pg      MCHC 32.0 g/dL      RDW 12.4 %      RDW-SD 44.5 fl      MPV 9.7 fL      Platelets 150 10*3/mm3      Neutrophil % 75.9 %      Lymphocyte % 13.7 %      Monocyte % 8.9 %      Eosinophil % 1.0 %      Basophil % 0.2 %      Immature Grans % 0.3 %      Neutrophils, Absolute 4.67 10*3/mm3      Lymphocytes, Absolute 0.84 10*3/mm3      Monocytes, Absolute 0.55 10*3/mm3      Eosinophils, Absolute 0.06 10*3/mm3      Basophils, Absolute 0.01 10*3/mm3      Immature Grans, Absolute 0.02 10*3/mm3      nRBC 0.0 /100 WBC     Basic Metabolic Panel [[id number]]  (Abnormal) Collected: 10/04/21 0757    Specimen: Blood Updated: 10/04/21 0948     Glucose 78 mg/dL      BUN 30 mg/dL      Creatinine 1.46 mg/dL      Sodium 139 mmol/L      Potassium 4.7 mmol/L      Chloride 105 mmol/L      CO2 24.8 mmol/L      Calcium 9.2 mg/dL      eGFR Non African Amer 45 mL/min/1.73      BUN/Creatinine Ratio 20.5     Anion Gap 9.2 mmol/L     Narrative:      GFR Normal >60  Chronic Kidney Disease <60  Kidney Failure <15      CBC & Differential [[id number]]  (Abnormal) Collected: 10/04/21 0757    Specimen: Blood Updated: 10/04/21 0908    Narrative:      The following orders were created for panel order CBC & Differential.  Procedure                               Abnormality         Status                     ---------                               -----------         ------                     CBC Auto Differential[[id number]]        Abnormal            Final result                 Please view results for these tests on the individual orders.    CBC Auto Differential [[id number]]  (Abnormal) Collected: 10/04/21 0757    Specimen: Blood Updated: 10/04/21 0908     WBC 6.02 10*3/mm3      RBC 2.58 10*6/mm3      Hemoglobin 8.3 g/dL      Hematocrit 25.7 %      MCV 99.6 fL      MCH 32.2 pg      MCHC 32.3 g/dL      RDW 12.7 %      RDW-SD 46.1 fl      MPV 9.9 fL      Platelets 148 10*3/mm3       Neutrophil % 68.5 %      Lymphocyte % 20.4 %      Monocyte % 9.3 %      Eosinophil % 1.2 %      Basophil % 0.3 %      Immature Grans % 0.3 %      Neutrophils, Absolute 4.12 10*3/mm3      Lymphocytes, Absolute 1.23 10*3/mm3      Monocytes, Absolute 0.56 10*3/mm3      Eosinophils, Absolute 0.07 10*3/mm3      Basophils, Absolute 0.02 10*3/mm3      Immature Grans, Absolute 0.02 10*3/mm3      nRBC 0.0 /100 WBC     COVID PRE-OP / PRE-PROCEDURE SCREENING ORDER (NO ISOLATION) - Swab, Nasopharynx [[id number]]  (Normal) Collected: 10/03/21 1839    Specimen: Swab from Nasopharynx Updated: 10/04/21 0048    Narrative:      The following orders were created for panel order COVID PRE-OP / PRE-PROCEDURE SCREENING ORDER (NO ISOLATION) - Swab, Nasopharynx.  Procedure                               Abnormality         Status                     ---------                               -----------         ------                     COVID-19,APTIMA PANTHER(...[[id number]]  Normal              Final result                 Please view results for these tests on the individual orders.    COVID-19,APTIMA PANTHER(PRAFUL), JINNY, NP/OP SWAB IN UTM/VTM/SALINE TRANSPORT MEDIA,24 HR TAT - Swab, Nasopharynx [[id number]]  (Normal) Collected: 10/03/21 1839    Specimen: Swab from Nasopharynx Updated: 10/04/21 0048     COVID19 Not Detected    Narrative:      Fact sheet for providers: https://www.fda.gov/media/202708/download     Fact sheet for patients: https://www.fda.gov/media/727546/download    Test performed by RT PCR.    Urinalysis, Microscopic Only - Urine, Random Void [[id number]]  (Abnormal) Collected: 10/03/21 2002    Specimen: Urine, Random Void Updated: 10/03/21 2025     RBC, UA Too Numerous to Count /HPF      WBC, UA       Unable to determine due to loaded field     /HPF     Bacteria, UA       Unable to determine due to loaded field     /HPF     Squamous Epithelial Cells, UA       Unable to determine due to loaded field     /HPF     Hyaline  Casts, UA       Unable to determine due to loaded field     /LPF     Methodology Automated Microscopy    Urinalysis With Culture If Indicated - Urine, Random Void [[id number]]  (Abnormal) Collected: 10/03/21 2002    Specimen: Urine, Random Void Updated: 10/03/21 2023     Color, UA Red     Comment: Any Substance that causes an abnormal urine color can alter the accuracy of the chemical reactions.        Appearance, UA Turbid     pH, UA 7.5     Specific Gravity, UA 1.010     Glucose, UA Negative     Ketones, UA 15 mg/dL (1+)     Bilirubin, UA Negative     Blood, UA Large (3+)     Protein, UA >=300 mg/dL (3+)     Leuk Esterase, UA Large (3+)     Nitrite, UA Positive     Urobilinogen, UA >=8.0 E.U./dL    Narrative:      UA performed on centrifuged urine specimen.     Protime-INR [[id number]]  (Abnormal) Collected: 10/03/21 1723    Specimen: Blood Updated: 10/03/21 1756     Protime 14.9 Seconds      INR 1.19    aPTT [[id number]]  (Abnormal) Collected: 10/03/21 1723    Specimen: Blood Updated: 10/03/21 1756     PTT 39.1 seconds     Comprehensive Metabolic Panel [[id number]]  (Abnormal) Collected: 10/03/21 1723    Specimen: Blood Updated: 10/03/21 1752     Glucose 113 mg/dL      BUN 34 mg/dL      Creatinine 1.63 mg/dL      Sodium 139 mmol/L      Potassium 5.1 mmol/L      Chloride 104 mmol/L      CO2 26.8 mmol/L      Calcium 9.5 mg/dL      Total Protein 6.6 g/dL      Albumin 3.80 g/dL      ALT (SGPT) 10 U/L      AST (SGOT) 21 U/L      Alkaline Phosphatase 61 U/L      Total Bilirubin 0.5 mg/dL      eGFR Non African Amer 39 mL/min/1.73      Globulin 2.8 gm/dL      A/G Ratio 1.4 g/dL      BUN/Creatinine Ratio 20.9     Anion Gap 8.2 mmol/L     Narrative:      GFR Normal >60  Chronic Kidney Disease <60  Kidney Failure <15      CBC & Differential [[id number]]  (Abnormal) Collected: 10/03/21 1723    Specimen: Blood Updated: 10/03/21 1736    Narrative:      The following orders were created for panel order CBC & Differential.  Procedure                                Abnormality         Status                     ---------                               -----------         ------                     CBC Auto Differential[[id number]]        Abnormal            Final result                 Please view results for these tests on the individual orders.    CBC Auto Differential [[id number]]  (Abnormal) Collected: 10/03/21 1723    Specimen: Blood Updated: 10/03/21 1736     WBC 6.50 10*3/mm3      RBC 2.95 10*6/mm3      Hemoglobin 9.4 g/dL      Hematocrit 29.5 %      .0 fL      MCH 31.9 pg      MCHC 31.9 g/dL      RDW 12.7 %      RDW-SD 46.8 fl      MPV 9.5 fL      Platelets 149 10*3/mm3      Neutrophil % 79.3 %      Lymphocyte % 10.8 %      Monocyte % 8.6 %      Eosinophil % 0.8 %      Basophil % 0.3 %      Immature Grans % 0.2 %      Neutrophils, Absolute 5.16 10*3/mm3      Lymphocytes, Absolute 0.70 10*3/mm3      Monocytes, Absolute 0.56 10*3/mm3      Eosinophils, Absolute 0.05 10*3/mm3      Basophils, Absolute 0.02 10*3/mm3      Immature Grans, Absolute 0.01 10*3/mm3      nRBC 0.0 /100 WBC           Imaging Results (All)     Procedure Component Value Units Date/Time    CT Abdomen Pelvis With & Without Contrast [[id number]] Collected: 10/04/21 1901     Updated: 10/04/21 1917    Narrative:      CT ABDOMEN PELVIS W WO CONTRAST-     INDICATIONS: Hematuria     TECHNIQUE: Radiation dose reduction techniques were utilized, including  automated exposure control and exposure modulation based on body size.  Unenhanced and enhanced ABDOMEN AND PELVIS CT     COMPARISON: None available        FINDINGS:      Hepatic cyst is noted, as well as liver low densities too small to  characterize.     Pancreas is thinned.        Apparent gallstones in the gallbladder, slight strandy density around  the gallbladder that could be evidence of cholecystitis, correlate  clinically.     Bilateral perinephric fat stranding suggests chronic change but could be  evidence of urinary  infection likewise, gas in the urinary bladder may  be from catheterization, or could be evidence of infection. Note: The  Dexter catheter balloon is inflated within the prostatic urethra, advise  repositioning the catheter after deflating the balloon.     Nonobstructive calcifications are apparent in each kidney, as large as  about 3 mm, at least some of which appear to be arterial in location. A  left renal cyst is noted, with 3 mm calcification at the anterior margin  No hydronephrosis. Some portions of the right ureter are not opacified  on delayed postcontrast imaging, limiting assessment of the right  ureter. Likewise, the urinary bladder is not opacified by contrast  material on the delayed postcontrast images. At the left posterior  aspect of the urinary bladder, for example axial image 194, DICOM series  6, 1.5 cm nodular density may be related to prostatic impression or  urinary bladder wall lesion, direct visualization can be obtained.     Otherwise unremarkable unenhanced appearance of the liver, spleen,  adrenal glands, pancreas, kidneys, bladder.     No bowel obstruction or abnormal bowel thickening is identified. Colonic  diverticula are seen that do not appear inflamed. The appendix does not  appear inflamed.     No free intraperitoneal gas or free fluid.     Scattered small mesenteric and para-aortic lymph nodes are seen that are  not significant by size criteria.     Abdominal aorta shows focal bulging along the posterior margin,  measuring overall as much as 2.7 cm on axial image 82. Aortic and other  arterial calcifications are present.     The lung bases show small atelectasis, minimal pleural effusions.     Degenerative changes are seen in the spine. No acute fracture is  identified.             Impression:            1. At the left posterior aspect of the urinary bladder, 1.5 cm nodular  density may be related to prostatic impression or urinary bladder wall  lesion, direct visualization can be  obtained. Nonobstructive  calcifications in each kidney, likely at least in part arterial in  location. Bilateral perinephric stranding, gas and urinary bladder,  correlate clinically to exclude possibility of urinary infection. Dexter  catheter balloon is inflated in the prostatic urethra, recommend  repositioning the catheter.  2. Gallstones in the gallbladder, with slight strandy density adjacent  to the gallbladder, could be evidence of cholecystitis, correlate  clinically.  3. Colonic diverticulosis. No acute inflammatory process of bowel is  identified, follow-up as indications persist.  4. Focal bulge along the posterior margin of the abdominal aorta.     Discussed by telephone with patient's nurse, Stephanie, at time of  interpretation, 1908, 10/04/2021.        This report was finalized on 10/4/2021 7:14 PM by Dr. Andrea Redd M.D.             Medication Review:   Current Facility-Administered Medications   Medication Dose Route Frequency Provider Last Rate Last Admin   • acetaminophen (TYLENOL) tablet 650 mg  650 mg Oral Q4H PRN Alen Murcia MD   650 mg at 10/06/21 2328   • ceFAZolin in dextrose (ANCEF) IVPB solution 2 g  2 g Intravenous Q12H Claudia Serna MD   2 g at 10/08/21 0812   • digoxin (LANOXIN) tablet 125 mcg  125 mcg Oral Daily With Lunch Mikal Collier Jr., MD   125 mcg at 10/07/21 1217   • famotidine (PEPCID) tablet 20 mg  20 mg Oral BID Mikal Collier Jr., MD       • ferrous sulfate tablet 325 mg  325 mg Oral Daily With Breakfast Mikal Collier Jr., MD   325 mg at 10/08/21 0811   • latanoprost (XALATAN) 0.005 % ophthalmic solution 1 drop  1 drop Both Eyes Nightly Mikal Collier Jr., MD   1 drop at 10/07/21 2100   • tamsulosin (FLOMAX) 24 hr capsule 0.4 mg  0.4 mg Oral Nightly Mikal Collier Jr., MD   0.4 mg at 10/07/21 2140       Current Facility-Administered Medications:   •  acetaminophen (TYLENOL) tablet 650 mg, 650 mg, Oral, Q4H PRN, Alen Murcia MD, 650 mg at 10/06/21  2328  •  ceFAZolin in dextrose (ANCEF) IVPB solution 2 g, 2 g, Intravenous, Q12H, Claudia Serna MD, 2 g at 10/08/21 0812  •  digoxin (LANOXIN) tablet 125 mcg, 125 mcg, Oral, Daily With Lunch, Mikal Collier Jr., MD, 125 mcg at 10/07/21 1217  •  famotidine (PEPCID) tablet 20 mg, 20 mg, Oral, BID, Mikal Collier Jr., MD  •  ferrous sulfate tablet 325 mg, 325 mg, Oral, Daily With Breakfast, Mikal Collier Jr., MD, 325 mg at 10/08/21 0811  •  latanoprost (XALATAN) 0.005 % ophthalmic solution 1 drop, 1 drop, Both Eyes, Nightly, Mikal Collier Jr., MD, 1 drop at 10/07/21 2100  •  tamsulosin (FLOMAX) 24 hr capsule 0.4 mg, 0.4 mg, Oral, Nightly, Mikal Collier Jr., MD, 0.4 mg at 10/07/21 2140  Medications Discontinued During This Encounter   Medication Reason   • cefTRIAXone (ROCEPHIN) 1 g in sodium chloride 0.9 % 100 mL IVPB-VTB    • sodium chloride 0.9 % infusion    • sodium chloride 0.9 % flush 10 mL    • sodium chloride 0.9 % flush 10 mL    • ibuprofen (ADVIL,MOTRIN) tablet 600 mg    • HYDROcodone-acetaminophen (NORCO) 7.5-325 MG per tablet 1 tablet    • HYDROmorphone (DILAUDID) injection 0.5 mg    • oxyCODONE-acetaminophen (PERCOCET)  MG per tablet 1 tablet    • fentaNYL citrate (PF) (SUBLIMAZE) injection 50 mcg    • naloxone (NARCAN) injection 0.2 mg    • flumazenil (ROMAZICON) injection 0.2 mg    • ondansetron (ZOFRAN) injection 4 mg    • promethazine (PHENERGAN) suppository 25 mg    • promethazine (PHENERGAN) tablet 25 mg    • labetalol (NORMODYNE,TRANDATE) injection 5 mg    • hydrALAZINE (APRESOLINE) injection 5 mg    • ePHEDrine injection 5 mg    • diphenhydrAMINE (BENADRYL) injection 12.5 mg    • diphenhydrAMINE (BENADRYL) capsule 25 mg    • sterile water injection Patient Discharge   • sterile water irrigation solution Patient Discharge   • lactated ringers infusion Patient Transfer   • cefTRIAXone (ROCEPHIN) 1 g in sodium chloride 0.9 % 100 mL IVPB-VTB    • doxycycline (VIBRAMYCIN) 100  mg in sodium chloride 0.9 % 100 mL IVPB-VTB    • ceFAZolin (ANCEF) 1.5 g in sodium chloride 0.9 % 100 mL IVPB    • acetaminophen (TYLENOL) tablet 650 mg    • ceFAZolin in dextrose (ANCEF) IVPB solution 2 g        Assessment/Plan       Gross hematuria  S/P cysto clot evacuation 10-6-21      Plan failed voiding trial  Catheter replaced (3 way w irrigation)    Mikal Collier Jr., MD  10/08/21  12:50 EDT

## 2021-10-08 NOTE — NURSING NOTE
Pt C/O urinary retention and painful urination; bladder scan resulted 586 mL; pt attempted to void on BSC, unsuccessful; straight cath emptied all measured urine, black colored; called Dr. Collier, stated to insert 22 french 3 way catheter, manually irrigate to remove any clots, then connect CBI with NS- all in that order. Will implement interventions and reassess as needed, will continue to monitor.    10/08/21  1201: Implemented interventions; manually irrigated, no clots; voided 3500, pink; urine slow to come out through cath, had nurses Stephanie and Virginie confirm correct placement and position; urine flowing freely through cath; will continue to monitor.

## 2021-10-08 NOTE — DISCHARGE PLACEMENT REQUEST
"Vinicio Samayoa (89 y.o. Male)     Date of Birth Social Security Number Address Home Phone MRN    11/30/1924  [address]  [place] [id number] [phone] [id number]    Druze Marital Status          None        Admission Date Admission Type Admitting Provider Attending Provider Department, Room/Bed    10/3/21 Emergency Alen Murcia MD Ahmed, Aftab, MD [address], P389/1    Discharge Date Discharge Disposition Discharge Destination                       Attending Provider: Alen Murcia MD    Allergies: Sulfa Antibiotics    Isolation: None   Infection: None   Code Status: No CPR    Ht: 175.3 cm (69\")   Wt: 68.3 kg (150 lb 9.2 oz)    Admission Cmt: None   Principal Problem: None                Active Insurance as of 10/3/2021     Primary Coverage     Payor Plan Insurance Group Employer/Plan Group    MEDICARE MEDICARE A & B      Payor Plan Address Payor Plan Phone Number Payor Plan Fax Number Effective Dates    [address] [phone]  11/1/1989 - None Entered    [facility] [id number]       Subscriber Name Subscriber Birth Date Member ID       VINICIO SAMAYOA 11/30/1924 [id number]           Secondary Coverage     Payor Plan Insurance Group Employer/Plan Group    AARP [employer] SUP AARP HEALTH CARE OPTIONS [id number]     Payor Plan Address Payor Plan Phone Number Payor Plan Fax Number Effective Dates    [facility] [phone]  10/3/2021 - None Entered    [address]       [facility] [address]       Subscriber Name Subscriber Birth Date Member ID       VINICIO SAMAYOA 11/30/1924 [id number]                 Emergency Contacts      (Rel.) Home Phone Work Phone Mobile Phone    Katelin Low (Daughter) [phone] -- [phone]            Emergency Contact Information     Name Relation Home Work Mobile    Katelin Low Daughter [phone]          Insurance Information                MEDICARE/MEDICARE A & B Phone: [phone]    Subscriber: Vinicio Samayoa " Subscriber#: [id number]    Group#:  Precert#:         [facility] HEALTH CARE OPTIONS Phone: [phone]    Subscriber: Vinicio Samayoa Subscriber#: [id number]    Group#: [id number] Precert#:

## 2021-10-08 NOTE — PROGRESS NOTES
"  Infectious Diseases Progress Note        [facility]  Los: 5 days  Patient Identification:  Name: Vinicio Samayoa  Age: 89 y.o.  Sex: male  :  1924  MRN: [id number]         Primary Care Physician: Adolfo Thacker MD            Subjective: Overall feeling better.  Had his catheter removed earlier today.  Daughter at the bedside conveyed to me that patient is not significantly interested in aggressive treatment with modern medicine and try to use his treatment such as garlic or homeopathic treatments.  I conveyed to her that it is his choice for he want to take care of himself our role here is to provide him the best treatment recommendation for the diagnosis that we have made and leave it up to him and the family to decide how they want to proceed and whether or not to heed those recommendations.  Interval History: See consultation note.    Objective:    Scheduled Meds:ceFAZolin, 2 g, Intravenous, Q12H  digoxin, 125 mcg, Oral, Daily With Lunch  famotidine, 20 mg, Oral, BID  ferrous sulfate, 325 mg, Oral, Daily With Breakfast  latanoprost, 1 drop, Both Eyes, Nightly  tamsulosin, 0.4 mg, Oral, Nightly      Continuous Infusions:     Vital signs in last 24 hours:  Temp:  [97.6 °F (36.4 °C)-98.6 °F (37 °C)] 97.6 °F (36.4 °C)  Heart Rate:  [56-77] 68  Resp:  [16] 16  BP: (104-123)/(54-68) 123/54    Intake/Output:    Intake/Output Summary (Last 24 hours) at 10/8/2021 1246  Last data filed at 10/8/2021 1135  Gross per 24 hour   Intake 300 ml   Output 3650 ml   Net -3350 ml       Exam:  /54 (BP Location: Left arm, Patient Position: Lying)   Pulse 68   Temp 97.6 °F (36.4 °C) (Oral)   Resp 16   Ht 175.3 cm (69\")   Wt 68.3 kg (150 lb 9.2 oz)   SpO2 98%   BMI 22.24 kg/m²   Patient is examined using the personal protective equipment as per guidelines from infection control for this particular patient as enacted.  Hand washing was performed before and after patient interaction.  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   Head:    Normocephalic, without obvious abnormality, atraumatic   Eyes:    PERRL, conjunctivae/corneas clear, EOM's intact, both eyes   Ears:    Normal external ear canals, both ears   Nose:   Nares normal, septum midline, mucosa normal, no drainage    or sinus tenderness   Throat:   Lips, tongue, gums normal; oral mucosa pink and moist   Neck:   Supple, symmetrical, trachea midline, no adenopathy;     thyroid:  no enlargement/tenderness/nodules; no carotid    bruit or JVD   Back:    Decreased tenderness in the left sacroiliac area.   Lungs:     Clear to auscultation bilaterally, respirations unlabored   Chest Wall:    No tenderness or deformity    Heart:    Regular rate and rhythm, S1 and S2    Abdomen:     Soft nontender no organomegaly detected.  Three-way catheter is out.   Extremities:   Extremities normal, atraumatic, no cyanosis or edema   Pulses:   Pulses palpable in all extremities; symmetric all extremities   Skin:   Skin color normal, Skin is warm and dry,  no rashes or palpable lesions   Neurologic:  Legally blind but able to move lower extremities.            Data Review:    I reviewed the patient's new clinical results.  Results from last 7 days   Lab Units 10/08/21  0820 10/07/21  0959 10/06/21  0659 10/05/21  0949 10/04/21  0757 10/03/21  1723   WBC 10*3/mm3 7.29 6.84 7.25 6.15 6.02 6.50   HEMOGLOBIN g/dL 9.0* 7.7* 8.7* 7.9* 8.3* 9.4*   PLATELETS 10*3/mm3 160 145 140 150 148 149     Results from last 7 days   Lab Units 10/08/21  0820 10/07/21  0959 10/06/21  0659 10/05/21  0949 10/04/21  0757 10/03/21  1723   SODIUM mmol/L 135* 137 138 140 139 139   POTASSIUM mmol/L 4.4 3.9 4.2 4.5 4.7 5.1   CHLORIDE mmol/L 102 105 106 108* 105 104   CO2 mmol/L 24.9 24.1 24.5 22.8 24.8 26.8   BUN mg/dL 25* 29* 26* 27* 30* 34*   CREATININE mg/dL 1.48*  1.67* 1.27 1.28* 1.46* 1.63*   CALCIUM mg/dL 8.9 8.8 8.8 8.8 9.2 9.5   GLUCOSE mg/dL 127* 124* 101* 83 78 113*     Microbiology Results (last 10 days)     Procedure Component Value - Date/Time    Blood Culture - Blood, Arm, Right [[id number]] Collected: 10/06/21 1657    Lab Status: Preliminary result Specimen: Blood from Arm, Right Updated: 10/07/21 1715     Blood Culture No growth at 24 hours    Blood Culture - Blood, Arm, Left [[id number]] Collected: 10/06/21 1648    Lab Status: Preliminary result Specimen: Blood from Arm, Left Updated: 10/07/21 1715     Blood Culture No growth at 24 hours    Urine Culture - Urine, Urine, Random Void [[id number]]  (Abnormal)  (Susceptibility) Collected: 10/03/21 2002    Lab Status: Final result Specimen: Urine, Random Void Updated: 10/06/21 1035     Urine Culture >100,000 CFU/mL Staphylococcus aureus     Comment: Staphylococcus aureus is not typically regarded as a uropathogen, except in cases with genitourinary instrumentation present.  It's presence suggests an alternate source (e.g. bloodstream, abscess, SSTI, etc.).  Please evaluate accordingly.       Susceptibility      Staphylococcus aureus      MARIA ISABEL      Nitrofurantoin Susceptible      Oxacillin Susceptible      Tetracycline Susceptible      Trimethoprim + Sulfamethoxazole Susceptible      Vancomycin Susceptible               Linear View               Susceptibility Comments     Staphylococcus aureus    This isolate does not demonstrate inducible clindamycin resistance in vitro.               COVID PRE-OP / PRE-PROCEDURE SCREENING ORDER (NO ISOLATION) - Swab, Nasopharynx [[id number]]  (Normal) Collected: 10/03/21 1839    Lab Status: Final result Specimen: Swab from Nasopharynx Updated: 10/04/21 0048    Narrative:      The following orders were created for panel order COVID PRE-OP / PRE-PROCEDURE SCREENING ORDER (NO ISOLATION) - Swab, Nasopharynx.  Procedure                               Abnormality         Status                      ---------                               -----------         ------                     COVID-19,APTIMA PANTHER(...[[id number]]  Normal              Final result                 Please view results for these tests on the individual orders.    COVID-19,APTIMA PANTHER(PRAFUL), JINNY, NP/OP SWAB IN UTM/VTM/SALINE TRANSPORT MEDIA,24 HR TAT - Swab, Nasopharynx [[id number]]  (Normal) Collected: 10/03/21 1839    Lab Status: Final result Specimen: Swab from Nasopharynx Updated: 10/04/21 0048     COVID19 Not Detected    Narrative:      Fact sheet for providers: https://www.fda.gov/media/236958/download     Fact sheet for patients: https://www.fda.gov/media/843284/download    Test performed by RT PCR.            Assessment:  1-possible systemic staph aureus sepsis with transient bacteremia and then subsequent passive filtration in the  tract and has positive culture especially the fact that he is complaining of low back pain and left SI joint tenderness versus -with risk of other areas of seeding including possibility of endovascular infection -focal dilatation/bulge of the posterior margin of the abdominal aorta noted on the CT scan of the abdomen and pelvis on 10/4/2021, discitis osteomyelitis or sacroiliitis.  2-ascending  infection from prior intermittent catheterization  B- failed elderly male with multiple comorbidities  C-hematuria with possibilities include primary  malignancy -nodular mass seen in the urinary bladder on CT scan of the abdomen pelvis performed on 10/4/2021, prostatic bleeding or UTI  D- history of benign prostatic hyperplasia  E- other diagnosis per primary team.           Recommendations/Discussions:  See DR Serna discussion and recommendations on 10/6/2021.  After having long conversation with the daughter who is a nurse I recommend 10 to 14 days course of oral antibiotics for staph aureus urinary tract infection-Keflex 500 p.o. every 8 hours for 10 days-and approaching it as if his ascending  infection because of the prior catheterizations he had in the later part of last year earlier this year when he was being managed for CHF and was being diuresed.  I explained to her that subsequent clinical course after completion of antibiotics would dictate whether or approach is correct or if he deteriorates and shows signs and symptoms of disseminated infection in other parts of the body then he may need reevaluation and based on the diagnosis made further treatment recommendation can be afforded.  It is at this juncture daughter indicated that patient usually believes in natural medicine and care and would like to use garlic or homeopathic medicine treatment to take care of his infection.  I conveyed to the daughter that it is perfectly reasonable for patient at the age of 89 to decide how he wants to take care of himself as long as he has been given proper recommendations for his situation that he is in.  Patient's daughter was very thankful.  Sonido Morgan MD  10/8/2021  12:46 EDT

## 2021-10-08 NOTE — PROGRESS NOTES
LOS: 5 days     Chief Complaint/ Reason for encounter: CKD, KAUSHIK  Chief Complaint   Patient presents with   • Dysuria         Subjective     Patient is doing well today with no new complaints  Good appetite with no nausea or vomiting  No shortness of breath chest pain or edema  Voiding well with no dysuria  Dexter out, has not voided yet    10/8 worsenig hematuria- to resume CBI  Remains on IV antibiotics  Transfused 1 unit yesterday, eating and drinking well  Renal function improved today      Medical history reviewed:  History of Present Illness    Subjective    History taken from: Patient and chart    Vital Signs  Temp:  [97.6 °F (36.4 °C)-98.6 °F (37 °C)] 97.6 °F (36.4 °C)  Heart Rate:  [56-77] 68  Resp:  [16] 16  BP: (104-123)/(54-68) 123/54       Wt Readings from Last 1 Encounters:   10/03/21 2104 68.3 kg (150 lb 9.2 oz)       Objective    Objective:  General Appearance:  Comfortable, chronically ill-appearing, in no acute distress and not in pain.  Awake, alert, oriented  HEENT: Mucous membranes moist, no injury, oropharynx clear  Lungs:  Normal effort and normal respiratory rate.  Breath sounds clear to auscultation.  No  respiratory distress.  No rales, decreased breath sounds or rhonchi.    Heart: Normal rate.  Regular rhythm.  S1 normal.  No murmur.   Abdomen: Abdomen is soft.  Bowel sounds are normal, no abdominal tenderness.  There is no rebound or guarding  Extremities: Normal range of motion.  Trace edema of bilateral lower extremities, distal pulses intact  Neurological: No focal motor or sensory deficits, pupils reactive  Skin:  Warm and dry.  No rash or cyanosis.       Results Review:    Intake/Output:     Intake/Output Summary (Last 24 hours) at 10/8/2021 1004  Last data filed at 10/7/2021 1906  Gross per 24 hour   Intake 300 ml   Output 150 ml   Net 150 ml         DATA:  Radiology and Labs:  The following labs independently reviewed by me. Additional labs ordered for tomorrow a.m.  Interval  notes, chart personally reviewed by me.   Old records independently reviewed showing CKD 3  Discussed with patient/family    Risk/ complexity of medical care/ medical decision making moderate    Labs:   Recent Results (from the past 24 hour(s))   Prepare RBC, 1 Units    Collection Time: 10/08/21  6:00 AM   Result Value Ref Range    Product Code D1408F87     Unit Number [id number]     UNIT  ABO B     UNIT  RH POS     Crossmatch Interpretation Compatible     Dispense Status PT     Blood Expiration Date [id number]     Blood Type Barcode 7300    Basic Metabolic Panel    Collection Time: 10/08/21  8:20 AM    Specimen: Blood   Result Value Ref Range    Glucose 127 (H) 65 - 99 mg/dL    BUN 25 (H) 8 - 23 mg/dL    Creatinine 1.48 (H) 0.76 - 1.27 mg/dL    Sodium 135 (L) 136 - 145 mmol/L    Potassium 4.4 3.5 - 5.2 mmol/L    Chloride 102 98 - 107 mmol/L    CO2 24.9 22.0 - 29.0 mmol/L    Calcium 8.9 8.2 - 9.6 mg/dL    eGFR Non African Amer 44 (L) >60 mL/min/1.73    BUN/Creatinine Ratio 16.9 7.0 - 25.0    Anion Gap 8.1 5.0 - 15.0 mmol/L   CBC Auto Differential    Collection Time: 10/08/21  8:20 AM    Specimen: Blood   Result Value Ref Range    WBC 7.29 3.40 - 10.80 10*3/mm3    RBC 2.86 (L) 4.14 - 5.80 10*6/mm3    Hemoglobin 9.0 (L) 13.0 - 17.7 g/dL    Hematocrit 26.6 (L) 37.5 - 51.0 %    MCV 93.0 79.0 - 97.0 fL    MCH 31.5 26.6 - 33.0 pg    MCHC 33.8 31.5 - 35.7 g/dL    RDW 14.6 12.3 - 15.4 %    RDW-SD 49.6 37.0 - 54.0 fl    MPV 9.8 6.0 - 12.0 fL    Platelets 160 140 - 450 10*3/mm3    Neutrophil % 65.8 42.7 - 76.0 %    Lymphocyte % 16.6 (L) 19.6 - 45.3 %    Monocyte % 10.6 5.0 - 12.0 %    Eosinophil % 5.8 0.3 - 6.2 %    Basophil % 0.7 0.0 - 1.5 %    Immature Grans % 0.5 0.0 - 0.5 %    Neutrophils, Absolute 4.80 1.70 - 7.00 10*3/mm3    Lymphocytes, Absolute 1.21 0.70 - 3.10 10*3/mm3    Monocytes, Absolute 0.77 0.10 - 0.90 10*3/mm3    Eosinophils, Absolute 0.42 (H) 0.00 - 0.40 10*3/mm3    Basophils, Absolute 0.05 0.00 - 0.20  10*3/mm3    Immature Grans, Absolute 0.04 0.00 - 0.05 10*3/mm3    nRBC 0.0 0.0 - 0.2 /100 WBC       Radiology:  Imaging Results (Last 24 Hours)       ** No results found for the last 24 hours. **               Medications have been reviewed:  Current Facility-Administered Medications   Medication Dose Route Frequency Provider Last Rate Last Admin   • acetaminophen (TYLENOL) tablet 650 mg  650 mg Oral Q4H PRN Alen Murcia MD   650 mg at 10/06/21 2328   • ceFAZolin in dextrose (ANCEF) IVPB solution 2 g  2 g Intravenous Q12H Claudia Serna MD   2 g at 10/08/21 0812   • digoxin (LANOXIN) tablet 125 mcg  125 mcg Oral Daily With Lunch Mikal Collier Jr., MD   125 mcg at 10/07/21 1217   • famotidine (PEPCID) tablet 20 mg  20 mg Oral BID Mikal Collier Jr., MD       • ferrous sulfate tablet 325 mg  325 mg Oral Daily With Breakfast Mikal Collier Jr., MD   325 mg at 10/08/21 0811   • latanoprost (XALATAN) 0.005 % ophthalmic solution 1 drop  1 drop Both Eyes Nightly Mikal Collier Jr., MD   1 drop at 10/07/21 2100   • tamsulosin (FLOMAX) 24 hr capsule 0.4 mg  0.4 mg Oral Nightly Mikal Collier Jr., MD   0.4 mg at 10/07/21 2140       ASSESSMENT:  Acute kidney injury versus CKD.  Creatinine improved with fluids but a bit worse today  Gross hematuria status post cystoscopy with clot evacuation  UTI on IV cefazolin  Worsening anemia, in part acute blood loss anemia.  May have some component of anemia of CKD, transfused.  On oral iron  Gastroesophageal reflux disease  BPH on Flomax  Chronic CHF, unknown if systolic or diastolic        PLAN:   Review of records from [place] shows that his baseline creatinine is around 1.2-1.4  Creatinine up slightly yesterday but improved, near baseline today  Euvolemic on exam, holding diuretics  Dexter out, voiding well  Status post cystoscopy with clot evacuation 10/6  Worsening hematuria, CBI to resume  Agree with  antibiotics and continue to encourage oral fluids  Follow-up  labs and electrolytes in a.m.       Axel Aguirre MD   [facility]   Office phone number: [phone]  Answering service phone number: [phone]    10/08/21  10:04 EDT    Dictation performed using Dragon dictation software

## 2021-10-09 ENCOUNTER — READMISSION MANAGEMENT (OUTPATIENT)
Dept: CALL CENTER | Facility: HOSPITAL | Age: 86
End: 2021-10-09

## 2021-10-09 VITALS
HEART RATE: 77 BPM | TEMPERATURE: 99.2 F | HEIGHT: 69 IN | SYSTOLIC BLOOD PRESSURE: 127 MMHG | RESPIRATION RATE: 20 BRPM | BODY MASS INDEX: 22.3 KG/M2 | OXYGEN SATURATION: 99 % | DIASTOLIC BLOOD PRESSURE: 72 MMHG | WEIGHT: 150.57 LBS

## 2021-10-09 LAB
ANION GAP SERPL CALCULATED.3IONS-SCNC: 6.1 MMOL/L (ref 5–15)
BASOPHILS # BLD AUTO: 0.03 10*3/MM3 (ref 0–0.2)
BASOPHILS NFR BLD AUTO: 0.5 % (ref 0–1.5)
BUN SERPL-MCNC: 24 MG/DL (ref 8–23)
BUN/CREAT SERPL: 19.8 (ref 7–25)
CALCIUM SPEC-SCNC: 8.8 MG/DL (ref 8.2–9.6)
CHLORIDE SERPL-SCNC: 104 MMOL/L (ref 98–107)
CO2 SERPL-SCNC: 24.9 MMOL/L (ref 22–29)
CREAT SERPL-MCNC: 1.21 MG/DL (ref 0.76–1.27)
DEPRECATED RDW RBC AUTO: 50.3 FL (ref 37–54)
EOSINOPHIL # BLD AUTO: 0.26 10*3/MM3 (ref 0–0.4)
EOSINOPHIL NFR BLD AUTO: 4 % (ref 0.3–6.2)
ERYTHROCYTE [DISTWIDTH] IN BLOOD BY AUTOMATED COUNT: 14.3 % (ref 12.3–15.4)
GFR SERPL CREATININE-BSD FRML MDRD: 56 ML/MIN/1.73
GLUCOSE SERPL-MCNC: 101 MG/DL (ref 65–99)
HCT VFR BLD AUTO: 25.6 % (ref 37.5–51)
HGB BLD-MCNC: 8.5 G/DL (ref 13–17.7)
IMM GRANULOCYTES # BLD AUTO: 0.05 10*3/MM3 (ref 0–0.05)
IMM GRANULOCYTES NFR BLD AUTO: 0.8 % (ref 0–0.5)
LYMPHOCYTES # BLD AUTO: 0.93 10*3/MM3 (ref 0.7–3.1)
LYMPHOCYTES NFR BLD AUTO: 14.4 % (ref 19.6–45.3)
MCH RBC QN AUTO: 31.8 PG (ref 26.6–33)
MCHC RBC AUTO-ENTMCNC: 33.2 G/DL (ref 31.5–35.7)
MCV RBC AUTO: 95.9 FL (ref 79–97)
MONOCYTES # BLD AUTO: 0.72 10*3/MM3 (ref 0.1–0.9)
MONOCYTES NFR BLD AUTO: 11.1 % (ref 5–12)
NEUTROPHILS NFR BLD AUTO: 4.47 10*3/MM3 (ref 1.7–7)
NEUTROPHILS NFR BLD AUTO: 69.2 % (ref 42.7–76)
NRBC BLD AUTO-RTO: 0 /100 WBC (ref 0–0.2)
PLATELET # BLD AUTO: 183 10*3/MM3 (ref 140–450)
PMV BLD AUTO: 9.7 FL (ref 6–12)
POTASSIUM SERPL-SCNC: 4.4 MMOL/L (ref 3.5–5.2)
RBC # BLD AUTO: 2.67 10*6/MM3 (ref 4.14–5.8)
SODIUM SERPL-SCNC: 135 MMOL/L (ref 136–145)
WBC # BLD AUTO: 6.46 10*3/MM3 (ref 3.4–10.8)

## 2021-10-09 PROCEDURE — 85025 COMPLETE CBC W/AUTO DIFF WBC: CPT | Performed by: HOSPITALIST

## 2021-10-09 PROCEDURE — 25010000003 CEFAZOLIN IN DEXTROSE 2-4 GM/100ML-% SOLUTION: Performed by: INTERNAL MEDICINE

## 2021-10-09 PROCEDURE — 80048 BASIC METABOLIC PNL TOTAL CA: CPT | Performed by: HOSPITALIST

## 2021-10-09 RX ORDER — FAMOTIDINE 20 MG/1
20 TABLET, FILM COATED ORAL 2 TIMES DAILY
Qty: 60 TABLET | Refills: 0 | Status: SHIPPED | OUTPATIENT
Start: 2021-10-09 | End: 2021-11-08

## 2021-10-09 RX ORDER — CEPHALEXIN 500 MG/1
500 CAPSULE ORAL 3 TIMES DAILY
Status: DISCONTINUED | OUTPATIENT
Start: 2021-10-09 | End: 2021-10-09 | Stop reason: HOSPADM

## 2021-10-09 RX ORDER — CEPHALEXIN 500 MG/1
500 CAPSULE ORAL 3 TIMES DAILY
Qty: 10 CAPSULE | Refills: 0 | Status: SHIPPED | OUTPATIENT
Start: 2021-10-09 | End: 2021-10-13

## 2021-10-09 RX ADMIN — DIGOXIN 125 MCG: 125 TABLET ORAL at 13:27

## 2021-10-09 RX ADMIN — CEFAZOLIN SODIUM 2 G: 2 INJECTION, SOLUTION INTRAVENOUS at 09:10

## 2021-10-09 RX ADMIN — FERROUS SULFATE TAB 325 MG (65 MG ELEMENTAL FE) 325 MG: 325 (65 FE) TAB at 09:10

## 2021-10-09 NOTE — PLAN OF CARE
Goal Outcome Evaluation:  Plan of Care Reviewed With: patient        Progress: improving  Outcome Summary: VSS overnight on RA. AOx4. Daughter remained at bedside throughout night. Requested staff not wake pt to turn. Bladder irrigation continued, urine became more clear as night progressed. Pt has no complaints of pain. Takes meds whole in applesauce. No new concerns at this time. WCTM.

## 2021-10-09 NOTE — PROGRESS NOTES
"   LOS: 6 days     Chief Complaint/ Reason for encounter: CKD, KAUSHIK  Chief Complaint   Patient presents with   • Dysuria         Subjective     Patient is doing well today with no new complaints  Good appetite with no nausea or vomiting  No shortness of breath chest pain or edema  Voiding well with no dysuria  Jenkins out, has not voided yet    10/8 worsenig hematuria- to resume CBI  Remains on IV antibiotics  Transfused 1 unit yesterday, eating and drinking well  Renal function improved today    10/09 was unable to void. 3 way jenkins placed and currently on CBI. He is feeling ok, denies sob or chest pain.      Medical history reviewed:  History of Present Illness    Subjective    History taken from: Patient and chart    Vital Signs  Temp:  [97.4 °F (36.3 °C)-100.5 °F (38.1 °C)] 99.2 °F (37.3 °C)  Heart Rate:  [64-73] 72  Resp:  [16-20] 20  BP: (116-128)/(53-65) 121/53       Wt Readings from Last 1 Encounters:   10/03/21 2104 68.3 kg (150 lb 9.2 oz)       Objective:  Vital signs: (most recent): Blood pressure 121/53, pulse 72, temperature 99.2 °F (37.3 °C), temperature source Oral, resp. rate 20, height 175.3 cm (69\"), weight 68.3 kg (150 lb 9.2 oz), SpO2 99 %.              Objective:  General Appearance:  Comfortable, chronically ill-appearing, in no acute distress and not in pain.  Awake, alert, oriented  HEENT: Mucous membranes moist, no injury, oropharynx clear  Lungs:  Normal effort and normal respiratory rate.  Breath sounds clear to auscultation.  No  respiratory distress.  No rales, decreased breath sounds or rhonchi.    Heart: Normal rate.  Regular rhythm.  S1 normal.  No murmur.   Abdomen: Abdomen is soft.  Bowel sounds are normal, no abdominal tenderness.  There is no rebound or guarding  Extremities: Normal range of motion.  Trace edema of bilateral lower extremities, distal pulses intact  Neurological: No focal motor or sensory deficits, pupils reactive  Skin:  Warm and dry.  No rash or cyanosis. "       Results Review:    Intake/Output:     Intake/Output Summary (Last 24 hours) at 10/9/2021 1323  Last data filed at 10/9/2021 0910  Gross per 24 hour   Intake 3000 ml   Output 8500 ml   Net -5500 ml         DATA:  Radiology and Labs:  The following labs independently reviewed by me. Additional labs ordered for tomorrow a.m.  Interval notes, chart personally reviewed by me.   Old records independently reviewed showing CKD 3  Discussed with patient/family    Risk/ complexity of medical care/ medical decision making moderate    Labs:   Recent Results (from the past 24 hour(s))   Basic Metabolic Panel    Collection Time: 10/09/21  7:16 AM    Specimen: Blood   Result Value Ref Range    Glucose 101 (H) 65 - 99 mg/dL    BUN 24 (H) 8 - 23 mg/dL    Creatinine 1.21 0.76 - 1.27 mg/dL    Sodium 135 (L) 136 - 145 mmol/L    Potassium 4.4 3.5 - 5.2 mmol/L    Chloride 104 98 - 107 mmol/L    CO2 24.9 22.0 - 29.0 mmol/L    Calcium 8.8 8.2 - 9.6 mg/dL    eGFR Non African Amer 56 (L) >60 mL/min/1.73    BUN/Creatinine Ratio 19.8 7.0 - 25.0    Anion Gap 6.1 5.0 - 15.0 mmol/L   CBC Auto Differential    Collection Time: 10/09/21  7:16 AM    Specimen: Blood   Result Value Ref Range    WBC 6.46 3.40 - 10.80 10*3/mm3    RBC 2.67 (L) 4.14 - 5.80 10*6/mm3    Hemoglobin 8.5 (L) 13.0 - 17.7 g/dL    Hematocrit 25.6 (L) 37.5 - 51.0 %    MCV 95.9 79.0 - 97.0 fL    MCH 31.8 26.6 - 33.0 pg    MCHC 33.2 31.5 - 35.7 g/dL    RDW 14.3 12.3 - 15.4 %    RDW-SD 50.3 37.0 - 54.0 fl    MPV 9.7 6.0 - 12.0 fL    Platelets 183 140 - 450 10*3/mm3    Neutrophil % 69.2 42.7 - 76.0 %    Lymphocyte % 14.4 (L) 19.6 - 45.3 %    Monocyte % 11.1 5.0 - 12.0 %    Eosinophil % 4.0 0.3 - 6.2 %    Basophil % 0.5 0.0 - 1.5 %    Immature Grans % 0.8 (H) 0.0 - 0.5 %    Neutrophils, Absolute 4.47 1.70 - 7.00 10*3/mm3    Lymphocytes, Absolute 0.93 0.70 - 3.10 10*3/mm3    Monocytes, Absolute 0.72 0.10 - 0.90 10*3/mm3    Eosinophils, Absolute 0.26 0.00 - 0.40 10*3/mm3     Basophils, Absolute 0.03 0.00 - 0.20 10*3/mm3    Immature Grans, Absolute 0.05 0.00 - 0.05 10*3/mm3    nRBC 0.0 0.0 - 0.2 /100 WBC       Radiology:  Imaging Results (Last 24 Hours)     ** No results found for the last 24 hours. **             Medications have been reviewed:  Current Facility-Administered Medications   Medication Dose Route Frequency Provider Last Rate Last Admin   • acetaminophen (TYLENOL) tablet 650 mg  650 mg Oral Q4H PRN Alen Murcia MD   650 mg at 10/06/21 2328   • ceFAZolin in dextrose (ANCEF) IVPB solution 2 g  2 g Intravenous Q12H Claudia Serna MD   2 g at 10/09/21 0910   • digoxin (LANOXIN) tablet 125 mcg  125 mcg Oral Daily With Lunch Mikal Collier Jr., MD   125 mcg at 10/08/21 1338   • famotidine (PEPCID) tablet 20 mg  20 mg Oral BID Mikal Collier Jr., MD   20 mg at 10/08/21 2058   • ferrous sulfate tablet 325 mg  325 mg Oral Daily With Breakfast Mikal Collier Jr., MD   325 mg at 10/09/21 0910   • latanoprost (XALATAN) 0.005 % ophthalmic solution 1 drop  1 drop Both Eyes Nightly Mikal Collier Jr., MD   1 drop at 10/08/21 2059   • tamsulosin (FLOMAX) 24 hr capsule 0.4 mg  0.4 mg Oral Nightly Mikal Collier Jr., MD   0.4 mg at 10/08/21 2058       ASSESSMENT:  Acute kidney injury versus CKD.  Creatinine improved with fluids but a bit worse today  Gross hematuria status post cystoscopy with clot evacuation  UTI on IV cefazolin  Worsening anemia, in part acute blood loss anemia.  May have some component of anemia of CKD, transfused.  On oral iron  Gastroesophageal reflux disease  BPH on Flomax  Chronic CHF, unknown if systolic or diastolic        PLAN:   Review of records from [place] shows that his baseline creatinine is around 1.2-1.4  Creatinine within his baseline today   Euvolemic on exam, holding diuretics  Failed voiding trial and 3 way catheter in place, on CBI.   Status post cystoscopy with clot evacuation 10/6  Agree with  antibiotics and continue to encourage  oral fluids  Follow-up labs and electrolytes in [email or web address].       Samantha Aguillon MD  [facility]   Office phone number: [phone]  Answering service phone number: [phone]    10/09/21  13:23 EDT

## 2021-10-09 NOTE — PROGRESS NOTES
"Daily progress note    Chief complaint  Doing better this morning  No new complaints  Wants to go home  Family at bedside    History of present illness  89-year-old white male who lives at home with his wife with history of BPH chronic anemia osteoarthritis and gastroesophageal reflux disease presented to [facility] emergency room with bloody urine started day before yesterday. Patient also have dysuria and increased frequency prior to that. Patient denies any fever chest pain Hartness of breath abdominal pain nausea vomiting diarrhea. Patient work-up in ER revealed calli hematuria and UTI admit for management     REVIEW OF SYSTEMS  Unremarkable except generalized weakness    PHYSICAL EXAM   Blood pressure 127/72, pulse 77, temperature 99.2 °F (37.3 °C), temperature source Oral, resp. rate 20, height 175.3 cm (69\"), weight 68.3 kg (150 lb 9.2 oz), SpO2 99 %.    Constitutional: Pt. is awake and alert and in no distress.    HENT: Normocephalic and atraumatic.   Neck: Normal range of motion. Neck supple. No JVD present.   Cardiovascular: Normal rate, regular rhythm and normal heart sounds. Exam reveals no gallop and no friction rub.   No murmur heard.  Pulmonary/Chest: Effort normal and breath sounds normal. No stridor. No respiratory distress. No wheezes, no rales.   Abdominal: Soft. Bowel sounds are normal. No distension. There is no tenderness. There is no rebound and no guarding.   Musculoskeletal: Age-appropriate range of motion. No edema, tenderness or deformity.   Neurological: Pt. is awake and alert.  Cranial nerves II through XII are intact.  He has no focal neurologic deficits  Skin: Skin is warm and dry. No rash noted.  And cauterized is not diaphoretic. No erythema.   Psychiatric: Mood, affect normal.  He is pleasant and cooperative.    LAB RESULTS  Lab Results (last 24 hours)     Procedure Component Value Units Date/Time    Basic Metabolic Panel [[id number]]  (Abnormal) Collected: 10/09/21 0716 "    Specimen: Blood Updated: 10/09/21 0840     Glucose 101 mg/dL      BUN 24 mg/dL      Creatinine 1.21 mg/dL      Sodium 135 mmol/L      Potassium 4.4 mmol/L      Chloride 104 mmol/L      CO2 24.9 mmol/L      Calcium 8.8 mg/dL      eGFR Non African Amer 56 mL/min/1.73      BUN/Creatinine Ratio 19.8     Anion Gap 6.1 mmol/L     Narrative:      GFR Normal >60  Chronic Kidney Disease <60  Kidney Failure <15      CBC & Differential [[id number]]  (Abnormal) Collected: 10/09/21 0716    Specimen: Blood Updated: 10/09/21 0834    Narrative:      The following orders were created for panel order CBC & Differential.  Procedure                               Abnormality         Status                     ---------                               -----------         ------                     CBC Auto Differential[[id number]]        Abnormal            Final result                 Please view results for these tests on the individual orders.    CBC Auto Differential [[id number]]  (Abnormal) Collected: 10/09/21 0716    Specimen: Blood Updated: 10/09/21 0834     WBC 6.46 10*3/mm3      RBC 2.67 10*6/mm3      Hemoglobin 8.5 g/dL      Hematocrit 25.6 %      MCV 95.9 fL      MCH 31.8 pg      MCHC 33.2 g/dL      RDW 14.3 %      RDW-SD 50.3 fl      MPV 9.7 fL      Platelets 183 10*3/mm3      Neutrophil % 69.2 %      Lymphocyte % 14.4 %      Monocyte % 11.1 %      Eosinophil % 4.0 %      Basophil % 0.5 %      Immature Grans % 0.8 %      Neutrophils, Absolute 4.47 10*3/mm3      Lymphocytes, Absolute 0.93 10*3/mm3      Monocytes, Absolute 0.72 10*3/mm3      Eosinophils, Absolute 0.26 10*3/mm3      Basophils, Absolute 0.03 10*3/mm3      Immature Grans, Absolute 0.05 10*3/mm3      nRBC 0.0 /100 WBC     Blood Culture - Blood, Arm, Left [[id number]] Collected: 10/06/21 1648    Specimen: Blood from Arm, Left Updated: 10/08/21 1715     Blood Culture No growth at 2 days    Blood Culture - Blood, Arm, Right [[id number]] Collected: 10/06/21 0844     Specimen: Blood from Arm, Right Updated: 10/08/21 3251     Blood Culture No growth at 2 days        Imaging Results (Last 24 Hours)     ** No results found for the last 24 hours. **          Current Facility-Administered Medications:   •  acetaminophen (TYLENOL) tablet 650 mg, 650 mg, Oral, Q4H PRN, Evette Murcia MD, 650 mg at 10/06/21 2328  •  ceFAZolin in dextrose (ANCEF) IVPB solution 2 g, 2 g, Intravenous, Q12H, Claudia Serna MD, 2 g at 10/09/21 0910  •  digoxin (LANOXIN) tablet 125 mcg, 125 mcg, Oral, Daily With Lunch, Mikal Collier Jr., MD, 125 mcg at 10/09/21 1327  •  famotidine (PEPCID) tablet 20 mg, 20 mg, Oral, BID, Mikal Collier Jr., MD, 20 mg at 10/08/21 2058  •  ferrous sulfate tablet 325 mg, 325 mg, Oral, Daily With Breakfast, Mikal Collier Jr., MD, 325 mg at 10/09/21 0910  •  latanoprost (XALATAN) 0.005 % ophthalmic solution 1 drop, 1 drop, Both Eyes, Nightly, Mikal Collier Jr., MD, 1 drop at 10/08/21 2059  •  tamsulosin (FLOMAX) 24 hr capsule 0.4 mg, 0.4 mg, Oral, Nightly, Mikal Collier Jr., MD, 0.4 mg at 10/08/21 2058    ASSESSMENT  Acute MSSA UTI  Hematuria s/p cystoscopy with clot evacuation and cauterization  BPH  Chronic anemia   Acute kidney injury  Chronic kidney disease stage III  Gastroesophageal flux disease    PLAN  Discharge home with family support and home health on oral antibiotics and Dexter catheter  Discharge summary dictated    EVETTE MURCIA MD

## 2021-10-09 NOTE — PROGRESS NOTES
"  Infectious Diseases Progress Note        [facility]  Los: 6 days  Patient Identification:  Name: Vinicio Samayoa  Age: 89 y.o.  Sex: male  :  1924  MRN: [id number]         Primary Care Physician: Adolfo Thacker MD            Subjective: Overall feeling better.  Had his catheter removed earlier today.  Daughter at the bedside conveyed to me that patient is not significantly interested in aggressive treatment with modern medicine and try to use his treatment such as garlic or homeopathic treatments.  I conveyed to her that it is his choice for he want to take care of himself our role here is to provide him the best treatment recommendation for the diagnosis that we have made and leave it up to him and the family to decide how they want to proceed and whether or not to heed those recommendations.  Interval History: See consultation note.    Objective:    Scheduled Meds:ceFAZolin, 2 g, Intravenous, Q12H  digoxin, 125 mcg, Oral, Daily With Lunch  famotidine, 20 mg, Oral, BID  ferrous sulfate, 325 mg, Oral, Daily With Breakfast  latanoprost, 1 drop, Both Eyes, Nightly  tamsulosin, 0.4 mg, Oral, Nightly      Continuous Infusions:     Vital signs in last 24 hours:  Temp:  [97.4 °F (36.3 °C)-100.5 °F (38.1 °C)] 99.2 °F (37.3 °C)  Heart Rate:  [64-73] 72  Resp:  [16-20] 20  BP: (116-128)/(53-65) 121/53    Intake/Output:    Intake/Output Summary (Last 24 hours) at 10/9/2021 1138  Last data filed at 10/9/2021 0910  Gross per 24 hour   Intake 3000 ml   Output 8500 ml   Net -5500 ml       Exam:  /53 (BP Location: Left arm, Patient Position: Lying)   Pulse 72   Temp 99.2 °F (37.3 °C) (Oral)   Resp 20   Ht 175.3 cm (69\")   Wt 68.3 kg (150 lb 9.2 oz)   SpO2 99%   BMI 22.24 kg/m²   Patient is examined using the personal protective equipment as per guidelines from infection control for this particular patient as enacted.  Hand washing was performed before and after patient " interaction.  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   Head:    Normocephalic, without obvious abnormality, atraumatic   Eyes:    PERRL, conjunctivae/corneas clear, EOM's intact, both eyes   Ears:    Normal external ear canals, both ears   Nose:   Nares normal, septum midline, mucosa normal, no drainage    or sinus tenderness   Throat:   Lips, tongue, gums normal; oral mucosa pink and moist   Neck:   Supple, symmetrical, trachea midline, no adenopathy;     thyroid:  no enlargement/tenderness/nodules; no carotid    bruit or JVD   Back:    Decreased tenderness in the left sacroiliac area.   Lungs:     Clear to auscultation bilaterally, respirations unlabored   Chest Wall:    No tenderness or deformity    Heart:    Regular rate and rhythm, S1 and S2    Abdomen:     Soft nontender no organomegaly detected.  Three-way catheter is out.   Extremities:   Extremities normal, atraumatic, no cyanosis or edema   Pulses:   Pulses palpable in all extremities; symmetric all extremities   Skin:   Skin color normal, Skin is warm and dry,  no rashes or palpable lesions   Neurologic:  Legally blind but able to move lower extremities.            Data Review:    I reviewed the patient's new clinical results.  Results from last 7 days   Lab Units 10/09/21  0716 10/08/21  0820 10/07/21  0959 10/06/21  0659 10/05/21  0949 10/04/21  0757 10/03/21  1723   WBC 10*3/mm3 6.46 7.29 6.84 7.25 6.15 6.02 6.50   HEMOGLOBIN g/dL 8.5* 9.0* 7.7* 8.7* 7.9* 8.3* 9.4*   PLATELETS 10*3/mm3 183 160 145 140 150 148 149     Results from last 7 days   Lab Units 10/09/21  0716 10/08/21  0820 10/07/21  0959 10/06/21  0659 10/05/21  0949 10/04/21  0757 10/03/21  1723   SODIUM mmol/L 135* 135* 137 138 140 139 139   POTASSIUM mmol/L 4.4 4.4 3.9 4.2 4.5 4.7 5.1   CHLORIDE mmol/L 104 102 105 106 108* 105 104   CO2 mmol/L 24.9 24.9  24.1 24.5 22.8 24.8 26.8   BUN mg/dL 24* 25* 29* 26* 27* 30* 34*   CREATININE mg/dL 1.21 1.48* 1.67* 1.27 1.28* 1.46* 1.63*   CALCIUM mg/dL 8.8 8.9 8.8 8.8 8.8 9.2 9.5   GLUCOSE mg/dL 101* 127* 124* 101* 83 78 113*     Microbiology Results (last 10 days)     Procedure Component Value - Date/Time    Blood Culture - Blood, Arm, Right [[id number]] Collected: 10/06/21 1657    Lab Status: Preliminary result Specimen: Blood from Arm, Right Updated: 10/08/21 1715     Blood Culture No growth at 2 days    Blood Culture - Blood, Arm, Left [[id number]] Collected: 10/06/21 1648    Lab Status: Preliminary result Specimen: Blood from Arm, Left Updated: 10/08/21 1715     Blood Culture No growth at 2 days    Urine Culture - Urine, Urine, Random Void [[id number]]  (Abnormal)  (Susceptibility) Collected: 10/03/21 2002    Lab Status: Final result Specimen: Urine, Random Void Updated: 10/06/21 1035     Urine Culture >100,000 CFU/mL Staphylococcus aureus     Comment: Staphylococcus aureus is not typically regarded as a uropathogen, except in cases with genitourinary instrumentation present.  It's presence suggests an alternate source (e.g. bloodstream, abscess, SSTI, etc.).  Please evaluate accordingly.       Susceptibility      Staphylococcus aureus     MARIA ISABEL     Nitrofurantoin Susceptible     Oxacillin Susceptible     Tetracycline Susceptible     Trimethoprim + Sulfamethoxazole Susceptible     Vancomycin Susceptible                  Linear View               Susceptibility Comments     Staphylococcus aureus    This isolate does not demonstrate inducible clindamycin resistance in vitro.               COVID PRE-OP / PRE-PROCEDURE SCREENING ORDER (NO ISOLATION) - Swab, Nasopharynx [[id number]]  (Normal) Collected: 10/03/21 1839    Lab Status: Final result Specimen: Swab from Nasopharynx Updated: 10/04/21 0048    Narrative:      The following orders were created for panel order COVID PRE-OP / PRE-PROCEDURE SCREENING ORDER (NO ISOLATION) - Swab,  Nasopharynx.  Procedure                               Abnormality         Status                     ---------                               -----------         ------                     COVID-19,APTIMA PANTHER(...[[id number]]  Normal              Final result                 Please view results for these tests on the individual orders.    COVID-19,APTIMA PANTHER(PRAFUL), JINNY, NP/OP SWAB IN UTM/VTM/SALINE TRANSPORT MEDIA,24 HR TAT - Swab, Nasopharynx [[id number]]  (Normal) Collected: 10/03/21 1839    Lab Status: Final result Specimen: Swab from Nasopharynx Updated: 10/04/21 0048     COVID19 Not Detected    Narrative:      Fact sheet for providers: https://www.fda.gov/media/836050/download     Fact sheet for patients: https://www.fda.gov/media/846627/download    Test performed by RT PCR.            Assessment:  1-possible systemic staph aureus sepsis with transient bacteremia and then subsequent passive filtration in the  tract and has positive culture especially the fact that he is complaining of low back pain and left SI joint tenderness versus -with risk of other areas of seeding including possibility of endovascular infection -focal dilatation/bulge of the posterior margin of the abdominal aorta noted on the CT scan of the abdomen and pelvis on 10/4/2021, discitis osteomyelitis or sacroiliitis.  2-ascending  infection from prior intermittent catheterization  B- failed elderly male with multiple comorbidities  C-hematuria with possibilities include primary  malignancy -nodular mass seen in the urinary bladder on CT scan of the abdomen pelvis performed on 10/4/2021, prostatic bleeding or UTI  D- history of benign prostatic hyperplasia  E- other diagnosis per primary team.           Recommendations/Discussions:  See DR Serna discussion and recommendations on 10/6/2021.  After having long conversation with the daughter who is a nurse I recommend 10 to 14 days course of oral antibiotics for staph aureus urinary  tract infection-Keflex 500 p.o. every 8 hours for 10 days-and approaching it as if his ascending infection because of the prior catheterizations he had in the later part of last year earlier this year when he was being managed for CHF and was being diuresed.  I explained to her that subsequent clinical course after completion of antibiotics would dictate whether or approach is correct or if he deteriorates and shows signs and symptoms of disseminated infection in other parts of the body then he may need reevaluation and based on the diagnosis made further treatment recommendation can be afforded.  It is at this juncture daughter indicated that patient usually believes in natural medicine and care and would like to use garlic or homeopathic medicine treatment to take care of his infection.  I conveyed to the daughter that it is perfectly reasonable for patient at the age of 89 to decide how he wants to take care of himself as long as he has been given proper recommendations for his situation that he is in.  Patient's daughter was very thankful.  Sonido Morgan MD  10/9/2021  11:38 EDT

## 2021-10-09 NOTE — DISCHARGE SUMMARY
Patient Care Team:  Calli Thacker MD as PCP - General (Internal Medicine)        Chief complaint urinary retention    History of Present Illness:  89-year-old white male who lives at home with his wife with history of BPH chronic anemia osteoarthritis and gastroesophageal reflux disease presented to [facility] emergency room with bloody urine started day before 10/08/21. Patient also have dysuria and increased frequency prior to that. Patient denies any fever chest pain Hartness of breath abdominal pain nausea vomiting diarrhea. Patient work-up in ER revealed calli hematuria and UTI admit for management. Pt had clot evac with urology    Review of Systems   All systems were reviewed and negative except for those in HPI    History  Past Medical History:   Diagnosis Date   • Cancer (HCC)     lt ear    • CHF (congestive heart failure) (HCC)    • Deaf, left     Partial hearing Rt ear    • Fractures     collar bone, vertebrae   • Kidney stones     40 yrs ago      Past Surgical History:   Procedure Laterality Date   • CATARACT EXTRACTION Bilateral    • CORONARY ARTERY BYPASS GRAFT      4 vessel-30 yrs ago    • CYSTOSCOPY WITH CLOT EVACUATION N/A 10/6/2021    Procedure: CYSTOSCOPY WITH CLOT EVACUATION, FULGURATION OF BLEEDING, & CATHETER PLACEMENT;  Surgeon: Mikal Collier Jr., MD;  Location: [facility];  Service: Urology;  Laterality: N/A;   • TONSILLECTOMY       History reviewed. No pertinent family history.  Social History     Tobacco Use   • Smoking status: Former Smoker   • Smokeless tobacco: Never Used   Vaping Use   • Vaping Use: Never used   Substance Use Topics   • Alcohol use: Not Currently   • Drug use: Not Currently     Medications Prior to Admission   Medication Sig Dispense Refill Last Dose   • DANDELION PO Take  by mouth 3 (Three) Times a Day.   10/3/2021 at 1200   • digoxin (LANOXIN) 125 MCG tablet Take 125 mcg by mouth Daily With Lunch.   10/2/2021 at 1200   • furosemide  (LASIX) 20 MG tablet Take 20 mg by mouth Daily As Needed.      • Ginkgo Biloba (GINKOBA PO) Take 2 capsules by mouth Daily.   10/3/2021 at 0800   • HAWTHORN PO Take  by mouth 3 (Three) Times a Day.   10/3/2021 at 1200   • latanoprost (XALATAN) 0.005 % ophthalmic solution 1 drop Every Night.   10/2/2021 at 2100   • Nutritional Supplements (GRAPESEED EXTRACT PO) Take  by mouth Daily.   10/3/2021 at 0800   • tamsulosin (FLOMAX) 0.4 MG capsule 24 hr capsule Take 1 capsule by mouth Every Night.   10/2/2021 at 2100   • acetaminophen (TYLENOL) 500 MG chewable tablet Chew 500 mg.      • ferrous sulfate 325 (65 FE) MG tablet Take 325 mg by mouth Daily With Breakfast.   Unknown at Unknown time     Allergies:  Sulfa antibiotics    Objective     Vital Signs  Temp:  [97.4 °F (36.3 °C)-100.5 °F (38.1 °C)] 99.2 °F (37.3 °C)  Heart Rate:  [64-77] 77  Resp:  [16-20] 20  BP: (116-128)/(53-72) 127/72    Physical Exam:      General Appearance:    Alert, cooperative, in no acute distress   Head:    Normocephalic, without obvious abnormality, atraumatic   Eyes:            EOMI   Ears:    Ears appear intact with no abnormalities noted       Neck:   No adenopathy, supple       Lungs:     Respirations regular, even and unlabored    Heart:    Regular rhythm and normal rate       Abdomen:     Soft,non-tender, non-distended, no guarding, no rebounding            Genitalia:       Extremities:   Moves all extremities well, no edema, no cyanosis, no              redness   Pulses:   Pulses palpable and equal bilaterally   Skin:   No bleeding, bruising or rash   Lymph nodes:   No palpable adenopathy   Neurologic:   Grossly intact, alert and oriented       Results Review:   Lab Results (last 24 hours)     Procedure Component Value Units Date/Time    Basic Metabolic Panel [[id number]]  (Abnormal) Collected: 10/09/21 0716    Specimen: Blood Updated: 10/09/21 0840     Glucose 101 mg/dL      BUN 24 mg/dL      Creatinine 1.21 mg/dL      Sodium 135 mmol/L       Potassium 4.4 mmol/L      Chloride 104 mmol/L      CO2 24.9 mmol/L      Calcium 8.8 mg/dL      eGFR Non African Amer 56 mL/min/1.73      BUN/Creatinine Ratio 19.8     Anion Gap 6.1 mmol/L     Narrative:      GFR Normal >60  Chronic Kidney Disease <60  Kidney Failure <15      CBC & Differential [[id number]]  (Abnormal) Collected: 10/09/21 0716    Specimen: Blood Updated: 10/09/21 0834    Narrative:      The following orders were created for panel order CBC & Differential.  Procedure                               Abnormality         Status                     ---------                               -----------         ------                     CBC Auto Differential[[id number]]        Abnormal            Final result                 Please view results for these tests on the individual orders.    CBC Auto Differential [[id number]]  (Abnormal) Collected: 10/09/21 0716    Specimen: Blood Updated: 10/09/21 0834     WBC 6.46 10*3/mm3      RBC 2.67 10*6/mm3      Hemoglobin 8.5 g/dL      Hematocrit 25.6 %      MCV 95.9 fL      MCH 31.8 pg      MCHC 33.2 g/dL      RDW 14.3 %      RDW-SD 50.3 fl      MPV 9.7 fL      Platelets 183 10*3/mm3      Neutrophil % 69.2 %      Lymphocyte % 14.4 %      Monocyte % 11.1 %      Eosinophil % 4.0 %      Basophil % 0.5 %      Immature Grans % 0.8 %      Neutrophils, Absolute 4.47 10*3/mm3      Lymphocytes, Absolute 0.93 10*3/mm3      Monocytes, Absolute 0.72 10*3/mm3      Eosinophils, Absolute 0.26 10*3/mm3      Basophils, Absolute 0.03 10*3/mm3      Immature Grans, Absolute 0.05 10*3/mm3      nRBC 0.0 /100 WBC     Blood Culture - Blood, Arm, Left [[id number]] Collected: 10/06/21 1648    Specimen: Blood from Arm, Left Updated: 10/08/21 1715     Blood Culture No growth at 2 days    Blood Culture - Blood, Arm, Right [[id number]] Collected: 10/06/21 1657    Specimen: Blood from Arm, Right Updated: 10/08/21 1715     Blood Culture No growth at 2 days           ROS:   Gen: Afeb, VSS, NAD  HEENT:  NCAT, normal conjunctiva  Psych: normal mood, normal affect  Resp: non labored breathing  CV: RRR, no c/c/e  Abd: soft/nt/nd  Ext: moves all extremities well, no calf tenderness  Skin: no rashes or lesions on exposed skin.   : pt voiding per urinal    Past Medical History:   Diagnosis Date   • Cancer (HCC)     lt ear    • CHF (congestive heart failure) (HCC)    • Deaf, left     Partial hearing Rt ear    • Fractures     collar bone, vertebrae   • Kidney stones     40 yrs ago        Past Surgical History:   Procedure Laterality Date   • CATARACT EXTRACTION Bilateral    • CORONARY ARTERY BYPASS GRAFT      4 vessel-30 yrs ago    • CYSTOSCOPY WITH CLOT EVACUATION N/A 10/6/2021    Procedure: CYSTOSCOPY WITH CLOT EVACUATION, FULGURATION OF BLEEDING, & CATHETER PLACEMENT;  Surgeon: Mikal Collier Jr., MD;  Location: [facility];  Service: Urology;  Laterality: N/A;   • TONSILLECTOMY         No current facility-administered medications on file prior to encounter.     Current Outpatient Medications on File Prior to Encounter   Medication Sig Dispense Refill   • DANDELION PO Take  by mouth 3 (Three) Times a Day.     • digoxin (LANOXIN) 125 MCG tablet Take 125 mcg by mouth Daily With Lunch.     • furosemide (LASIX) 20 MG tablet Take 20 mg by mouth Daily As Needed.     • Ginkgo Biloba (GINKOBA PO) Take 2 capsules by mouth Daily.     • HAWTHORN PO Take  by mouth 3 (Three) Times a Day.     • latanoprost (XALATAN) 0.005 % ophthalmic solution 1 drop Every Night.     • Nutritional Supplements (GRAPESEED EXTRACT PO) Take  by mouth Daily.     • tamsulosin (FLOMAX) 0.4 MG capsule 24 hr capsule Take 1 capsule by mouth Every Night.     • acetaminophen (TYLENOL) 500 MG chewable tablet Chew 500 mg.     • ferrous sulfate 325 (65 FE) MG tablet Take 325 mg by mouth Daily With Breakfast.         Current Facility-Administered Medications   Medication Dose Route Frequency Provider Last Rate Last Admin   • acetaminophen (TYLENOL) tablet  650 mg  650 mg Oral Q4H PRN Alen Murcia MD   650 mg at 10/06/21 2328   • cephalexin (KEFLEX) capsule 500 mg  500 mg Oral TID Alen Murcia MD       • digoxin (LANOXIN) tablet 125 mcg  125 mcg Oral Daily With Lunch Mikal Collier Jr., MD   125 mcg at 10/09/21 1327   • famotidine (PEPCID) tablet 20 mg  20 mg Oral BID Mikal Collier Jr., MD   20 mg at 10/08/21 2058   • ferrous sulfate tablet 325 mg  325 mg Oral Daily With Breakfast Mikal Collier Jr., MD   325 mg at 10/09/21 0910   • latanoprost (XALATAN) 0.005 % ophthalmic solution 1 drop  1 drop Both Eyes Nightly Mikal Collier Jr., MD   1 drop at 10/08/21 2059   • tamsulosin (FLOMAX) 24 hr capsule 0.4 mg  0.4 mg Oral Nightly Mikal Collier Jr., MD   0.4 mg at 10/08/21 2058       Allergies   Allergen Reactions   • Sulfa Antibiotics Hives       Social History     Socioeconomic History   • Marital status:    Tobacco Use   • Smoking status: Former Smoker   • Smokeless tobacco: Never Used   Vaping Use   • Vaping Use: Never used   Substance and Sexual Activity   • Alcohol use: Not Currently   • Drug use: Not Currently   • Sexual activity: Not Currently       History reviewed. No pertinent family history.    Labs and imaging reviewed  Pertinent in HPI    Assessment:  S/p clot evacuation    PLAN:   Discharge home with jenkins  followup in 2 weeks as OP with JEFF Carvajal   [facility]  [phone]

## 2021-10-09 NOTE — DISCHARGE SUMMARY
Discharge summary    Date of admission 10/3/2021  Date of discharge 10/9/2021    Final diagnosis  Acute MSSA UTI  Hematuria s/p cystoscopy with clot evacuation and cauterization  BPH and elevated PSA  Chronic anemia   Acute kidney injury resolved  Chronic kidney disease stage III  Urinary retention with Dexter cath  Gastroesophageal flux disease    Discharge medications    Current Facility-Administered Medications:   •  acetaminophen (TYLENOL) tablet 650 mg, 650 mg, Oral, Q4H PRN, Alen Murcia MD, 650 mg at 10/06/21 2328  •  cephalexin (KEFLEX) capsule 500 mg, 500 mg, Oral, TID, Alen Murcia MD  •  digoxin (LANOXIN) tablet 125 mcg, 125 mcg, Oral, Daily With Lunch, Mikal Collier Jr., MD, 125 mcg at 10/09/21 1327  •  famotidine (PEPCID) tablet 20 mg, 20 mg, Oral, BID, Mikal Collier Jr., MD, 20 mg at 10/08/21 2058  •  ferrous sulfate tablet 325 mg, 325 mg, Oral, Daily With Breakfast, Mikal Collier Jr., MD, 325 mg at 10/09/21 0910  •  latanoprost (XALATAN) 0.005 % ophthalmic solution 1 drop, 1 drop, Both Eyes, Nightly, Mikal Collier Jr., MD, 1 drop at 10/08/21 2059  •  tamsulosin (FLOMAX) 24 hr capsule 0.4 mg, 0.4 mg, Oral, Nightly, Mikal Collier Jr., MD, 0.4 mg at 10/08/21 2058     Consults obtained  Urology  Nephrology  Infectious disease    Procedures  Cystoscopy with clot evacuation and cauterization    Hospital course  89-year white male with history of BPH chronic anemia osteoarthritis and gastroesophageal disease admitted to emergency room with bloody urine.  Patient evaluated in ER found to have acute UTI with acute kidney injury and hematuria admit for management.  Patient admitted and Dexter catheter in place and treated with fluids and IV antibiotics and urology consult obtained.  Patient underwent cystoscopy with clot evacuation and cauterization.  Patient remained on CBI and then Dexter catheter removed but he recurrent urinary retention and Dexter catheter replaced and CBI restarted.   This morning patient has no more hematuria and wants to go home and urology recommend he can go home with Dexter catheter.  Patient UTI showed methicillin sensitive staph aureus and his antibiotics changed to cefazolin and at the time of discharge on Keflex 500 3 times daily for 10 more days.  Patient 3 daughters and his wife are nurses and wants to take him with Dexter catheter with family support and home health which is arranged.  Patient kidney function improved with hydration and his BUN is 24 and creatinine 1.2.  Patient hemoglobin 8.5 at the time of discharge.  Patient remained DNR throughout hospital course    Discharge diet regular    Activity as tolerated    Medication as above    Follow-up with primary doctor in 1 week and follow-up with urology and nephrology per the instruction and take medication as directed    DARRYL PEREZ MD

## 2021-10-09 NOTE — OUTREACH NOTE
Prep Survey      Responses   [facility] patient discharged from? [facility]   Is LACE score < 7 ? Yes   Emergency Room discharge w/ pulse ox? No   Eligibility Not Eligible   What are the reasons patient is not eligible? Other  [Low lace score]   Does the patient have one of the following disease processes/diagnoses(primary or secondary)? Other   Prep survey completed? Yes          Alesha Velez RN

## 2021-10-10 NOTE — NURSING NOTE
Family called this RN after pt's discharge to notify about pt's pharmacy being closed. RN reached out to admitting MD and MD unable to change prescription to different pharmacy, call placed to ID MD, awaiting phone call. Family updated on status of situation. Note left with charge RN to update family if prescriptions can be changed pharmacy.

## 2021-10-11 LAB
BACTERIA SPEC AEROBE CULT: NORMAL
BACTERIA SPEC AEROBE CULT: NORMAL

## 2021-10-11 NOTE — CASE MANAGEMENT/SOCIAL WORK
Case Management Discharge Note      Final Note: Home with family. [facility].    Provided Post Acute Provider List?: N/A  N/A Provider List Comment: no needs at this time    Selected Continued Care - Discharged on 10/9/2021 Admission date: 10/3/2021 - Discharge disposition: Home or Self Care    Destination    No services have been selected for the patient.              Durable Medical Equipment    No services have been selected for the patient.              Dialysis/Infusion    No services have been selected for the patient.              Home Medical Care    No services have been selected for the patient.              Therapy    No services have been selected for the patient.              Community Resources    No services have been selected for the patient.              Community & DME    No services have been selected for the patient.                       Final Discharge Disposition Code: 06 - home with home health care

## 2022-02-16 ENCOUNTER — HOSPITAL ENCOUNTER (OUTPATIENT)
Facility: HOSPITAL | Age: 87
Discharge: HOME | End: 2022-02-16
Attending: EMERGENCY MEDICINE | Admitting: INTERNAL MEDICINE

## 2022-02-16 ENCOUNTER — APPOINTMENT (OUTPATIENT)
Dept: GENERAL RADIOLOGY | Facility: HOSPITAL | Age: 87
End: 2022-02-16

## 2022-02-16 ENCOUNTER — READMISSION MANAGEMENT (OUTPATIENT)
Dept: CALL CENTER | Facility: HOSPITAL | Age: 87
End: 2022-02-16

## 2022-02-16 ENCOUNTER — ANESTHESIA (OUTPATIENT)
Dept: PERIOP | Facility: HOSPITAL | Age: 87
End: 2022-02-16

## 2022-02-16 ENCOUNTER — ANESTHESIA EVENT (OUTPATIENT)
Dept: PERIOP | Facility: HOSPITAL | Age: 87
End: 2022-02-16

## 2022-02-16 VITALS
TEMPERATURE: 97 F | BODY MASS INDEX: 21.05 KG/M2 | HEART RATE: 79 BPM | SYSTOLIC BLOOD PRESSURE: 139 MMHG | WEIGHT: 138.89 LBS | RESPIRATION RATE: 18 BRPM | DIASTOLIC BLOOD PRESSURE: 72 MMHG | OXYGEN SATURATION: 94 % | HEIGHT: 68 IN

## 2022-02-16 DIAGNOSIS — I50.9 CONGESTIVE HEART FAILURE, UNSPECIFIED HF CHRONICITY, UNSPECIFIED HEART FAILURE TYPE: ICD-10-CM

## 2022-02-16 DIAGNOSIS — Z86.79 HISTORY OF CORONARY ARTERY DISEASE: ICD-10-CM

## 2022-02-16 DIAGNOSIS — K22.2 ESOPHAGEAL OBSTRUCTION: Primary | ICD-10-CM

## 2022-02-16 DIAGNOSIS — T18.128A FOOD IMPACTION OF ESOPHAGUS: ICD-10-CM

## 2022-02-16 LAB
ALBUMIN SERPL-MCNC: 3.2 G/DL (ref 3.5–5.2)
ALBUMIN/GLOB SERPL: 0.9 G/DL
ALP SERPL-CCNC: 59 U/L (ref 39–117)
ALT SERPL W P-5'-P-CCNC: 21 U/L (ref 1–41)
ANION GAP SERPL CALCULATED.3IONS-SCNC: 10 MMOL/L (ref 5–15)
AST SERPL-CCNC: 34 U/L (ref 1–40)
BILIRUB SERPL-MCNC: 0.8 MG/DL (ref 0–1.2)
BUN SERPL-MCNC: 31 MG/DL (ref 8–23)
BUN/CREAT SERPL: 22.6 (ref 7–25)
CALCIUM SPEC-SCNC: 9 MG/DL (ref 8.2–9.6)
CHLORIDE SERPL-SCNC: 99 MMOL/L (ref 98–107)
CO2 SERPL-SCNC: 26 MMOL/L (ref 22–29)
CREAT SERPL-MCNC: 1.37 MG/DL (ref 0.76–1.27)
DEPRECATED RDW RBC AUTO: 45.4 FL (ref 37–54)
ERYTHROCYTE [DISTWIDTH] IN BLOOD BY AUTOMATED COUNT: 13.7 % (ref 12.3–15.4)
FERRITIN SERPL-MCNC: 800 NG/ML (ref 30–400)
GFR SERPL CREATININE-BSD FRML MDRD: 48 ML/MIN/1.73
GLOBULIN UR ELPH-MCNC: 3.5 GM/DL
GLUCOSE SERPL-MCNC: 105 MG/DL (ref 65–99)
HCT VFR BLD AUTO: 39.1 % (ref 37.5–51)
HGB BLD-MCNC: 12.8 G/DL (ref 13–17.7)
LYMPHOCYTES # BLD MANUAL: 1.19 10*3/MM3 (ref 0.7–3.1)
LYMPHOCYTES NFR BLD MANUAL: 5.1 % (ref 5–12)
MCH RBC QN AUTO: 30 PG (ref 26.6–33)
MCHC RBC AUTO-ENTMCNC: 32.7 G/DL (ref 31.5–35.7)
MCV RBC AUTO: 91.8 FL (ref 79–97)
MONOCYTES # BLD: 0.19 10*3/MM3 (ref 0.1–0.9)
NEUTROPHILS # BLD AUTO: 2.37 10*3/MM3 (ref 1.7–7)
NEUTROPHILS NFR BLD MANUAL: 63.3 % (ref 42.7–76)
PLAT MORPH BLD: NORMAL
PLATELET # BLD AUTO: 193 10*3/MM3 (ref 140–450)
PMV BLD AUTO: 9.9 FL (ref 6–12)
POIKILOCYTOSIS BLD QL SMEAR: NORMAL
POTASSIUM SERPL-SCNC: 4.7 MMOL/L (ref 3.5–5.2)
PROT SERPL-MCNC: 6.7 G/DL (ref 6–8.5)
RBC # BLD AUTO: 4.26 10*6/MM3 (ref 4.14–5.8)
SARS-COV-2 RNA PNL SPEC NAA+PROBE: DETECTED
SODIUM SERPL-SCNC: 135 MMOL/L (ref 136–145)
VARIANT LYMPHS NFR BLD MANUAL: 1 % (ref 0–5)
VARIANT LYMPHS NFR BLD MANUAL: 30.6 % (ref 19.6–45.3)
WBC MORPH BLD: NORMAL
WBC NRBC COR # BLD: 3.75 10*3/MM3 (ref 3.4–10.8)

## 2022-02-16 PROCEDURE — 96375 TX/PRO/DX INJ NEW DRUG ADDON: CPT

## 2022-02-16 PROCEDURE — G0378 HOSPITAL OBSERVATION PER HR: HCPCS

## 2022-02-16 PROCEDURE — 25010000002 DEXAMETHASONE PER 1 MG: Performed by: INTERNAL MEDICINE

## 2022-02-16 PROCEDURE — A9270 NON-COVERED ITEM OR SERVICE: HCPCS | Performed by: HOSPITALIST

## 2022-02-16 PROCEDURE — 43450 DILATE ESOPHAGUS 1/MULT PASS: CPT | Performed by: INTERNAL MEDICINE

## 2022-02-16 PROCEDURE — 88305 TISSUE EXAM BY PATHOLOGIST: CPT | Performed by: INTERNAL MEDICINE

## 2022-02-16 PROCEDURE — 43239 EGD BIOPSY SINGLE/MULTIPLE: CPT | Performed by: INTERNAL MEDICINE

## 2022-02-16 PROCEDURE — 99284 EMERGENCY DEPT VISIT MOD MDM: CPT

## 2022-02-16 PROCEDURE — 85007 BL SMEAR W/DIFF WBC COUNT: CPT | Performed by: PHYSICIAN ASSISTANT

## 2022-02-16 PROCEDURE — 25010000002 PHENYLEPHRINE 10 MG/ML SOLUTION: Performed by: NURSE ANESTHETIST, CERTIFIED REGISTERED

## 2022-02-16 PROCEDURE — 87635 SARS-COV-2 COVID-19 AMP PRB: CPT | Performed by: PHYSICIAN ASSISTANT

## 2022-02-16 PROCEDURE — 63710000001 VITAMIN C 500 MG TABLET: Performed by: HOSPITALIST

## 2022-02-16 PROCEDURE — 85025 COMPLETE CBC W/AUTO DIFF WBC: CPT | Performed by: PHYSICIAN ASSISTANT

## 2022-02-16 PROCEDURE — 25010000002 PROPOFOL 10 MG/ML EMULSION: Performed by: NURSE ANESTHETIST, CERTIFIED REGISTERED

## 2022-02-16 PROCEDURE — 25010000002 GLUCAGON (HUMAN RECOMBINANT) 1 MG RECONSTITUTED SOLUTION: Performed by: PHYSICIAN ASSISTANT

## 2022-02-16 PROCEDURE — 80053 COMPREHEN METABOLIC PANEL: CPT | Performed by: PHYSICIAN ASSISTANT

## 2022-02-16 PROCEDURE — 71045 X-RAY EXAM CHEST 1 VIEW: CPT

## 2022-02-16 PROCEDURE — 36415 COLL VENOUS BLD VENIPUNCTURE: CPT

## 2022-02-16 PROCEDURE — 25010000002 METHYLPREDNISOLONE PER 40 MG: Performed by: PHYSICIAN ASSISTANT

## 2022-02-16 PROCEDURE — 63710000001 DIGOXIN 250 MCG TABLET: Performed by: INTERNAL MEDICINE

## 2022-02-16 PROCEDURE — 99204 OFFICE O/P NEW MOD 45 MIN: CPT | Performed by: INTERNAL MEDICINE

## 2022-02-16 PROCEDURE — 99283 EMERGENCY DEPT VISIT LOW MDM: CPT

## 2022-02-16 PROCEDURE — C9803 HOPD COVID-19 SPEC COLLECT: HCPCS

## 2022-02-16 PROCEDURE — 25010000002 ONDANSETRON PER 1 MG: Performed by: NURSE ANESTHETIST, CERTIFIED REGISTERED

## 2022-02-16 PROCEDURE — 43247 EGD REMOVE FOREIGN BODY: CPT | Performed by: INTERNAL MEDICINE

## 2022-02-16 PROCEDURE — 36415 COLL VENOUS BLD VENIPUNCTURE: CPT | Performed by: PHYSICIAN ASSISTANT

## 2022-02-16 PROCEDURE — 96374 THER/PROPH/DIAG INJ IV PUSH: CPT

## 2022-02-16 PROCEDURE — 25010000002 ONDANSETRON PER 1 MG: Performed by: PHYSICIAN ASSISTANT

## 2022-02-16 PROCEDURE — A9270 NON-COVERED ITEM OR SERVICE: HCPCS | Performed by: INTERNAL MEDICINE

## 2022-02-16 PROCEDURE — 25010000002 ENOXAPARIN PER 10 MG: Performed by: INTERNAL MEDICINE

## 2022-02-16 PROCEDURE — 63710000001 CHOLECALCIFEROL 25 MCG (1000 UT) TABLET: Performed by: HOSPITALIST

## 2022-02-16 PROCEDURE — 25010000002 SUCCINYLCHOLINE PER 20 MG: Performed by: NURSE ANESTHETIST, CERTIFIED REGISTERED

## 2022-02-16 PROCEDURE — 63710000001 CETIRIZINE 10 MG TABLET: Performed by: HOSPITALIST

## 2022-02-16 PROCEDURE — 63710000001 ZINC SULFATE 220 (50 ZN) MG CAPSULE: Performed by: HOSPITALIST

## 2022-02-16 PROCEDURE — 82728 ASSAY OF FERRITIN: CPT | Performed by: HOSPITALIST

## 2022-02-16 RX ORDER — ONDANSETRON 2 MG/ML
4 INJECTION INTRAMUSCULAR; INTRAVENOUS ONCE
Status: COMPLETED | OUTPATIENT
Start: 2022-02-16 | End: 2022-02-16

## 2022-02-16 RX ORDER — DEXAMETHASONE SODIUM PHOSPHATE 10 MG/ML
6 INJECTION INTRAMUSCULAR; INTRAVENOUS DAILY
Status: DISCONTINUED | OUTPATIENT
Start: 2022-02-16 | End: 2022-02-16 | Stop reason: HOSPADM

## 2022-02-16 RX ORDER — ZINC SULFATE 50(220)MG
220 CAPSULE ORAL DAILY
Status: DISCONTINUED | OUTPATIENT
Start: 2022-02-16 | End: 2022-02-16 | Stop reason: HOSPADM

## 2022-02-16 RX ORDER — GUAIFENESIN 600 MG/1
600 TABLET, EXTENDED RELEASE ORAL EVERY 12 HOURS SCHEDULED
Status: DISCONTINUED | OUTPATIENT
Start: 2022-02-16 | End: 2022-02-16 | Stop reason: HOSPADM

## 2022-02-16 RX ORDER — BUDESONIDE AND FORMOTEROL FUMARATE DIHYDRATE 160; 4.5 UG/1; UG/1
2 AEROSOL RESPIRATORY (INHALATION)
Status: DISCONTINUED | OUTPATIENT
Start: 2022-02-16 | End: 2022-02-16 | Stop reason: HOSPADM

## 2022-02-16 RX ORDER — SODIUM CHLORIDE 9 MG/ML
75 INJECTION, SOLUTION INTRAVENOUS CONTINUOUS
Status: DISCONTINUED | OUTPATIENT
Start: 2022-02-16 | End: 2022-02-16 | Stop reason: HOSPADM

## 2022-02-16 RX ORDER — LABETALOL HYDROCHLORIDE 5 MG/ML
5 INJECTION, SOLUTION INTRAVENOUS
Status: DISCONTINUED | OUTPATIENT
Start: 2022-02-16 | End: 2022-02-16 | Stop reason: HOSPADM

## 2022-02-16 RX ORDER — ALUMINA, MAGNESIA, AND SIMETHICONE 2400; 2400; 240 MG/30ML; MG/30ML; MG/30ML
15 SUSPENSION ORAL ONCE
Status: COMPLETED | OUTPATIENT
Start: 2022-02-16 | End: 2022-02-16

## 2022-02-16 RX ORDER — NALOXONE HCL 0.4 MG/ML
0.2 VIAL (ML) INJECTION AS NEEDED
Status: DISCONTINUED | OUTPATIENT
Start: 2022-02-16 | End: 2022-02-16 | Stop reason: HOSPADM

## 2022-02-16 RX ORDER — ASCORBIC ACID 500 MG
500 TABLET ORAL DAILY
Status: DISCONTINUED | OUTPATIENT
Start: 2022-02-16 | End: 2022-02-16 | Stop reason: HOSPADM

## 2022-02-16 RX ORDER — ONDANSETRON 2 MG/ML
INJECTION INTRAMUSCULAR; INTRAVENOUS
Status: ACTIVE
Start: 2022-02-16 | End: 2022-02-16

## 2022-02-16 RX ORDER — ALBUTEROL SULFATE 2.5 MG/3ML
2.5 SOLUTION RESPIRATORY (INHALATION) EVERY 6 HOURS PRN
Status: DISCONTINUED | OUTPATIENT
Start: 2022-02-16 | End: 2022-02-16 | Stop reason: HOSPADM

## 2022-02-16 RX ORDER — SODIUM CHLORIDE, SODIUM LACTATE, POTASSIUM CHLORIDE, CALCIUM CHLORIDE 600; 310; 30; 20 MG/100ML; MG/100ML; MG/100ML; MG/100ML
INJECTION, SOLUTION INTRAVENOUS CONTINUOUS PRN
Status: DISCONTINUED | OUTPATIENT
Start: 2022-02-16 | End: 2022-02-16 | Stop reason: SURG

## 2022-02-16 RX ORDER — ONDANSETRON 2 MG/ML
4 INJECTION INTRAMUSCULAR; INTRAVENOUS EVERY 6 HOURS PRN
Status: DISCONTINUED | OUTPATIENT
Start: 2022-02-16 | End: 2022-02-16 | Stop reason: HOSPADM

## 2022-02-16 RX ORDER — LATANOPROST 50 UG/ML
1 SOLUTION/ DROPS OPHTHALMIC NIGHTLY
Status: DISCONTINUED | OUTPATIENT
Start: 2022-02-16 | End: 2022-02-16 | Stop reason: HOSPADM

## 2022-02-16 RX ORDER — FLUMAZENIL 0.1 MG/ML
0.2 INJECTION INTRAVENOUS AS NEEDED
Status: DISCONTINUED | OUTPATIENT
Start: 2022-02-16 | End: 2022-02-16 | Stop reason: HOSPADM

## 2022-02-16 RX ORDER — LIDOCAINE HYDROCHLORIDE 20 MG/ML
INJECTION, SOLUTION INFILTRATION; PERINEURAL AS NEEDED
Status: DISCONTINUED | OUTPATIENT
Start: 2022-02-16 | End: 2022-02-16 | Stop reason: SURG

## 2022-02-16 RX ORDER — DIPHENHYDRAMINE HCL 25 MG
25 CAPSULE ORAL
Status: DISCONTINUED | OUTPATIENT
Start: 2022-02-16 | End: 2022-02-16 | Stop reason: HOSPADM

## 2022-02-16 RX ORDER — PROPOFOL 10 MG/ML
VIAL (ML) INTRAVENOUS AS NEEDED
Status: DISCONTINUED | OUTPATIENT
Start: 2022-02-16 | End: 2022-02-16 | Stop reason: SURG

## 2022-02-16 RX ORDER — ONDANSETRON 2 MG/ML
INJECTION INTRAMUSCULAR; INTRAVENOUS AS NEEDED
Status: DISCONTINUED | OUTPATIENT
Start: 2022-02-16 | End: 2022-02-16 | Stop reason: SURG

## 2022-02-16 RX ORDER — PROMETHAZINE HYDROCHLORIDE 25 MG/1
25 TABLET ORAL ONCE AS NEEDED
Status: DISCONTINUED | OUTPATIENT
Start: 2022-02-16 | End: 2022-02-16 | Stop reason: HOSPADM

## 2022-02-16 RX ORDER — HYDROMORPHONE HYDROCHLORIDE 1 MG/ML
0.5 INJECTION, SOLUTION INTRAMUSCULAR; INTRAVENOUS; SUBCUTANEOUS
Status: DISCONTINUED | OUTPATIENT
Start: 2022-02-16 | End: 2022-02-16 | Stop reason: HOSPADM

## 2022-02-16 RX ORDER — HYDRALAZINE HYDROCHLORIDE 20 MG/ML
5 INJECTION INTRAMUSCULAR; INTRAVENOUS
Status: DISCONTINUED | OUTPATIENT
Start: 2022-02-16 | End: 2022-02-16 | Stop reason: HOSPADM

## 2022-02-16 RX ORDER — METHYLPREDNISOLONE SODIUM SUCCINATE 40 MG/ML
20 INJECTION, POWDER, LYOPHILIZED, FOR SOLUTION INTRAMUSCULAR; INTRAVENOUS ONCE
Status: COMPLETED | OUTPATIENT
Start: 2022-02-16 | End: 2022-02-16

## 2022-02-16 RX ORDER — OXYCODONE AND ACETAMINOPHEN 7.5; 325 MG/1; MG/1
1 TABLET ORAL EVERY 4 HOURS PRN
Status: DISCONTINUED | OUTPATIENT
Start: 2022-02-16 | End: 2022-02-16 | Stop reason: HOSPADM

## 2022-02-16 RX ORDER — MELATONIN
1000 DAILY
Status: DISCONTINUED | OUTPATIENT
Start: 2022-02-16 | End: 2022-02-16 | Stop reason: HOSPADM

## 2022-02-16 RX ORDER — PANTOPRAZOLE SODIUM 40 MG/10ML
40 INJECTION, POWDER, LYOPHILIZED, FOR SOLUTION INTRAVENOUS
Status: DISCONTINUED | OUTPATIENT
Start: 2022-02-16 | End: 2022-02-16

## 2022-02-16 RX ORDER — HYDROCODONE BITARTRATE AND ACETAMINOPHEN 7.5; 325 MG/1; MG/1
1 TABLET ORAL ONCE AS NEEDED
Status: DISCONTINUED | OUTPATIENT
Start: 2022-02-16 | End: 2022-02-16 | Stop reason: HOSPADM

## 2022-02-16 RX ORDER — PREDNISONE 1 MG/1
5 TABLET ORAL 3 TIMES DAILY
COMMUNITY

## 2022-02-16 RX ORDER — CETIRIZINE HYDROCHLORIDE 10 MG/1
5 TABLET ORAL DAILY
Status: DISCONTINUED | OUTPATIENT
Start: 2022-02-16 | End: 2022-02-16 | Stop reason: HOSPADM

## 2022-02-16 RX ORDER — PANTOPRAZOLE SODIUM 40 MG/1
40 TABLET, DELAYED RELEASE ORAL
Status: DISCONTINUED | OUTPATIENT
Start: 2022-02-17 | End: 2022-02-16 | Stop reason: HOSPADM

## 2022-02-16 RX ORDER — GUAIFENESIN 600 MG/1
600 TABLET, EXTENDED RELEASE ORAL EVERY 12 HOURS SCHEDULED
Qty: 60 TABLET | Refills: 0 | Status: SHIPPED | OUTPATIENT
Start: 2022-02-16 | End: 2022-03-18

## 2022-02-16 RX ORDER — DIGOXIN 250 MCG
125 TABLET ORAL
Status: DISCONTINUED | OUTPATIENT
Start: 2022-02-16 | End: 2022-02-16 | Stop reason: HOSPADM

## 2022-02-16 RX ORDER — EPHEDRINE SULFATE 50 MG/ML
5 INJECTION, SOLUTION INTRAVENOUS ONCE AS NEEDED
Status: DISCONTINUED | OUTPATIENT
Start: 2022-02-16 | End: 2022-02-16 | Stop reason: HOSPADM

## 2022-02-16 RX ORDER — LIDOCAINE HYDROCHLORIDE 20 MG/ML
15 SOLUTION OROPHARYNGEAL ONCE
Status: DISCONTINUED | OUTPATIENT
Start: 2022-02-16 | End: 2022-02-16

## 2022-02-16 RX ORDER — ONDANSETRON 2 MG/ML
4 INJECTION INTRAMUSCULAR; INTRAVENOUS ONCE AS NEEDED
Status: DISCONTINUED | OUTPATIENT
Start: 2022-02-16 | End: 2022-02-16 | Stop reason: HOSPADM

## 2022-02-16 RX ORDER — DIPHENHYDRAMINE HYDROCHLORIDE 50 MG/ML
12.5 INJECTION INTRAMUSCULAR; INTRAVENOUS
Status: DISCONTINUED | OUTPATIENT
Start: 2022-02-16 | End: 2022-02-16 | Stop reason: HOSPADM

## 2022-02-16 RX ORDER — PROMETHAZINE HYDROCHLORIDE 25 MG/1
25 SUPPOSITORY RECTAL ONCE AS NEEDED
Status: DISCONTINUED | OUTPATIENT
Start: 2022-02-16 | End: 2022-02-16 | Stop reason: HOSPADM

## 2022-02-16 RX ORDER — LIDOCAINE HYDROCHLORIDE 20 MG/ML
15 SOLUTION OROPHARYNGEAL ONCE
Status: COMPLETED | OUTPATIENT
Start: 2022-02-16 | End: 2022-02-16

## 2022-02-16 RX ORDER — PANTOPRAZOLE SODIUM 40 MG/1
40 TABLET, DELAYED RELEASE ORAL
Qty: 30 TABLET | Refills: 0 | Status: SHIPPED | OUTPATIENT
Start: 2022-02-17 | End: 2022-03-19

## 2022-02-16 RX ORDER — IBUPROFEN 600 MG/1
600 TABLET ORAL ONCE AS NEEDED
Status: DISCONTINUED | OUTPATIENT
Start: 2022-02-16 | End: 2022-02-16 | Stop reason: HOSPADM

## 2022-02-16 RX ORDER — TAMSULOSIN HYDROCHLORIDE 0.4 MG/1
0.4 CAPSULE ORAL NIGHTLY
Status: DISCONTINUED | OUTPATIENT
Start: 2022-02-16 | End: 2022-02-16 | Stop reason: HOSPADM

## 2022-02-16 RX ORDER — FENTANYL CITRATE 50 UG/ML
50 INJECTION, SOLUTION INTRAMUSCULAR; INTRAVENOUS
Status: DISCONTINUED | OUTPATIENT
Start: 2022-02-16 | End: 2022-02-16 | Stop reason: HOSPADM

## 2022-02-16 RX ORDER — ALUMINA, MAGNESIA, AND SIMETHICONE 2400; 2400; 240 MG/30ML; MG/30ML; MG/30ML
15 SUSPENSION ORAL ONCE
Status: DISCONTINUED | OUTPATIENT
Start: 2022-02-16 | End: 2022-02-16

## 2022-02-16 RX ORDER — DOXYCYCLINE HYCLATE 100 MG/1
100 CAPSULE ORAL 2 TIMES DAILY
COMMUNITY

## 2022-02-16 RX ORDER — PHENYLEPHRINE HYDROCHLORIDE 10 MG/ML
INJECTION INTRAVENOUS AS NEEDED
Status: DISCONTINUED | OUTPATIENT
Start: 2022-02-16 | End: 2022-02-16 | Stop reason: SURG

## 2022-02-16 RX ORDER — SUCCINYLCHOLINE CHLORIDE 20 MG/ML
INJECTION INTRAMUSCULAR; INTRAVENOUS AS NEEDED
Status: DISCONTINUED | OUTPATIENT
Start: 2022-02-16 | End: 2022-02-16 | Stop reason: SURG

## 2022-02-16 RX ADMIN — SUCCINYLCHOLINE CHLORIDE 140 MG: 20 INJECTION, SOLUTION INTRAMUSCULAR; INTRAVENOUS; PARENTERAL at 10:48

## 2022-02-16 RX ADMIN — Medication 220 MG: at 15:59

## 2022-02-16 RX ADMIN — SODIUM CHLORIDE, POTASSIUM CHLORIDE, SODIUM LACTATE AND CALCIUM CHLORIDE: 600; 310; 30; 20 INJECTION, SOLUTION INTRAVENOUS at 10:30

## 2022-02-16 RX ADMIN — DEXAMETHASONE SODIUM PHOSPHATE 6 MG: 10 INJECTION INTRAMUSCULAR; INTRAVENOUS at 15:58

## 2022-02-16 RX ADMIN — PROPOFOL 150 MG: 10 INJECTION, EMULSION INTRAVENOUS at 10:48

## 2022-02-16 RX ADMIN — GLUCAGON HYDROCHLORIDE 1 MG: KIT at 01:54

## 2022-02-16 RX ADMIN — CETIRIZINE HYDROCHLORIDE 5 MG: 10 TABLET ORAL at 15:59

## 2022-02-16 RX ADMIN — OXYCODONE HYDROCHLORIDE AND ACETAMINOPHEN 500 MG: 500 TABLET ORAL at 16:00

## 2022-02-16 RX ADMIN — METHYLPREDNISOLONE SODIUM SUCCINATE 20 MG: 40 INJECTION, POWDER, FOR SOLUTION INTRAMUSCULAR; INTRAVENOUS at 03:18

## 2022-02-16 RX ADMIN — ONDANSETRON 4 MG: 2 INJECTION INTRAMUSCULAR; INTRAVENOUS at 11:10

## 2022-02-16 RX ADMIN — PHENYLEPHRINE HYDROCHLORIDE 100 MCG: 10 INJECTION, SOLUTION INTRAVENOUS at 11:12

## 2022-02-16 RX ADMIN — LIDOCAINE HYDROCHLORIDE 15 ML: 20 SOLUTION ORAL; TOPICAL at 01:36

## 2022-02-16 RX ADMIN — DIGOXIN 125 MCG: 250 TABLET ORAL at 18:09

## 2022-02-16 RX ADMIN — Medication 1000 UNITS: at 15:59

## 2022-02-16 RX ADMIN — PROPOFOL 80 MCG/KG/MIN: 10 INJECTION, EMULSION INTRAVENOUS at 10:48

## 2022-02-16 RX ADMIN — LIDOCAINE HYDROCHLORIDE 100 MG: 20 INJECTION, SOLUTION INFILTRATION; PERINEURAL at 10:48

## 2022-02-16 RX ADMIN — ONDANSETRON 4 MG: 2 INJECTION INTRAMUSCULAR; INTRAVENOUS at 02:07

## 2022-02-16 RX ADMIN — MAGNESIUM HYDROXIDE,ALUMINUM HYDROXICE,SIMETHICONE 15 ML: 240; 2400; 2400 SUSPENSION ORAL at 01:36

## 2022-02-16 RX ADMIN — ENOXAPARIN SODIUM 30 MG: 30 INJECTION SUBCUTANEOUS at 15:59

## 2022-02-16 NOTE — ANESTHESIA PREPROCEDURE EVALUATION
Anesthesia Evaluation     Patient summary reviewed   NPO Solid Status: > 8 hours             Airway   No difficulty expected  Dental      Pulmonary      ROS comment: Covid+   Cardiovascular     Rhythm: irregular    (+) CABG, dysrhythmias Atrial Fib, CHF ,       Neuro/Psych  GI/Hepatic/Renal/Endo    (+)   renal disease,     ROS Comment: dysphagia    Musculoskeletal     Abdominal    Substance History      OB/GYN          Other      history of cancer remission                  Anesthesia Plan    ASA 3     general       Anesthetic plan, all risks, benefits, and alternatives have been provided, discussed and informed consent has been obtained with: patient.        CODE STATUS:

## 2022-02-16 NOTE — ED NOTES
Pt in mask throughout encounter. This ERT was in appropriate ppe.       Walt Magaña, PCT  02/16/22 0117

## 2022-02-16 NOTE — CONSULTS
[facility]  Initial Inpatient Consult Note    Referring Provider: Dr. Rouse    Reason for Consultation: Esophageal food bolus    Subjective     History of present illness:    89 y.o. male with no significant GI past medical history, no chronic GERD, no previous esophageal food bolus or esophageal obstruction, was eating meat yesterday at dinner 6 PM when he felt the food get lodged in the esophagus, tried to take a pill which also became lodged.  He has been not tolerating his secretions since.  No abdominal pain.  No chest pain.  No shortness of air    Past Medical History:  Past Medical History:   Diagnosis Date   • Cancer (HCC)     lt ear    • CHF (congestive heart failure) (HCC)    • Deaf, left     Partial hearing Rt ear    • Fractures     collar bone, vertebrae   • Kidney stones     40 yrs ago      Past Surgical History:  Past Surgical History:   Procedure Laterality Date   • CATARACT EXTRACTION Bilateral    • CORONARY ARTERY BYPASS GRAFT      4 vessel-30 yrs ago    • CYSTOSCOPY WITH CLOT EVACUATION N/A 10/6/2021    Procedure: CYSTOSCOPY WITH CLOT EVACUATION, FULGURATION OF BLEEDING, & CATHETER PLACEMENT;  Surgeon: Mikal Collier Jr., MD;  Location: [facility];  Service: Urology;  Laterality: N/A;   • TONSILLECTOMY        Social History:   Social History     Tobacco Use   • Smoking status: Former Smoker   • Smokeless tobacco: Never Used   Substance Use Topics   • Alcohol use: Not Currently      Family History:  No family history on file.    Home Meds:  Medications Prior to Admission   Medication Sig Dispense Refill Last Dose   • acetaminophen (TYLENOL) 500 MG chewable tablet Chew 500 mg.   Past Week at Unknown time   • digoxin (LANOXIN) 125 MCG tablet Take 125 mcg by mouth Daily With Lunch.   2/15/2022 at Unknown time   • doxycycline (VIBRAMYCIN) 100 MG capsule Take 100 mg by mouth 2 (Two) Times a Day.   2/15/2022 at Unknown time   • ferrous sulfate 325 (65 FE) MG tablet Take  325 mg by mouth Daily With Breakfast.   Past Month at Unknown time   • latanoprost (XALATAN) 0.005 % ophthalmic solution 1 drop Every Night.   2/15/2022 at Unknown time   • predniSONE (DELTASONE) 5 MG tablet Take 5 mg by mouth 3 (Three) Times a Day.   2/15/2022 at Unknown time   • tamsulosin (FLOMAX) 0.4 MG capsule 24 hr capsule Take 1 capsule by mouth Every Night.   2/15/2022 at Unknown time     Current Meds:     Allergies:  Allergies   Allergen Reactions   • Sulfa Antibiotics Hives     Review of Systems  There is weakness and fatigue all other systems reviewed and negative     Objective     Vital Signs  Temp:  [97 °F (36.1 °C)-98.4 °F (36.9 °C)] 97 °F (36.1 °C)  Heart Rate:  [59-84] 70  Resp:  [16-18] 18  BP: (120-145)/(58-83) 129/81  Physical Exam:  General Appearance:    Alert, cooperative, in no acute distress   Head:    Normocephalic, without obvious abnormality, atraumatic   Eyes:          conjunctivae and sclerae normal, no   icterus   Throat:   no thrush, oral mucosa moist   Neck:   Supple, no adenopathy   Lungs:     Clear to auscultation bilaterally    Heart:    Regular rhythm and normal rate    Chest Wall:    No abnormalities observed   Abdomen:     Soft, nondistended, nontender; normal bowel sounds   Extremities:   no edema, no redness   Skin:   No bruising or rash   Psychiatric:  normal mood and insight     Results Review:   I reviewed the patient's new clinical results.    Results from last 7 days   Lab Units 02/16/22  0131   WBC 10*3/mm3 3.75   HEMOGLOBIN g/dL 12.8*   HEMATOCRIT % 39.1   PLATELETS 10*3/mm3 193           Invalid input(s): LABALBU, PROT      No results found for: LIPASE    Radiology:  XR Chest 1 View   Final Result         Electronically signed by Eduar Zaldivar MD on 02-16-22 at 0217          Assessment/Plan   Patient Active Problem List   Diagnosis   • Gross hematuria   • Esophageal obstruction       Assessment:  1. Esophageal food bolus    Plan:  · I recommend EGD today in the  operating room with general anesthesia, the daughter who is a nurse has not consented yet but would like to talk to the anesthesiologist about possibly MAC or conscious sedation  · We discussed standard of care, with regard to esophageal food obstruction, we discussed morbidity and mortality with regard to general anesthesia in a gentleman at age 89, I am recommending operating room with general anesthesia but I have contacted Dr. Larson of the anesthesiology department and this physician will have a discussion with the daughter.  We will await her decision      I discussed the patients findings and my recommendations with patient and nursing staff.    Vaughn Erazo MD

## 2022-02-16 NOTE — ED NOTES
After pt was given GI Cocktail pts voice became gurgley and pt coughed up thick sputum.      Robert Saenz, RN  02/16/22 0145

## 2022-02-16 NOTE — PLAN OF CARE
Goal Outcome Evaluation:         Vital signs stable. Alert and oriented x 3 but very hard of hearing in left ear and deaf in right ear. On room air. A fib cardiac rhythm. Patient has history of chronic A fib. Tolerating clear liquid diet well. Swallowed medications whole in applesauce. Daughter remains at bedside. Denies nausea or discomfort. Daughter and primary care physician requesting patient be discharged tonight due to his age, increased falls risk and potential for worsening confusion while hospitalized. Patient has home health care givers at home. Dr. Divina barajas with discharge order received. Discharged home with daughter in stable condition. Cardiac monitor and IV sites x 2 removed.

## 2022-02-16 NOTE — ANESTHESIA POSTPROCEDURE EVALUATION
"Patient: Vinicio Samayoa    Procedure Summary     Date: 02/16/22 Room / Location: [facility] OR  / [facility] MAIN OR    Anesthesia Start: 1029 Anesthesia Stop: 1159    Procedure: ESOPHAGOGASTRODUODENOSCOPY/ WITH BIOPSIES AND BOWMAN DILATION 52 FR (N/A Esophagus) Diagnosis:     Surgeons: Vaughn Erazo MD Provider: Silvestre Gentile MD    Anesthesia Type: general ASA Status: 3          Anesthesia Type: general    Vitals  Vitals Value Taken Time   /88 02/16/22 1211   Temp     Pulse 72 02/16/22 1214   Resp     SpO2 99 % 02/16/22 1214   Vitals shown include unvalidated device data.        Post Anesthesia Care and Evaluation    Patient location during evaluation: PACU  Patient participation: complete - patient participated  Level of consciousness: awake and alert  Pain score: 1  Pain management: adequate  Airway patency: patent  Anesthetic complications: No anesthetic complications  PONV Status: none  Cardiovascular status: acceptable  Respiratory status: acceptable  Hydration status: acceptable    Comments: /81 (BP Location: Left leg, Patient Position: Lying)   Pulse 70   Temp 36.1 °C (97 °F) (Oral)   Resp 18   Ht 172.7 cm (68\")   Wt 63 kg (138 lb 14.2 oz)   SpO2 96%   BMI 21.12 kg/m²       "

## 2022-02-16 NOTE — ED PROVIDER NOTES
EMERGENCY DEPARTMENT ENCOUNTER    Room Number:  03/03  Date of encounter:  2/16/2022  PCP: Adolfo Thacker MD  Historian: Patient      HPI:  Chief Complaint: Food bolus       Context: Vinicio Samayoa is a 89 y.o. male with past medical history of CHF, CAD s/p CABG who presents to the ED c/o throat pain.  Patient states that he was eating tonight and also attempted to swallow a pill and feels like either the pill or the food got stuck in his throat.  States that this occurred during dinner at around 6:00 last night.  Patient complains of some burning/irritation pain, but is handling his secretions and has not had any nausea or vomiting, chest pain, abdominal pain, or fever.  Patient denies any previous episodes of an obstructed food bolus or of having ever had an EGD in the past.      PAST MEDICAL HISTORY  Active Ambulatory Problems     Diagnosis Date Noted   • Gross hematuria 10/03/2021     Resolved Ambulatory Problems     Diagnosis Date Noted   • No Resolved Ambulatory Problems     Past Medical History:   Diagnosis Date   • Cancer (HCC)    • CHF (congestive heart failure) (HCC)    • Deaf, left    • Fractures    • Kidney stones          PAST SURGICAL HISTORY  Past Surgical History:   Procedure Laterality Date   • CATARACT EXTRACTION Bilateral    • CORONARY ARTERY BYPASS GRAFT      4 vessel-30 yrs ago    • CYSTOSCOPY WITH CLOT EVACUATION N/A 10/6/2021    Procedure: CYSTOSCOPY WITH CLOT EVACUATION, FULGURATION OF BLEEDING, & CATHETER PLACEMENT;  Surgeon: Mikal Collier Jr., MD;  Location: [facility];  Service: Urology;  Laterality: N/A;   • TONSILLECTOMY           FAMILY HISTORY  No family history on file.      SOCIAL HISTORY  Social History     Socioeconomic History   • Marital status:    Tobacco Use   • Smoking status: Former Smoker   • Smokeless tobacco: Never Used   Vaping Use   • Vaping Use: Never used   Substance and Sexual Activity   • Alcohol use: Not Currently   • Drug use: Not Currently    • Sexual activity: Not Currently         ALLERGIES  Sulfa antibiotics        REVIEW OF SYSTEMS  Review of Systems     All systems reviewed and negative except for those discussed in HPI.       PHYSICAL EXAM    I have reviewed the triage vital signs and nursing notes.    ED Triage Vitals [02/16/22 0115]   Temp Heart Rate Resp BP SpO2   98.4 °F (36.9 °C) 77 16 -- 98 %      Temp src Heart Rate Source Patient Position BP Location FiO2 (%)   -- -- -- -- --       Physical Exam  GENERAL: Alert and oriented x3, appears uncomfortable   HENT: moist mucous membranes  EYES: no scleral icterus  CV: regular rhythm, regular rate  RESPIRATORY: normal effort, clear to auscultate bilaterally  ABDOMEN: soft/nontender  MUSCULOSKELETAL: no deformity  NEURO: alert, moves all extremities, follows commands  SKIN: warm, dry        LAB RESULTS  Recent Results (from the past 24 hour(s))   CBC Auto Differential    Collection Time: 02/16/22  1:31 AM    Specimen: Blood   Result Value Ref Range    WBC 3.75 3.40 - 10.80 10*3/mm3    RBC 4.26 4.14 - 5.80 10*6/mm3    Hemoglobin 12.8 (L) 13.0 - 17.7 g/dL    Hematocrit 39.1 37.5 - 51.0 %    MCV 91.8 79.0 - 97.0 fL    MCH 30.0 26.6 - 33.0 pg    MCHC 32.7 31.5 - 35.7 g/dL    RDW 13.7 12.3 - 15.4 %    RDW-SD 45.4 37.0 - 54.0 fl    MPV 9.9 6.0 - 12.0 fL    Platelets 193 140 - 450 10*3/mm3   Manual Differential    Collection Time: 02/16/22  1:31 AM    Specimen: Blood   Result Value Ref Range    Neutrophil % 63.3 42.7 - 76.0 %    Lymphocyte % 30.6 19.6 - 45.3 %    Monocyte % 5.1 5.0 - 12.0 %    Atypical Lymphocyte % 1.0 0.0 - 5.0 %    Neutrophils Absolute 2.37 1.70 - 7.00 10*3/mm3    Lymphocytes Absolute 1.19 0.70 - 3.10 10*3/mm3    Monocytes Absolute 0.19 0.10 - 0.90 10*3/mm3    Poikilocytes Mod/2+ None Seen    WBC Morphology Normal Normal    Platelet Morphology Normal Normal       Ordered the above labs and independently reviewed the results.        RADIOLOGY  XR Chest 1 View    Result Date:  2/16/2022  Patient: BARB JONES  Time Out: 02:17 Exam(s): FILM CXR 1 VIEW EXAM:   XR Chest, 1 View CLINICAL HISTORY:    Reason for exam: short of breath. TECHNIQUE:   Frontal view of the chest. COMPARISON:   No relevant prior studies available. FINDINGS:   Cardiomegaly.  Slight bibasilar opacities.  No effusion. IMPRESSION:       Cardiomegaly.  Slight bibasilar opacities.  No effusion.     Electronically signed by Eduar Zaldivar MD on 02-16-22 at 0217      I ordered the above noted radiological studies. Reviewed by me and discussed with radiologist.  See dictation for official radiology interpretation.      PROCEDURES    Procedures      MEDICATIONS GIVEN IN ER    Medications   aluminum-magnesium hydroxide-simethicone (MAALOX MAX) 400-400-40 MG/5ML suspension 15 mL (15 mL Oral Given 2/16/22 0136)   Lidocaine Viscous HCl (XYLOCAINE) 2 % solution 15 mL (15 mL Mouth/Throat Given 2/16/22 0136)   glucagon (human recombinant) (GLUCAGEN DIAGNOSTIC) injection 1 mg (1 mg Intravenous Given 2/16/22 0154)   ondansetron (ZOFRAN) injection 4 mg (4 mg Intravenous Given 2/16/22 0207)   methylPREDNISolone sodium succinate (SOLU-Medrol) injection 20 mg (20 mg Intravenous Given 2/16/22 0318)         PROGRESS, DATA ANALYSIS, CONSULTS, AND MEDICAL DECISION MAKING    All labs have been independently reviewed by me.  All radiology studies have been reviewed by me and discussed with radiologist dictating the report.   EKG's independently viewed and interpreted by me.  Discussion below represents my analysis of pertinent findings related to patient's condition, differential diagnosis, treatment plan and final disposition.    DDx: Includes but not limited to esophageal food bolus, dysphagia    ED Course as of 02/16/22 0409   Wed Feb 16, 2022   0143 Patient given GI cocktail, he was unable to tolerate this and threw it up. [QUAN]   0254 Patient care discussed with Dr. Erazo, will consult. [QUAN]   0305 Patient care discussed with Dr. Murcia,  will place in observation. [QUAN]      ED Course User Index  [QUAN] Clay Mir PA       MDM: We will place patient into the observation unit in order to obtain a GI consult and EGD.    PPE: The patient wore a surgical mask throughout the entire patient encounter. I wore an N95.    AS OF 04:09 EST VITALS:    BP - 127/65  HR - 59  TEMP - 98.4 °F (36.9 °C)  O2 SATS - 96%        DIAGNOSIS  Final diagnoses:   Esophageal obstruction   Congestive heart failure, unspecified HF chronicity, unspecified heart failure type (HCC)   History of coronary artery disease         DISPOSITION  ADMISSION    Discussed treatment plan and reason for admission with pt/family and admitting physician.  Pt/family voiced understanding of the plan for admission for further testing/treatment as needed.                  Clay Mir PA  02/16/22 0409

## 2022-02-16 NOTE — ED NOTES
Family requested to move pt to a recliner, pt was moved into a recliner but stated he was less comfortable in the chair, pt was assisted back to bed by this RN and ASHANTI Magaña. BP cuff put back on pt.     Robert Saenz, RN  02/16/22 9478

## 2022-02-16 NOTE — DISCHARGE SUMMARY
Discharge summary    Date of admission 2/16/2022  Date of discharge 2/16/2022    Final diagnosis  Food impaction at distal esophagus status post removal and dilation of entire esophagus  Nonbleeding duodenal ulcer  COVID-19 infection  Recent MSSA UTI  BPH  Urinary retention  Chronic anemia  Chronic kidney disease stage III  Gastroesophageal reflux disease    Discharge medications    Current Facility-Administered Medications:   •  albuterol (PROVENTIL) nebulizer solution 0.083% 2.5 mg/3mL, 2.5 mg, Nebulization, Q6H PRN, Alen Murcia MD  •  ascorbic acid (VITAMIN C) tablet 500 mg, 500 mg, Oral, Daily, Alen Murcia MD, 500 mg at 02/16/22 1600  •  budesonide-formoterol (SYMBICORT) 160-4.5 MCG/ACT inhaler 2 puff, 2 puff, Inhalation, BID - RT, Alen Murcia MD  •  cetirizine (zyrTEC) tablet 5 mg, 5 mg, Oral, Daily, Alen Murcia MD, 5 mg at 02/16/22 1559  •  cholecalciferol (VITAMIN D3) tablet 1,000 Units, 1,000 Units, Oral, Daily, Alen Murcia MD, 1,000 Units at 02/16/22 1559  •  dexamethasone (DECADRON) injection 6 mg, 6 mg, Intravenous, Daily, Vaughn Erazo MD, 6 mg at 02/16/22 1558  •  digoxin (LANOXIN) tablet 125 mcg, 125 mcg, Oral, Daily With Lunch, Vaughn Erazo MD  •  enoxaparin (LOVENOX) syringe 30 mg, 30 mg, Subcutaneous, Q24H, Vaughn Erazo MD, 30 mg at 02/16/22 1559  •  guaiFENesin (MUCINEX) 12 hr tablet 600 mg, 600 mg, Oral, Q12H, Alen Murcia MD  •  latanoprost (XALATAN) 0.005 % ophthalmic solution 1 drop, 1 drop, Both Eyes, Nightly, Vaughn Erazo MD  •  ondansetron (ZOFRAN) injection 4 mg, 4 mg, Intravenous, Q6H PRN, Vaughn Erazo MD  •  [START ON 2/17/2022] pantoprazole (PROTONIX) EC tablet 40 mg, 40 mg, Oral, Q AM, Alen Murcia MD  •  sodium chloride 0.9 % infusion, 75 mL/hr, Intravenous, Continuous, Vaughn Erazo MD, Last Rate: 75 mL/hr at 02/16/22 1600, 75 mL/hr at 02/16/22 1600  •  tamsulosin (FLOMAX) 24 hr capsule 0.4 mg, 0.4 mg, Oral, Nightly, Vaughn Erazo MD  •  zinc  sulfate (ZINCATE) capsule 220 mg, 220 mg, Oral, Daily, Evette Murcia MD, 220 mg at 02/16/22 1299     Consults obtained  Gastroenterology    Procedures  Upper endoscopy with removal of distal esophagus food impaction and dilation of entire esophagus    Hospital course  89-year white male who is well-known to our service admitted to emergency room after esophageal food impaction.  Patient admitted and provided with supportive care and GI consult obtained and the recommend upper endoscopy and remove distal esophageal food impaction with dilation of entire esophagus.  Post endoscopy patient is eating and wants to go home and clear for discharge on Protonix and continue home medications.  Patient also has COVID-19 infection with no symptoms and he will continue symptomatic treatment at home and continue quarantine for at least 5 days.    Discharge diet as tolerated    Activity as tolerated    Medication as above    Follow-up with prime doctor in 1 week and follow-up with gastroenterology per their instruction and take medication as directed    EVETTE MURCIA MD

## 2022-02-16 NOTE — ED TRIAGE NOTES
Pt arrived to ER via pv with c/o possible food bolus.  Pt states he was eating dinner and tried to take his medication when a piece of meat got stuck in his throat.  Pt has attempted to cough it up, but has had no success.    Pt has an IV in his L arm that was placed today due to dehydration.  Pt is being treated for a possible staph infection.   Pt in mask & this RN in appropriate PPE during care.

## 2022-02-16 NOTE — ED PROVIDER NOTES
MD ATTESTATION NOTE    The AFSANEH and I have discussed this patient's history, physical exam, and treatment plan.    I provided a substantive portion of the care of this patient. I personally performed the physical exam, in its entirety. The attached note describes my personal findings.      Vinicio Samayoa is a 89 y.o. male who presents to the ED c/o flulike cilia stuck in his throat.  States he was eating and attempted to take a pill and was unable to swallow.  This occurred around 6 PM yesterday.  She denies nausea vomiting abdominal pain or fever.  No prior history of impacted food bolus.      On exam:  GENERAL: not distressed  HENT: nares patent, no pooling of secretions  EYES: no scleral icterus  CV: regular rhythm, regular rate  RESPIRATORY: normal effort  ABDOMEN: soft  MUSCULOSKELETAL: no deformity  NEURO: alert, moves all extremities, follows commands  SKIN: warm, dry    Labs  Recent Results (from the past 24 hour(s))   CBC Auto Differential    Collection Time: 02/16/22  1:31 AM    Specimen: Blood   Result Value Ref Range    WBC 3.75 3.40 - 10.80 10*3/mm3    RBC 4.26 4.14 - 5.80 10*6/mm3    Hemoglobin 12.8 (L) 13.0 - 17.7 g/dL    Hematocrit 39.1 37.5 - 51.0 %    MCV 91.8 79.0 - 97.0 fL    MCH 30.0 26.6 - 33.0 pg    MCHC 32.7 31.5 - 35.7 g/dL    RDW 13.7 12.3 - 15.4 %    RDW-SD 45.4 37.0 - 54.0 fl    MPV 9.9 6.0 - 12.0 fL    Platelets 193 140 - 450 10*3/mm3   Manual Differential    Collection Time: 02/16/22  1:31 AM    Specimen: Blood   Result Value Ref Range    Neutrophil % 63.3 42.7 - 76.0 %    Lymphocyte % 30.6 19.6 - 45.3 %    Monocyte % 5.1 5.0 - 12.0 %    Atypical Lymphocyte % 1.0 0.0 - 5.0 %    Neutrophils Absolute 2.37 1.70 - 7.00 10*3/mm3    Lymphocytes Absolute 1.19 0.70 - 3.10 10*3/mm3    Monocytes Absolute 0.19 0.10 - 0.90 10*3/mm3    Poikilocytes Mod/2+ None Seen    WBC Morphology Normal Normal    Platelet Morphology Normal Normal       Radiology  XR Chest 1 View    Result Date: 2/16/2022  Patient:  KAREN ANA CRISTINAENT  Time Out: 02:17 Exam(s): FILM CXR 1 VIEW EXAM:   XR Chest, 1 View CLINICAL HISTORY:    Reason for exam: short of breath. TECHNIQUE:   Frontal view of the chest. COMPARISON:   No relevant prior studies available. FINDINGS:   Cardiomegaly.  Slight bibasilar opacities.  No effusion. IMPRESSION:       Cardiomegaly.  Slight bibasilar opacities.  No effusion.     Electronically signed by Eduar Zaldivar MD on 02-16-22 at 0217      Medical Decision Making:  ED Course as of 02/16/22 0307   Wed Feb 16, 2022   0143 Patient given GI cocktail, he was unable to tolerate this and threw it up. [QUAN]   0254 Patient care discussed with Dr. Erazo, will consult. [QUAN]      ED Course User Index  [QUAN] Clay Mir PA           PPE: Both the patient and I wore a surgical mask throughout the entire patient encounter. I wore protective goggles.     Diagnosis  Final diagnoses:   Esophageal obstruction   Congestive heart failure, unspecified HF chronicity, unspecified heart failure type (HCC)   History of coronary artery disease        Romulo Goldman MD  02/16/22 030

## 2022-02-16 NOTE — ANESTHESIA PROCEDURE NOTES
Airway  Urgency: elective    Date/Time: 2/16/2022 10:53 AM  Airway not difficult    General Information and Staff    Patient location during procedure: OR  Anesthesiologist: Silvestre Gentile MD  CRNA: Francesca Duarte CRNA    Indications and Patient Condition  Indications for airway management: airway protection    Preoxygenated: yes  MILS not maintained throughout  Mask difficulty assessment: 1 - vent by mask    Final Airway Details  Final airway type: endotracheal airway      Successful airway: ETT  Cuffed: yes   Successful intubation technique: direct laryngoscopy  Facilitating devices/methods: anterior pressure/BURP  Endotracheal tube insertion site: oral  Blade: Kassie  Blade size: 4  ETT size (mm): 7.0  Cormack-Lehane Classification: grade IIb - view of arytenoids or posterior of glottis only  Placement verified by: chest auscultation and capnometry   Cuff volume (mL): 7  Measured from: lips  ETT/EBT  to lips (cm): 22  Number of attempts at approach: 2  Assessment: lips, teeth, and gum same as pre-op and atraumatic intubation    Additional Comments  Pt preoxygenated prior to induction, easy mask airway, atraumatic intubation,+ ETCO2, + bs bilat,  ETT secured and connected to ventilator.

## 2022-02-16 NOTE — H&P
History and physical    Primary care physician  Dr. Adolfo Thacker    Chief complaint  Esophageal food impaction    History of present illness  89-year-old white male who is well-known to our service with history of BPH urinary retention chronic anemia chronic kidney disease stage III and gastroesophageal reflux disease brought to the emergency room by the family when he was eating and took food bolus and complained of proximal esophageal pain.  Patient also took tetracycline pill prior to that.  Patient evaluated in ER found to have food impaction with esophageal obstruction admit for management.  Patient has no fever chills increased shortness of breath but has none productive chronic cough and work-up in ER also revealed COVID-19 infection.  Patient did not receive COVID-19 vaccine.  Patient is full code per his and family wishes.    PAST MEDICAL HISTORY  •  Chronic anemia     •  BPH     •  Urinary retention     •  Gastroesophageal reflux disease     •  Chronic kidney disease stage III        PAST SURGICAL HISTORY              Procedure Laterality Date   • CATARACT EXTRACTION Bilateral     • CORONARY ARTERY BYPASS GRAFT         4 vessel-30 yrs ago    • CYSTOSCOPY WITH CLOT EVACUATION N/A 10/6/2021     Procedure: CYSTOSCOPY WITH CLOT EVACUATION, FULGURATION OF BLEEDING, & CATHETER PLACEMENT;  Surgeon: Mikal Collier Jr., MD;  Location: [facility];  Service: Urology;  Laterality: N/A;   • TONSILLECTOMY             FAMILY HISTORY  No family history on file.     SOCIAL HISTORY                Socioeconomic History   • Marital status:    Tobacco Use   • Smoking status: Former Smoker   • Smokeless tobacco: Never Used   Vaping Use   • Vaping Use: Never used   Substance and Sexual Activity   • Alcohol use: Not Currently   • Drug use: Not Currently   • Sexual activity: Not Currently         ALLERGIES  Sulfa antibiotics  Home medications reviewed      REVIEW OF SYSTEMS  All systems reviewed and negative except  "for those discussed in HPI.      PHYSICAL EXAM   Blood pressure 144/88, pulse 81, temperature 97.4 °F (36.3 °C), temperature source Oral, resp. rate 18, height 172.7 cm (68\"), weight 63 kg (138 lb 14.2 oz), SpO2 98 %.    GENERAL: Alert and oriented x3, appears uncomfortable   HENT: moist mucous membranes  EYES: no scleral icterus  CV: regular rhythm, regular rate  RESPIRATORY: normal effort, clear to auscultate bilaterally  ABDOMEN: soft/nontender bowel sounds positive  MUSCULOSKELETAL: no deformity  NEURO: alert, moves all extremities, follows commands  SKIN: warm, dry     LAB RESULTS  Lab Results (last 24 hours)     Procedure Component Value Units Date/Time    Ferritin [[id number]]  (Abnormal) Collected: 02/16/22 0854    Specimen: Blood Updated: 02/16/22 1223     Ferritin 800.00 ng/mL     Narrative:      Results may be falsely decreased if patient taking Biotin.      Comprehensive Metabolic Panel [[id number]]  (Abnormal) Collected: 02/16/22 0854    Specimen: Blood Updated: 02/16/22 1000     Glucose 105 mg/dL      BUN 31 mg/dL      Creatinine 1.37 mg/dL      Sodium 135 mmol/L      Potassium 4.7 mmol/L      Chloride 99 mmol/L      CO2 26.0 mmol/L      Calcium 9.0 mg/dL      Total Protein 6.7 g/dL      Albumin 3.20 g/dL      ALT (SGPT) 21 U/L      AST (SGOT) 34 U/L      Alkaline Phosphatase 59 U/L      Total Bilirubin 0.8 mg/dL      eGFR Non African Amer 48 mL/min/1.73      Globulin 3.5 gm/dL      A/G Ratio 0.9 g/dL      BUN/Creatinine Ratio 22.6     Anion Gap 10.0 mmol/L     Narrative:      GFR Normal >60  Chronic Kidney Disease <60  Kidney Failure <15      COVID PRE-OP / PRE-PROCEDURE SCREENING ORDER (NO ISOLATION) - Swab, Nasopharynx [[id number]]  (Abnormal) Collected: 02/16/22 0155    Specimen: Swab from Nasopharynx Updated: 02/16/22 0608    Narrative:      The following orders were created for panel order COVID PRE-OP / PRE-PROCEDURE SCREENING ORDER (NO ISOLATION) - Swab, Nasopharynx.  Procedure                     "           Abnormality         Status                     ---------                               -----------         ------                     COVID-19,[facility]...[[id number]]  Abnormal            Final result                 Please view results for these tests on the individual orders.    COVID-19,[facility] CEPHEID/MARIA D NP SWAB IN TRANSPORT MEDIA 8-12 HR TAT - Swab, Nasopharynx [[id number]]  (Abnormal) Collected: 02/16/22 0155    Specimen: Swab from Nasopharynx Updated: 02/16/22 0608     COVID19 Detected    Narrative:      Fact sheet for providers: https://www.fda.gov/media/972584/download    Fact sheet for patients: https://www.fda.gov/media/216970/download    Test performed by PCR.    Manual Differential [[id number]] Collected: 02/16/22 0131    Specimen: Blood Updated: 02/16/22 0243     Neutrophil % 63.3 %      Lymphocyte % 30.6 %      Monocyte % 5.1 %      Atypical Lymphocyte % 1.0 %      Neutrophils Absolute 2.37 10*3/mm3      Lymphocytes Absolute 1.19 10*3/mm3      Monocytes Absolute 0.19 10*3/mm3      Poikilocytes Mod/2+     WBC Morphology Normal     Platelet Morphology Normal    CBC & Differential [[id number]]  (Abnormal) Collected: 02/16/22 0131    Specimen: Blood Updated: 02/16/22 0157    Narrative:      The following orders were created for panel order CBC & Differential.  Procedure                               Abnormality         Status                     ---------                               -----------         ------                     CBC Auto Differential[[id number]]        Abnormal            Final result                 Please view results for these tests on the individual orders.    CBC Auto Differential [[id number]]  (Abnormal) Collected: 02/16/22 0131    Specimen: Blood Updated: 02/16/22 0157     WBC 3.75 10*3/mm3      RBC 4.26 10*6/mm3      Hemoglobin 12.8 g/dL      Hematocrit 39.1 %      MCV 91.8 fL      MCH 30.0 pg      MCHC 32.7 g/dL      RDW 13.7 %      RDW-SD 45.4 fl      MPV  9.9 fL      Platelets 193 10*3/mm3         Imaging Results (Last 24 Hours)     Procedure Component Value Units Date/Time    XR Chest 1 View [[id number]] Collected: 02/16/22 0218     Updated: 02/16/22 0218    Narrative:        Patient: BARB JONES  Time Out: 02:17  Exam(s): FILM CXR 1 VIEW     EXAM:    XR Chest, 1 View    CLINICAL HISTORY:     Reason for exam: short of breath.    TECHNIQUE:    Frontal view of the chest.    COMPARISON:    No relevant prior studies available.    FINDINGS:      Cardiomegaly.  Slight bibasilar opacities.  No effusion.    IMPRESSION:           Cardiomegaly.  Slight bibasilar opacities.  No effusion.      Impression:          Electronically signed by Eduar Zaldivar MD on 02-16-22 at 0217          Current Facility-Administered Medications:   •  dexamethasone (DECADRON) injection 6 mg, 6 mg, Intravenous, Daily, Alen Murcia MD  •  digoxin (LANOXIN) tablet 125 mcg, 125 mcg, Oral, Daily With Lunch, Alen Murcia MD  •  diphenhydrAMINE (BENADRYL) capsule 25 mg, 25 mg, Oral, Q30 Min PRN, Ashish Greenberg CRNA  •  diphenhydrAMINE (BENADRYL) injection 12.5 mg, 12.5 mg, Intravenous, Q15 Min PRN, Ashish Greenberg CRNA  •  enoxaparin (LOVENOX) syringe 30 mg, 30 mg, Subcutaneous, Q24H, Alen Murcia MD  •  ePHEDrine injection 5 mg, 5 mg, Intravenous, Once PRN, Ashish Greenberg CRNA  •  fentaNYL citrate (PF) (SUBLIMAZE) injection 50 mcg, 50 mcg, Intravenous, Q5 Min PRN, Ashish Greenberg CRNA  •  flumazenil (ROMAZICON) injection 0.2 mg, 0.2 mg, Intravenous, PRN, Ashish Greenberg CRNA  •  hydrALAZINE (APRESOLINE) injection 5 mg, 5 mg, Intravenous, Q10 Min PRN, Ashish Greenbegr CRNA  •  HYDROcodone-acetaminophen (NORCO) 7.5-325 MG per tablet 1 tablet, 1 tablet, Oral, Once PRN, Ashish Greenberg CRNA  •  HYDROmorphone (DILAUDID) injection 0.5 mg, 0.5 mg, Intravenous, Q5 Min PRN, Ashish Greenberg CRNA  •  ibuprofen (ADVIL,MOTRIN) tablet 600 mg, 600 mg, Oral, Once PRN, Ashish Greenberg CRNA  •  labetalol  (NORMODYNE,TRANDATE) injection 5 mg, 5 mg, Intravenous, Q5 Min PRN, Ashish Greenberg CRNA  •  latanoprost (XALATAN) 0.005 % ophthalmic solution 1 drop, 1 drop, Both Eyes, Nightly, Evette Murcia MD  •  naloxone (NARCAN) injection 0.2 mg, 0.2 mg, Intravenous, PRN, Ashish Greenberg CRNA  •  ondansetron (ZOFRAN) injection 4 mg, 4 mg, Intravenous, Once PRN, Ashish Greenberg CRNA  •  ondansetron (ZOFRAN) injection 4 mg, 4 mg, Intravenous, Q6H PRN, Evette Murcia MD  •  oxyCODONE-acetaminophen (PERCOCET) 7.5-325 MG per tablet 1 tablet, 1 tablet, Oral, Q4H PRN, Ashish Greenberg CRNA  •  pantoprazole (PROTONIX) injection 40 mg, 40 mg, Intravenous, Q AM, Evette Murcia MD  •  promethazine (PHENERGAN) suppository 25 mg, 25 mg, Rectal, Once PRN **OR** promethazine (PHENERGAN) tablet 25 mg, 25 mg, Oral, Once PRN, Ashish Greenberg CRNA  •  sodium chloride 0.9 % infusion, 75 mL/hr, Intravenous, Continuous, Evette Murcia MD  •  tamsulosin (FLOMAX) 24 hr capsule 0.4 mg, 0.4 mg, Oral, Nightly, Evette Murcia MD    ASSESSMENT  Food impaction  COVID-19 infection  Recent MSSA UTI  BPH  Urinary retention  Chronic anemia  Chronic kidney disease stage III  Gastroesophageal reflux disease    PLAN  Admit  IVF  NPO  IV Protonix  GI consult for endoscopy  Infectious disease to follow patient  Symptomatic treatment for COVID-19 infection  Continue home medication except tetracycline  Stress ulcer DVT prophylaxis  Supportive care  Patient is full code  Discussed with family and nursing staff  Follow closely and further recommendation current hospital course    EVETTE MURCIA MD

## 2022-02-17 ENCOUNTER — TELEPHONE (OUTPATIENT)
Dept: GASTROENTEROLOGY | Facility: CLINIC | Age: 87
End: 2022-02-17

## 2022-02-17 ENCOUNTER — READMISSION MANAGEMENT (OUTPATIENT)
Dept: CALL CENTER | Facility: HOSPITAL | Age: 87
End: 2022-02-17

## 2022-02-17 LAB
LAB AP CASE REPORT: NORMAL
PATH REPORT.FINAL DX SPEC: NORMAL
PATH REPORT.GROSS SPEC: NORMAL

## 2022-02-17 NOTE — OUTREACH NOTE
Prep Survey      Responses   [facility] facility patient discharged from? [place]   Is LACE score < 7 ? Yes   Emergency Room discharge w/ pulse ox? No   Eligibility Readm Mgmt   Discharge diagnosis Food impaction at distal esophagus status post removal and dilation of entire esophagus, Covid   Does the patient have one of the following disease processes/diagnoses(primary or secondary)? COVID-19   Does the patient have Home health ordered? No   Is there a DME ordered? No   Prep survey completed? Yes          Shell Thomas RN

## 2022-02-17 NOTE — PROGRESS NOTES
Case Management Discharge Note      Final Note: Home         Selected Continued Care - Discharged on 2/16/2022 Admission date: 2/16/2022 - Discharge disposition: Home or Self Care    Destination    No services have been selected for the patient.              Durable Medical Equipment    No services have been selected for the patient.              Dialysis/Infusion    No services have been selected for the patient.              Home Medical Care    No services have been selected for the patient.              Therapy    No services have been selected for the patient.              Community Resources    No services have been selected for the patient.              Community & DME    No services have been selected for the patient.                  Transportation Services  Private: Car    Final Discharge Disposition Code: 01 - home or self-care

## 2022-02-17 NOTE — TELEPHONE ENCOUNTER
Called pt and spoke with daughter and advised of Dr Erazo's note. She verbalized understanding.      Daughter states pt is still drowsy post procedure.  Procedure was yesterday morning and pt was discharged last night.  Daughter states pt speaks appropriately and does not exhibit any stroke like symptoms.        Msg sent to Dr Erazo, and he suggests pt go to the ER.      Called pt's daughter back and advised per Dr Erazo msg.  She verbalized understanding.

## 2022-02-17 NOTE — TELEPHONE ENCOUNTER
----- Message from Vaughn Erazo MD sent at 2/17/2022  2:46 PM EST -----  Biopsies benignContinue PPIFollow-up with primary care doctor

## 2022-02-17 NOTE — OUTREACH NOTE
COVID-19 Week 1 Survey      Responses   [facility] [place] patient discharged from? [place]   Does the patient have one of the following disease processes/diagnoses(primary or secondary)? COVID-19   COVID-19 underlying condition? None   Call Number Call 1   Week 1 Call successful? No          Atiya Irizarry RN

## 2022-02-18 ENCOUNTER — READMISSION MANAGEMENT (OUTPATIENT)
Dept: CALL CENTER | Facility: HOSPITAL | Age: 87
End: 2022-02-18

## 2022-02-18 NOTE — OUTREACH NOTE
COVID-19 Week 1 Survey      Responses   [facility] patient discharged from? [place]   Does the patient have one of the following disease processes/diagnoses(primary or secondary)? COVID-19   COVID-19 underlying condition? None   Call Number Call 2   Week 1 Call successful? Yes   Call start time 1031   Call end time 1035   Discharge diagnosis Food impaction at distal esophagus status post removal and dilation of entire esophagus, Covid   Is patient permission given to speak with other caregiver? Yes   List who call center can speak with Katelin-dtr   Person spoke with today (if not patient) and relationship Katelin-dtr   Meds reviewed with patient/caregiver? Yes   Is the patient having any side effects they believe may be caused by any medication additions or changes? No   Does the patient have all medications ordered at discharge? Yes   Is the patient taking all medications as directed (includes completed medication regime)? Yes   Does the patient have a primary care provider?  Yes   Does the patient have an appointment with their PCP or specialist within 7 days of discharge? No   What is preventing the patient from scheduling follow up appointments within 7 days of discharge? Haven't had time   Nursing Interventions Educated patient on importance of making appointment,  Advised patient to make appointment   Has the patient kept scheduled appointments due by today? N/A   Has home health visited the patient within 72 hours of discharge? N/A   Psychosocial issues? No   Did the patient receive a copy of their discharge instructions? Yes   Did the patient receive a copy of COVID-19 specific instructions? Yes   Nursing interventions Reviewed instructions with patient   What is the patient's perception of their health status since discharge? Improving   Does the patient have any of the following symptoms? None   Nursing Interventions Nurse provided patient education   Pulse Ox monitoring Intermittent   Pulse Ox device  source Patient   O2 Sat comments O2 sat 93-97% on RA   O2 Sat: education provided Sat levels,  Monitoring frequency,  When to seek care   Is the patient/caregiver able to teach back steps to recovery at home? Rest and rebuild strength, gradually increase activity,  Eat a well-balance diet,  Set small, achievable goals for return to baseline health   If the patient is a current smoker, are they able to teach back resources for cessation? Not a smoker   Is the patient/caregiver able to teach back the hierarchy of who to call/visit for symptoms/problems? PCP, Specialist, Home health nurse, Urgent Care, ED, 911 Yes   COVID-19 call completed? Yes   Wrap up additional comments Dtr reports patient is slowly improving, getting up to go to bathroom, sitting up for meals.          Jerrica Sebastian RN

## 2022-02-19 ENCOUNTER — READMISSION MANAGEMENT (OUTPATIENT)
Dept: CALL CENTER | Facility: HOSPITAL | Age: 87
End: 2022-02-19

## 2022-02-19 NOTE — OUTREACH NOTE
COVID-19 Week 1 Survey      Responses   [facility] patient discharged from? [place]   Does the patient have one of the following disease processes/diagnoses(primary or secondary)? COVID-19   COVID-19 underlying condition? None   Call Number Call 3   Week 1 Call successful? No   Discharge diagnosis Food impaction at distal esophagus status post removal and dilation of entire esophagus, Felix LOPEZ RN

## 2022-02-22 ENCOUNTER — READMISSION MANAGEMENT (OUTPATIENT)
Dept: CALL CENTER | Facility: HOSPITAL | Age: 87
End: 2022-02-22

## 2022-02-22 NOTE — OUTREACH NOTE
COVID-19 Week 2 Survey      Responses   [facility] [place] patient discharged from? [place]   Does the patient have one of the following disease processes/diagnoses(primary or secondary)? COVID-19   COVID-19 underlying condition? None   Call Number Call 1   COVID-19 Week 2: Call 1 attempt successful? Yes   Call start time 1450   Call end time 1452   Discharge diagnosis Food impaction at distal esophagus status post removal and dilation of entire esophagus, Covid   Person spoke with today (if not patient) and relationship Katelin-dtr   What is the patient's perception of their health status since discharge? Improving   Does the patient have any of the following symptoms? None  [fatigue]   Pulse Ox monitoring Intermittent   O2 Sat comments O2 sat 93-97% on RA   COVID-19 call completed? Yes   Wrap up additional comments Still improving but slowly.  Denies needs at this time.          Virginie Sosa, RN

## 2022-02-23 ENCOUNTER — NURSE TRIAGE (OUTPATIENT)
Dept: CALL CENTER | Facility: HOSPITAL | Age: 87
End: 2022-02-23

## 2022-02-23 NOTE — TELEPHONE ENCOUNTER
Acetaminophen     Pediatric OTC Drug Dosage Table               Acetaminophen Dosage Table     Child's weight (pounds) 6-11 12-17 18-23 24-35 36-47 48-59 60-71 72-95 96+   Total Amount (mg) 40 80 120 160 240 325 400 480 650   Infant Liquid:   160 mg/5 ml 1.25 ml 2.5 ml 3.75 ml 5 ml -- -- -- -- --   Children’s Liquid:  160 mg/5 ml 1.25 ml 2.5 ml 3.75 ml 5 ml 7.5 ml 10 ml 12.5 ml 15 ml 20 ml   Children’s Liquid:   160 mg/1 teaspoon -- ½ tsp ¾ tsp 1 tsp 1½ tsp 2 tsp 2½ tsp 3 tsp 4 tsp   Chewable   Bob-Strength:   160 mg. tablets -- -- -- 1 tab 1½ tabs 2 tabs 2½ tabs 3 tabs 4 tabs   Adult Regular-Strength:   325 mg. tablets -- -- -- -- -- 1 tab 1 tab 1½ tabs 2 tabs   Adult   Extra-Strength:  500 mg. tablets -- -- -- -- -- -- -- 1 tab 1 tab                   Indications: Treatment of fever and pain.  Table Notes:  ·   Age Limit: Don't use under 12 weeks of age (Reason: fever during the first 12 weeks of life needs to be documented in a medical setting and if present, your infant needs a complete evaluation.) Exception: Fever from immunization if child is 8 weeks of age or older.  ·   Dosage: Determine by finding child's weight in the top row of the dosage table  ·   Measuring the Dosage: Dosing in mLs using a medication syringe is preferred when giving liquid medication (AAP recommendation). Syringes and droppers are more accurate than teaspoons. If possible, use the syringe or dropper that comes with the medicine. If not, medicine syringes are available at pharmacies. If you use a teaspoon, it should be a measuring spoon. Regular spoons are not reliable. Also, remember that 1 level teaspoon equals 5 mL and that ½ teaspoon equals 2.5 mL.  ·   Brand Names: Tylenol, Feverall (suppositories), generic acetaminophen  ·   Caution: Acetaminophen (Tylenol) can be found in many prescription and over-the-counter medicines. Read the labels to be sure your child is not getting it from 2 products. If you have questions, call  your child's doctor.  ·   Caution: Do not alternate acetaminophen (tylenol) and ibuprofen products. Reason: No benefit over using 1 med alone and a risk of overdose. Exception: Your child's doctor has instructed you to do this.  ·   Frequency: Repeat every 4-6 hours as needed. Caution: Don't give more than 5 times a day. Reason: danger of liver damage or failure.  ·   Adult Dosage:  650 mg. MAXIMUM: 3,000 mg in a 24-hour period.  ·   Dissolve Packs:  Dissolvable powder that comes in 160 mg packets for ages 6-11.   ·   Suppositories: Acetaminophen also comes in 80, 120, 325 and 650 mg suppositories (the rectal dose is the same as the dosage given by mouth). Suppositories may only be available at local drugstore pharmacies (not grocery store pharmacies). Have the caller phone their local drugstore first to confirm availability of Feverall or generic suppositories.  ·   Extended-Release: Avoid 650 mg oral products in children (Reason: they are every 8 hour extended-release)  ·   Concentration: Dosage charts are for [place] products only. Concentrations may vary with international pharmaceuticals. Always double check the concentration if product bought from outside the [place]  ·   [employer] (Tylenol maker) no longer makes 80 mg chewable tablets.  Generics may still be available to purchase on-line, but are not sold on store shelves.   ·   Calculating Dosage: 5-7 mg/lb/dose (10-15mg/kg/dose). Do not recommend dosages above the OTC adult dosage listed above.     AUTHOR AND COPYRIGHT  Author:                 Vishal Hernandez M.D.  Copyright             Copyright 2190-3743 [employer]. All rights reserved.  Content Set:        Telephone Triage Protocols - Pediatric Office-Hours Version -   Version                2021  Last Reviewed:   1/20/2021            Reason for Disposition  • [1] Sore throat is the only symptom AND [2] present > 48 hours    Additional Information  • Negative: SEVERE  "difficulty breathing (e.g., struggling for each breath, speaks in single words, stridor)  • Negative: Sounds like a life-threatening emergency to the triager  • Negative: [1] Diagnosed strep throat AND [2] taking antibiotic AND [3] symptoms continue  • Negative: Throat culture results, call about  • Negative: Productive cough is main symptom  • Negative: Non-productive cough is main symptom  • Negative: Hoarseness is main symptom  • Negative: Runny nose is main symptom  • Negative: [1] Drooling or spitting out saliva (because can't swallow) AND [2] normal breathing  • Negative: Unable to open mouth completely  • Negative: [1] Difficulty breathing AND [2] not severe  • Negative: Fever > 104 F (40 C)  • Negative: [1] Refuses to drink anything AND [2] for > 12 hours  • Negative: [1] Drinking very little AND [2] dehydration suspected (e.g., no urine > 12 hours, very dry mouth, very lightheaded)  • Negative: Patient sounds very sick or weak to the triager  • Negative: SEVERE (e.g., excruciating) throat pain  • Negative: [1] Pus on tonsils (back of throat) AND [2]  fever AND [3] swollen neck lymph nodes (\"glands\")  • Negative: [1] Rash AND [2] widespread (especially chest and abdomen)  • Negative: Earache also present  • Negative: Fever present > 3 days (72 hours)  • Negative: Diabetes mellitus or weak immune system (e.g., HIV positive, cancer chemo, splenectomy, organ transplant, chronic steroids)  • Negative: History of rheumatic fever  • Negative: [1] Adult is leaving on a trip AND [2] requests an antibiotic NOW  • Negative: [1] Positive throat culture or rapid strep test (according to lab, PCP, caller, etc.) AND [2] NO  standing order to call in prescription for antibiotic  • Negative: [1] Exposure to family member (or spouse or boyfriend/girlfriend) with test-proven strep AND [2] within last 10 days    Answer Assessment - Initial Assessment Questions  1. ONSET: \"When did the throat start hurting?\" (Hours or days ago) " "      One week ago  2. SEVERITY: \"How bad is the sore throat?\" (Scale 1-10; mild, moderate or severe)    - MILD (1-3):  doesn't interfere with eating or normal activities    - MODERATE (4-7): interferes with eating some solids and normal activities    - SEVERE (8-10):  excruciating pain, interferes with most normal activities    - SEVERE DYSPHAGIA: can't swallow liquids, drooling      mild  3. STREP EXPOSURE: \"Has there been any exposure to strep within the past week?\" If Yes, ask: \"What type of contact occurred?\"       no  4.  VIRAL SYMPTOMS: \"Are there any symptoms of a cold, such as a runny nose, cough, hoarse voice or red eyes?\"       no  5. FEVER: \"Do you have a fever?\" If Yes, ask: \"What is your temperature, how was it measured, and when did it start?\"      no  6. PUS ON THE TONSILS: \"Is there pus on the tonsils in the back of your throat?\"      no  7. OTHER SYMPTOMS: \"Do you have any other symptoms?\" (e.g., difficulty breathing, headache, rash)      denies  8. PREGNANCY: \"Is there any chance you are pregnant?\" \"When was your last menstrual period?\"      na    Protocols used: SORE THROAT-ADULT-      "

## 2023-05-08 ENCOUNTER — APPOINTMENT (OUTPATIENT)
Dept: GENERAL RADIOLOGY | Facility: HOSPITAL | Age: 88
End: 2023-05-08
Payer: MEDICARE

## 2023-05-08 ENCOUNTER — HOSPITAL ENCOUNTER (EMERGENCY)
Facility: HOSPITAL | Age: 88
Discharge: HOME | End: 2023-05-08
Attending: EMERGENCY MEDICINE | Admitting: EMERGENCY MEDICINE
Payer: MEDICARE

## 2023-05-08 VITALS
DIASTOLIC BLOOD PRESSURE: 73 MMHG | SYSTOLIC BLOOD PRESSURE: 111 MMHG | HEART RATE: 63 BPM | TEMPERATURE: 99.9 F | OXYGEN SATURATION: 92 % | RESPIRATION RATE: 18 BRPM

## 2023-05-08 DIAGNOSIS — N28.9 ACUTE ON CHRONIC RENAL INSUFFICIENCY: ICD-10-CM

## 2023-05-08 DIAGNOSIS — R09.02 HYPOXIA: ICD-10-CM

## 2023-05-08 DIAGNOSIS — N18.9 ACUTE ON CHRONIC RENAL INSUFFICIENCY: ICD-10-CM

## 2023-05-08 DIAGNOSIS — I50.9 ACUTE ON CHRONIC CONGESTIVE HEART FAILURE, UNSPECIFIED HEART FAILURE TYPE: ICD-10-CM

## 2023-05-08 DIAGNOSIS — D64.9 CHRONIC ANEMIA: ICD-10-CM

## 2023-05-08 DIAGNOSIS — R06.00 ACUTE DYSPNEA: Primary | ICD-10-CM

## 2023-05-08 DIAGNOSIS — R77.8 ELEVATED TROPONIN: ICD-10-CM

## 2023-05-08 DIAGNOSIS — J18.9 PNEUMONIA OF BOTH LOWER LOBES DUE TO INFECTIOUS ORGANISM: ICD-10-CM

## 2023-05-08 LAB
ALBUMIN SERPL-MCNC: 3.6 G/DL (ref 3.5–5.2)
ALBUMIN/GLOB SERPL: 1 G/DL
ALP SERPL-CCNC: 68 U/L (ref 39–117)
ALT SERPL W P-5'-P-CCNC: 29 U/L (ref 1–41)
ANION GAP SERPL CALCULATED.3IONS-SCNC: 7 MMOL/L (ref 5–15)
AST SERPL-CCNC: 41 U/L (ref 1–40)
B PARAPERT DNA SPEC QL NAA+PROBE: NOT DETECTED
B PERT DNA SPEC QL NAA+PROBE: NOT DETECTED
BASOPHILS # BLD AUTO: 0.01 10*3/MM3 (ref 0–0.2)
BASOPHILS NFR BLD AUTO: 0.1 % (ref 0–1.5)
BILIRUB SERPL-MCNC: 0.8 MG/DL (ref 0–1.2)
BUN SERPL-MCNC: 40 MG/DL (ref 8–23)
BUN/CREAT SERPL: 24.5 (ref 7–25)
C PNEUM DNA NPH QL NAA+NON-PROBE: NOT DETECTED
CALCIUM SPEC-SCNC: 9.7 MG/DL (ref 8.2–9.6)
CHLORIDE SERPL-SCNC: 99 MMOL/L (ref 98–107)
CO2 SERPL-SCNC: 29 MMOL/L (ref 22–29)
CREAT SERPL-MCNC: 1.63 MG/DL (ref 0.76–1.27)
D-LACTATE SERPL-SCNC: 1.5 MMOL/L (ref 0.5–2)
DEPRECATED RDW RBC AUTO: 42.8 FL (ref 37–54)
DIGOXIN SERPL-MCNC: 1.1 NG/ML (ref 0.6–1.2)
EGFRCR SERPLBLD CKD-EPI 2021: 37.8 ML/MIN/1.73
EOSINOPHIL # BLD AUTO: 0.01 10*3/MM3 (ref 0–0.4)
EOSINOPHIL NFR BLD AUTO: 0.1 % (ref 0.3–6.2)
ERYTHROCYTE [DISTWIDTH] IN BLOOD BY AUTOMATED COUNT: 12.1 % (ref 12.3–15.4)
FLUAV SUBTYP SPEC NAA+PROBE: NOT DETECTED
FLUBV RNA ISLT QL NAA+PROBE: NOT DETECTED
GLOBULIN UR ELPH-MCNC: 3.5 GM/DL
GLUCOSE SERPL-MCNC: 141 MG/DL (ref 65–99)
HADV DNA SPEC NAA+PROBE: NOT DETECTED
HCOV 229E RNA SPEC QL NAA+PROBE: NOT DETECTED
HCOV HKU1 RNA SPEC QL NAA+PROBE: NOT DETECTED
HCOV NL63 RNA SPEC QL NAA+PROBE: NOT DETECTED
HCOV OC43 RNA SPEC QL NAA+PROBE: NOT DETECTED
HCT VFR BLD AUTO: 29.7 % (ref 37.5–51)
HGB BLD-MCNC: 9.7 G/DL (ref 13–17.7)
HMPV RNA NPH QL NAA+NON-PROBE: NOT DETECTED
HPIV1 RNA ISLT QL NAA+PROBE: NOT DETECTED
HPIV2 RNA SPEC QL NAA+PROBE: NOT DETECTED
HPIV3 RNA NPH QL NAA+PROBE: NOT DETECTED
HPIV4 P GENE NPH QL NAA+PROBE: NOT DETECTED
IMM GRANULOCYTES # BLD AUTO: 0.06 10*3/MM3 (ref 0–0.05)
IMM GRANULOCYTES NFR BLD AUTO: 0.7 % (ref 0–0.5)
LYMPHOCYTES # BLD AUTO: 0.59 10*3/MM3 (ref 0.7–3.1)
LYMPHOCYTES NFR BLD AUTO: 6.5 % (ref 19.6–45.3)
M PNEUMO IGG SER IA-ACNC: NOT DETECTED
MAGNESIUM SERPL-MCNC: 2.4 MG/DL (ref 1.7–2.3)
MCH RBC QN AUTO: 31.7 PG (ref 26.6–33)
MCHC RBC AUTO-ENTMCNC: 32.7 G/DL (ref 31.5–35.7)
MCV RBC AUTO: 97.1 FL (ref 79–97)
MONOCYTES # BLD AUTO: 0.7 10*3/MM3 (ref 0.1–0.9)
MONOCYTES NFR BLD AUTO: 7.7 % (ref 5–12)
NEUTROPHILS NFR BLD AUTO: 7.71 10*3/MM3 (ref 1.7–7)
NEUTROPHILS NFR BLD AUTO: 84.9 % (ref 42.7–76)
NRBC BLD AUTO-RTO: 0 /100 WBC (ref 0–0.2)
NT-PROBNP SERPL-MCNC: ABNORMAL PG/ML (ref 0–1800)
PLATELET # BLD AUTO: 208 10*3/MM3 (ref 140–450)
PMV BLD AUTO: 10 FL (ref 6–12)
POTASSIUM SERPL-SCNC: 4.8 MMOL/L (ref 3.5–5.2)
PROCALCITONIN SERPL-MCNC: 0.47 NG/ML (ref 0–0.25)
PROT SERPL-MCNC: 7.1 G/DL (ref 6–8.5)
QT INTERVAL: 358 MS
RBC # BLD AUTO: 3.06 10*6/MM3 (ref 4.14–5.8)
RHINOVIRUS RNA SPEC NAA+PROBE: NOT DETECTED
RSV RNA NPH QL NAA+NON-PROBE: NOT DETECTED
SARS-COV-2 RNA NPH QL NAA+NON-PROBE: NOT DETECTED
SODIUM SERPL-SCNC: 135 MMOL/L (ref 136–145)
TROPONIN T SERPL HS-MCNC: 122 NG/L
WBC NRBC COR # BLD: 9.08 10*3/MM3 (ref 3.4–10.8)

## 2023-05-08 PROCEDURE — 99284 EMERGENCY DEPT VISIT MOD MDM: CPT

## 2023-05-08 PROCEDURE — 83880 ASSAY OF NATRIURETIC PEPTIDE: CPT | Performed by: PHYSICIAN ASSISTANT

## 2023-05-08 PROCEDURE — 84145 PROCALCITONIN (PCT): CPT | Performed by: PHYSICIAN ASSISTANT

## 2023-05-08 PROCEDURE — 83735 ASSAY OF MAGNESIUM: CPT | Performed by: PHYSICIAN ASSISTANT

## 2023-05-08 PROCEDURE — 96365 THER/PROPH/DIAG IV INF INIT: CPT

## 2023-05-08 PROCEDURE — 99283 EMERGENCY DEPT VISIT LOW MDM: CPT

## 2023-05-08 PROCEDURE — 85025 COMPLETE CBC W/AUTO DIFF WBC: CPT | Performed by: PHYSICIAN ASSISTANT

## 2023-05-08 PROCEDURE — 93005 ELECTROCARDIOGRAM TRACING: CPT | Performed by: PHYSICIAN ASSISTANT

## 2023-05-08 PROCEDURE — 87070 CULTURE OTHR SPECIMN AEROBIC: CPT | Performed by: PHYSICIAN ASSISTANT

## 2023-05-08 PROCEDURE — 80162 ASSAY OF DIGOXIN TOTAL: CPT | Performed by: PHYSICIAN ASSISTANT

## 2023-05-08 PROCEDURE — 80053 COMPREHEN METABOLIC PANEL: CPT | Performed by: PHYSICIAN ASSISTANT

## 2023-05-08 PROCEDURE — 87205 SMEAR GRAM STAIN: CPT | Performed by: PHYSICIAN ASSISTANT

## 2023-05-08 PROCEDURE — 87040 BLOOD CULTURE FOR BACTERIA: CPT | Performed by: PHYSICIAN ASSISTANT

## 2023-05-08 PROCEDURE — 84484 ASSAY OF TROPONIN QUANT: CPT | Performed by: PHYSICIAN ASSISTANT

## 2023-05-08 PROCEDURE — 0202U NFCT DS 22 TRGT SARS-COV-2: CPT | Performed by: PHYSICIAN ASSISTANT

## 2023-05-08 PROCEDURE — 25010000002 CEFTRIAXONE PER 250 MG: Performed by: EMERGENCY MEDICINE

## 2023-05-08 PROCEDURE — 36415 COLL VENOUS BLD VENIPUNCTURE: CPT

## 2023-05-08 PROCEDURE — 71045 X-RAY EXAM CHEST 1 VIEW: CPT

## 2023-05-08 PROCEDURE — 83605 ASSAY OF LACTIC ACID: CPT | Performed by: PHYSICIAN ASSISTANT

## 2023-05-08 RX ORDER — IPRATROPIUM BROMIDE AND ALBUTEROL SULFATE 2.5; .5 MG/3ML; MG/3ML
3 SOLUTION RESPIRATORY (INHALATION) ONCE
Status: DISCONTINUED | OUTPATIENT
Start: 2023-05-08 | End: 2023-05-08 | Stop reason: HOSPADM

## 2023-05-08 RX ORDER — ALBUTEROL SULFATE 2.5 MG/3ML
2.5 SOLUTION RESPIRATORY (INHALATION) EVERY 4 HOURS PRN
Qty: 90 ML | Refills: 0 | Status: SHIPPED | OUTPATIENT
Start: 2023-05-08 | End: 2023-05-08 | Stop reason: SDUPTHER

## 2023-05-08 RX ORDER — ALBUTEROL SULFATE 2.5 MG/3ML
2.5 SOLUTION RESPIRATORY (INHALATION) ONCE
Status: DISCONTINUED | OUTPATIENT
Start: 2023-05-08 | End: 2023-05-08 | Stop reason: HOSPADM

## 2023-05-08 RX ORDER — ALBUTEROL SULFATE 2.5 MG/3ML
2.5 SOLUTION RESPIRATORY (INHALATION) EVERY 4 HOURS PRN
Qty: 90 ML | Refills: 0 | Status: SHIPPED | OUTPATIENT
Start: 2023-05-08

## 2023-05-08 RX ORDER — CEFDINIR 300 MG/1
300 CAPSULE ORAL 2 TIMES DAILY
Qty: 20 CAPSULE | Refills: 0 | Status: SHIPPED | OUTPATIENT
Start: 2023-05-08 | End: 2023-05-18

## 2023-05-08 RX ADMIN — CEFTRIAXONE 2 G: 2 INJECTION, POWDER, FOR SOLUTION INTRAMUSCULAR; INTRAVENOUS at 12:30

## 2023-05-08 NOTE — ED PROVIDER NOTES
EMERGENCY DEPARTMENT ENCOUNTER    Room Number:  08/08  Date seen:  5/8/2023  PCP: Adolfo Thacker MD  Discussed/ obtained information from independent historians: Daughter at bedside      HPI:  Chief Complaint: Cough    Context: Vinicio Samayoa is a 89 y.o. male who presents to the ED c/o cough.  Patient has had persistent productive cough over the last several days.  He is bringing up thick green sputum.  This makes him feel short of breath.  Denies associated fever or chest pain.  No known sick contacts.  Patient denies anticoagulation.  Patient denies headache, syncope, abdominal pain, leg swelling, or any other systemic complaint.  Patient denies current tobacco use.      External (non-ED) record review:   · Reviewed note from office visit with cardiology on 12/21/2022 where patient was seen for heart failure, atrial fibrillation, and CAD.  Reviewed assessment and plan.  Patient encouraged to continue digoxin, no other changes to medications.  · Reviewed most recent laboratory studies.  Most recent CMP with creatinine 1.37.  Most recent CBC with hemoglobin 12.8.      PAST MEDICAL HISTORY  Active Ambulatory Problems     Diagnosis Date Noted   • Gross hematuria 10/03/2021   • Esophageal obstruction 02/16/2022     Resolved Ambulatory Problems     Diagnosis Date Noted   • No Resolved Ambulatory Problems     Past Medical History:   Diagnosis Date   • Cancer    • CHF (congestive heart failure)    • Deaf, left    • Fractures    • Kidney stones          PAST SURGICAL HISTORY  Past Surgical History:   Procedure Laterality Date   • CATARACT EXTRACTION Bilateral    • CORONARY ARTERY BYPASS GRAFT      4 vessel-30 yrs ago    • CYSTOSCOPY WITH CLOT EVACUATION N/A 10/6/2021    Procedure: CYSTOSCOPY WITH CLOT EVACUATION, FULGURATION OF BLEEDING, & CATHETER PLACEMENT;  Surgeon: Mikal Collier Jr., MD;  Location: [facility];  Service: Urology;  Laterality: N/A;   • ENDOSCOPY N/A 2/16/2022    Procedure:  ESOPHAGOGASTRODUODENOSCOPY/ WITH BIOPSIES AND BOWMAN DILATION 52 FR;  Surgeon: Vaughn Erazo MD;  Location: [facility] OR;  Service: Gastroenterology;  Laterality: N/A;  PRE- POSSILBE FOOD BOLUS  POST- DUODENAL ULCER   • TONSILLECTOMY           FAMILY HISTORY  No family history on file.      SOCIAL HISTORY  Social History     Socioeconomic History   • Marital status:    Tobacco Use   • Smoking status: Former   • Smokeless tobacco: Never   Vaping Use   • Vaping Use: Never used   Substance and Sexual Activity   • Alcohol use: Not Currently   • Drug use: Not Currently   • Sexual activity: Not Currently         ALLERGIES  Sulfa antibiotics        REVIEW OF SYSTEMS  Review of Systems   Constitutional: Negative for chills and fever.   HENT: Negative for ear pain and sore throat.    Respiratory: Positive for cough and shortness of breath.    Cardiovascular: Negative for chest pain and palpitations.   Gastrointestinal: Negative for abdominal pain and vomiting.   Genitourinary: Negative for dysuria and hematuria.   Musculoskeletal: Negative for arthralgias and joint swelling.   Skin: Negative for pallor and rash.   Neurological: Negative for syncope and headaches.   Psychiatric/Behavioral: Negative for confusion and hallucinations.            PHYSICAL EXAM  ED Triage Vitals [05/08/23 0933]   Temp Heart Rate Resp BP SpO2   99.9 °F (37.7 °C) 88 -- -- (!) 89 %      Temp src Heart Rate Source Patient Position BP Location FiO2 (%)   -- -- -- -- --       Physical Exam  Constitutional:       Appearance: Normal appearance.   HENT:      Head: Normocephalic and atraumatic.      Nose: Nose normal.      Mouth/Throat:      Mouth: Mucous membranes are moist.   Eyes:      Conjunctiva/sclera: Conjunctivae normal.      Pupils: Pupils are equal, round, and reactive to light.   Cardiovascular:      Rate and Rhythm: Normal rate and regular rhythm.      Pulses: Normal pulses.      Heart sounds: Normal heart sounds.      Comments:  Distal pulses intact  Pulmonary:      Effort: Pulmonary effort is normal.      Breath sounds: Normal breath sounds.      Comments: Coarse breath sounds bilaterally.  Patient with bronchospastic coughing.  Abdominal:      General: There is no distension.   Musculoskeletal:         General: Normal range of motion.      Cervical back: Normal range of motion and neck supple.      Right lower leg: No edema.      Left lower leg: No edema.   Skin:     General: Skin is warm.      Capillary Refill: Capillary refill takes less than 2 seconds.   Neurological:      General: No focal deficit present.      Mental Status: He is alert and oriented to person, place, and time.   Psychiatric:         Mood and Affect: Mood normal.         Vital signs and nursing notes reviewed.          LAB RESULTS  Recent Results (from the past 24 hour(s))   ECG 12 Lead Dyspnea    Collection Time: 05/08/23  9:49 AM   Result Value Ref Range    QT Interval 358 ms   Respiratory Panel PCR w/COVID-19(SARS-CoV-2) JINNY/MIKO/MARK/PAD/COR/MAD/LILLIAM In-House, NP Swab in UTM/[facility], 3-4 HR TAT - Swab, Nasopharynx    Collection Time: 05/08/23 10:11 AM    Specimen: Nasopharynx; Swab   Result Value Ref Range    ADENOVIRUS, PCR Not Detected Not Detected    Coronavirus 229E Not Detected Not Detected    Coronavirus HKU1 Not Detected Not Detected    Coronavirus NL63 Not Detected Not Detected    Coronavirus OC43 Not Detected Not Detected    COVID19 Not Detected Not Detected - Ref. Range    Human Metapneumovirus Not Detected Not Detected    Human Rhinovirus/Enterovirus Not Detected Not Detected    Influenza A PCR Not Detected Not Detected    Influenza B PCR Not Detected Not Detected    Parainfluenza Virus 1 Not Detected Not Detected    Parainfluenza Virus 2 Not Detected Not Detected    Parainfluenza Virus 3 Not Detected Not Detected    Parainfluenza Virus 4 Not Detected Not Detected    RSV, PCR Not Detected Not Detected    Bordetella pertussis pcr Not Detected Not Detected     Bordetella parapertussis PCR Not Detected Not Detected    Chlamydophila pneumoniae PCR Not Detected Not Detected    Mycoplasma pneumo by PCR Not Detected Not Detected   Comprehensive Metabolic Panel    Collection Time: 05/08/23 10:20 AM    Specimen: Blood   Result Value Ref Range    Glucose 141 (H) 65 - 99 mg/dL    BUN 40 (H) 8 - 23 mg/dL    Creatinine 1.63 (H) 0.76 - 1.27 mg/dL    Sodium 135 (L) 136 - 145 mmol/L    Potassium 4.8 3.5 - 5.2 mmol/L    Chloride 99 98 - 107 mmol/L    CO2 29.0 22.0 - 29.0 mmol/L    Calcium 9.7 (H) 8.2 - 9.6 mg/dL    Total Protein 7.1 6.0 - 8.5 g/dL    Albumin 3.6 3.5 - 5.2 g/dL    ALT (SGPT) 29 1 - 41 U/L    AST (SGOT) 41 (H) 1 - 40 U/L    Alkaline Phosphatase 68 39 - 117 U/L    Total Bilirubin 0.8 0.0 - 1.2 mg/dL    Globulin 3.5 gm/dL    A/G Ratio 1.0 g/dL    BUN/Creatinine Ratio 24.5 7.0 - 25.0    Anion Gap 7.0 5.0 - 15.0 mmol/L    eGFR 37.8 (L) >60.0 mL/min/1.73   Single High Sensitivity Troponin T    Collection Time: 05/08/23 10:20 AM    Specimen: Blood   Result Value Ref Range    HS Troponin T 122 (C) <15 ng/L   Procalcitonin    Collection Time: 05/08/23 10:20 AM    Specimen: Blood   Result Value Ref Range    Procalcitonin 0.47 (H) 0.00 - 0.25 ng/mL   BNP    Collection Time: 05/08/23 10:20 AM    Specimen: Blood   Result Value Ref Range    proBNP 36,919.0 (H) 0.0 - 1,800.0 pg/mL   Lactic Acid, Plasma    Collection Time: 05/08/23 10:20 AM    Specimen: Blood   Result Value Ref Range    Lactate 1.5 0.5 - 2.0 mmol/L   Magnesium    Collection Time: 05/08/23 10:20 AM    Specimen: Blood   Result Value Ref Range    Magnesium 2.4 (H) 1.7 - 2.3 mg/dL   Digoxin Level    Collection Time: 05/08/23 10:20 AM    Specimen: Blood   Result Value Ref Range    Digoxin 1.10 0.60 - 1.20 ng/mL   CBC Auto Differential    Collection Time: 05/08/23 10:20 AM    Specimen: Blood   Result Value Ref Range    WBC 9.08 3.40 - 10.80 10*3/mm3    RBC 3.06 (L) 4.14 - 5.80 10*6/mm3    Hemoglobin 9.7 (L) 13.0 - 17.7 g/dL     Hematocrit 29.7 (L) 37.5 - 51.0 %    MCV 97.1 (H) 79.0 - 97.0 fL    MCH 31.7 26.6 - 33.0 pg    MCHC 32.7 31.5 - 35.7 g/dL    RDW 12.1 (L) 12.3 - 15.4 %    RDW-SD 42.8 37.0 - 54.0 fl    MPV 10.0 6.0 - 12.0 fL    Platelets 208 140 - 450 10*3/mm3    Neutrophil % 84.9 (H) 42.7 - 76.0 %    Lymphocyte % 6.5 (L) 19.6 - 45.3 %    Monocyte % 7.7 5.0 - 12.0 %    Eosinophil % 0.1 (L) 0.3 - 6.2 %    Basophil % 0.1 0.0 - 1.5 %    Immature Grans % 0.7 (H) 0.0 - 0.5 %    Neutrophils, Absolute 7.71 (H) 1.70 - 7.00 10*3/mm3    Lymphocytes, Absolute 0.59 (L) 0.70 - 3.10 10*3/mm3    Monocytes, Absolute 0.70 0.10 - 0.90 10*3/mm3    Eosinophils, Absolute 0.01 0.00 - 0.40 10*3/mm3    Basophils, Absolute 0.01 0.00 - 0.20 10*3/mm3    Immature Grans, Absolute 0.06 (H) 0.00 - 0.05 10*3/mm3    nRBC 0.0 0.0 - 0.2 /100 WBC       Ordered the above labs and reviewed the results.        RADIOLOGY  XR Chest 1 View    Result Date: 5/8/2023  XR CHEST 1 VW-  HISTORY: Male who is 89 years-old,  cough  TECHNIQUE: Frontal view of the chest  COMPARISON: 02/16/2022  FINDINGS: The heart size is borderline. Sternotomy wires are present. Aorta is calcified. Pulmonary vasculature is unremarkable. Mild peribronchial thickening suggests airways inflammation/bronchitis. Small atelectasis or infiltrate in the lower lungs. No focal pulmonary consolidation, pleural effusion, or pneumothorax. No acute osseous process.      Small atelectasis or infiltrate in the lower lungs. Mild airways inflammation/bronchitis. Borderline heart size.  This report was finalized on 5/8/2023 10:17 AM by Dr. Andrea Redd M.D.        Ordered the above noted radiological studies. Reviewed by me in PACS.              MEDICATIONS GIVEN IN ER  Medications   albuterol (PROVENTIL) nebulizer solution 0.083% 2.5 mg/3mL (2.5 mg Nebulization Not Given 5/8/23 1030)   ipratropium-albuterol (DUO-NEB) nebulizer solution 3 mL (3 mL Nebulization Not Given 5/8/23 1031)   cefTRIAXone (ROCEPHIN) 2  g in sodium chloride 0.9 % 100 mL IVPB-VTB (0 g Intravenous Stopped 5/8/23 1300)                   MEDICAL DECISION MAKING, PROGRESS, and CONSULTS    All labs have been independently reviewed by me.  All radiology studies have been reviewed by me and I have also reviewed the radiology report.   EKG's independently viewed and interpreted by me.  Discussion below represents my analysis of pertinent findings related to patient's condition, differential diagnosis, treatment plan and final disposition.      Additional sources:    - Chronic or social conditions impacting care: Advanced age      Orders placed during this visit:  Orders Placed This Encounter   Procedures   • Respiratory Panel PCR w/COVID-19(SARS-CoV-2) JINNY/MIKO/MARK/PAD/COR/MAD/LILLIAM In-House, NP Swab in UTM/VTM, 3-4 HR TAT - Swab, Nasopharynx   • Blood Culture - Blood,   • Blood Culture - Blood,   • Respiratory Culture - Sputum, Cough   • XR Chest 1 View   • Comprehensive Metabolic Panel   • Single High Sensitivity Troponin T   • Procalcitonin   • BNP   • Lactic Acid, Plasma   • Magnesium   • Digoxin Level   • CBC Auto Differential   • High Sensitivity Troponin T 2Hr   • ECG 12 Lead Dyspnea   • CBC & Differential         Additional orders considered but not ordered:  Sputum culture    Differential diagnosis:  Bronchitis, pneumonia, viral upper respiratory infection      Independent interpretation of labs, radiology studies, and discussions with consultants:  ED Course as of 05/08/23 1339   Mon May 08, 2023   1005 XR Chest 1 View  My independent interpretation of the chest x-ray is prior sternotomy.  No dense consolidation. [TR]   1027 Ordered breathing treatments for the patient.  Patient family at bedside is refusing.  They acknowledge the fact that his O2 sat is 88%, reports he does not need medication, and his oxygen saturation is fine.  Ordering RT to leave the room. [MP]   1031 EKG          EKG time: 949  Rhythm/Rate: Wide-complex irregular rhythm  P  waves and IA: Absent  QRS, axis: Left bundle branch block, left axis  ST and T waves: Sgarbossa negative    Independently Interpreted by me  Not significantly changed compared to prior 10/6/2021   [TR]   1111 Procalcitonin(!): 0.47 [MP]   1111 WBC: 9.08 [MP]   1111 Hemoglobin(!): 9.7 [MP]   1111 BUN(!): 40 [MP]   1111 Creatinine(!): 1.63 [MP]   1111 Magnesium(!): 2.4 [MP]   1111 HS Troponin T(!!): 122 [MP]   1156 I had an extensive conversation at the bedside with 2 family members by phone and 2 family members in person.  They report their goal overall is to minimize the pain to the patient but they are not focused on comfort care only.  They do want treatment.  They do want to keep from out of the hospital as much as possible.  They are managed by the heart failure clinic at [place].  He has an EF of 25% by old records.  The patient's renal function is increased over his baseline, he has a markedly elevated BNP, he has significant elevated high-sensitivity troponin, and elevated procalcitonin which is likely related to his renal insufficiency although he is coughing up thick sputum.  He is 89 years old and has a poor ejection fraction and is having episodes of hypoxia.  I did notify them that he is at high risk of decompensation and has multiple comorbidities stacked against him.  At this moment is not clear as far as the trend of his multiple lab abnormalities and findings today.  They are interested in taking him home with home health.  I told them that I would be agreeable to this if their overall goal is more comfort than anything but my hesitancy if their goal is treatment improvement is that home health is not really equipped to handle this number of issues in a way that is responsive enough to improve him.  I did notify them that I would normally admit this patient without any hesitation but I want to respect their wishes and the overall goals of the patient care. [TR]   1222 I discussed again with the  patient and family at the bedside.  I discussed pacifically with Cristhian, the patient's son who is an  and 2 additional family members in the room.  They have discussed with the heart failure clinic at [place] and that clinic is going to see him at 3 PM today.  They intend to take him home today.  They do not want him admitted to the hospital.  They do understand that this could result in his death if he were to be worsening.  They understand that we do not have trends on our current blood work.  He reports her goal is for him to be at home and to be comfortable.  Plan discharge after the dose of Rocephin so that he can go to the heart failure clinic.  He is still having episodes of hypoxia intermittently down to the upper 80s.  We do not have a trend on his troponin.  They verbalized specifically that that they understand that he would normally be admitted to the hospital however they prefer a different course of action today. [TR]      ED Course User Index  [MP] Debbie Dockery PA-C  [TR] Raymond Singh MD             Patient was wearing a face mask when I entered the room and they continued to wear a mask throughout their stay in the ED.  I wore PPE, including  gloves, face mask with shield or face mask with goggles whenever I was in the room with patient.     DIAGNOSIS  Final diagnoses:   Acute dyspnea   Acute on chronic congestive heart failure, unspecified heart failure type   Acute on chronic renal insufficiency   Hypoxia   Elevated troponin   Chronic anemia   Pneumonia of both lower lobes due to infectious organism           Follow Up:  Adolfo Thacker MD  [address]  [place]  [facility] [id number] [phone]    In 2 days      [facility] Emergency Department  [address] [facility] [address] [phone]    As needed      RX:     Medication List      New Prescriptions    albuterol (2.5 MG/3ML) 0.083% nebulizer solution  Commonly known as: PROVENTIL  Take 2.5  mg by nebulization Every 4 (Four) Hours As Needed for Wheezing.     cefdinir 300 MG capsule  Commonly known as: OMNICEF  Take 1 capsule by mouth 2 (Two) Times a Day for 10 days.           Where to Get Your Medications      These medications were sent to [facility] [place], [place] - [address] - [phone]  - [phone] FX  [address] [facility] [address]    Phone: [phone]   · albuterol (2.5 MG/3ML) 0.083% nebulizer solution  · cefdinir 300 MG capsule         Latest Documented Vital Signs:  As of 13:39 EDT  BP- 111/73 HR- 63 Temp- 99.9 °F (37.7 °C) O2 sat- 92%              --    Please note that portions of this were completed with a voice recognition program.       Note Disclaimer: At [facility], we believe that sharing information builds trust and better relationships. You are receiving this note because you are receiving care at [facility] or recently visited. It is possible you will see health information before a provider has talked with you about it. This kind of information can be easy to misunderstand. To help you fully understand what it means for your health, we urge you to discuss this note with your provider.           Debbie Dockery PA-C  05/08/23 9565

## 2023-05-08 NOTE — ED PROVIDER NOTES
MD ATTESTATION NOTE    The AFSANEH and I have discussed this patient's history, physical exam, and treatment plan.  I have reviewed the documentation and personally had a face to face interaction with the patient. I affirm the documentation and agree with the treatment and plan.  The attached note describes my personal findings.    I provided a substantive portion of the care of this patient. I personally performed the physical exam, in its entirety.    Independent Historians: Patient, family    A complete HPI/ROS/PMH/PSH/SH/FH are unobtainable due to: Hard of hearing    Chronic or social conditions impacting patient care (social determinants of health): None    Vinicio Samayoa is a 89 y.o. male who presents to the ED c/o acute cough and shortness of breath.  Family reports that he has had a productive cough for the last several days.  They report shortness of breath.  They report that they check his oxygen level and it has been low with coughing down to 88%.  He is not on oxygen at home.  He denies any fevers or chills.  He denies any falls.  He denies any sick contacts.  He states he is not on blood thinners but does have a history of A-fib.  Family is very insistent that they want to take him home today and they want to minimize interventions and diagnostics.      Review of prior external notes (non-ED) -and- Review of prior external test results outside of this encounter: Discharge summary dated 2/16/2022 with a food impaction and a nonbleeding duodenal ulcer.  He was found to have COVID-19.  They was discharged home on Protonix.  He is asymptomatic from his COVID.    Prescription drug monitoring program review:        On exam:  GENERAL: Awake, alert, no acute distress  SKIN: Warm, dry  HENT: Normocephalic, atraumatic  EYES: no scleral icterus  CV: regular rhythm, regular rate  RESPIRATORY: normal effort, lungs with crackles bilaterally  ABDOMEN: soft, nontender, nondistended  MUSCULOSKELETAL: no deformity, no  significant edema  NEURO: alert, moves all extremities, follows commands    Labs  Recent Results (from the past 24 hour(s))   ECG 12 Lead Dyspnea    Collection Time: 05/08/23  9:49 AM   Result Value Ref Range    QT Interval 358 ms   Respiratory Panel PCR w/COVID-19(SARS-CoV-2) JINNY/MIKO/MARK/PAD/COR/MAD/LILLIAM In-House, NP Swab in UTM/VTM, 3-4 HR TAT - Swab, Nasopharynx    Collection Time: 05/08/23 10:11 AM    Specimen: Nasopharynx; Swab   Result Value Ref Range    ADENOVIRUS, PCR Not Detected Not Detected    Coronavirus 229E Not Detected Not Detected    Coronavirus HKU1 Not Detected Not Detected    Coronavirus NL63 Not Detected Not Detected    Coronavirus OC43 Not Detected Not Detected    COVID19 Not Detected Not Detected - Ref. Range    Human Metapneumovirus Not Detected Not Detected    Human Rhinovirus/Enterovirus Not Detected Not Detected    Influenza A PCR Not Detected Not Detected    Influenza B PCR Not Detected Not Detected    Parainfluenza Virus 1 Not Detected Not Detected    Parainfluenza Virus 2 Not Detected Not Detected    Parainfluenza Virus 3 Not Detected Not Detected    Parainfluenza Virus 4 Not Detected Not Detected    RSV, PCR Not Detected Not Detected    Bordetella pertussis pcr Not Detected Not Detected    Bordetella parapertussis PCR Not Detected Not Detected    Chlamydophila pneumoniae PCR Not Detected Not Detected    Mycoplasma pneumo by PCR Not Detected Not Detected   Comprehensive Metabolic Panel    Collection Time: 05/08/23 10:20 AM    Specimen: Blood   Result Value Ref Range    Glucose 141 (H) 65 - 99 mg/dL    BUN 40 (H) 8 - 23 mg/dL    Creatinine 1.63 (H) 0.76 - 1.27 mg/dL    Sodium 135 (L) 136 - 145 mmol/L    Potassium 4.8 3.5 - 5.2 mmol/L    Chloride 99 98 - 107 mmol/L    CO2 29.0 22.0 - 29.0 mmol/L    Calcium 9.7 (H) 8.2 - 9.6 mg/dL    Total Protein 7.1 6.0 - 8.5 g/dL    Albumin 3.6 3.5 - 5.2 g/dL    ALT (SGPT) 29 1 - 41 U/L    AST (SGOT) 41 (H) 1 - 40 U/L    Alkaline Phosphatase 68 39 - 117  U/L    Total Bilirubin 0.8 0.0 - 1.2 mg/dL    Globulin 3.5 gm/dL    A/G Ratio 1.0 g/dL    BUN/Creatinine Ratio 24.5 7.0 - 25.0    Anion Gap 7.0 5.0 - 15.0 mmol/L    eGFR 37.8 (L) >60.0 mL/min/1.73   Single High Sensitivity Troponin T    Collection Time: 05/08/23 10:20 AM    Specimen: Blood   Result Value Ref Range    HS Troponin T 122 (C) <15 ng/L   Procalcitonin    Collection Time: 05/08/23 10:20 AM    Specimen: Blood   Result Value Ref Range    Procalcitonin 0.47 (H) 0.00 - 0.25 ng/mL   BNP    Collection Time: 05/08/23 10:20 AM    Specimen: Blood   Result Value Ref Range    proBNP 36,919.0 (H) 0.0 - 1,800.0 pg/mL   Lactic Acid, Plasma    Collection Time: 05/08/23 10:20 AM    Specimen: Blood   Result Value Ref Range    Lactate 1.5 0.5 - 2.0 mmol/L   Magnesium    Collection Time: 05/08/23 10:20 AM    Specimen: Blood   Result Value Ref Range    Magnesium 2.4 (H) 1.7 - 2.3 mg/dL   Digoxin Level    Collection Time: 05/08/23 10:20 AM    Specimen: Blood   Result Value Ref Range    Digoxin 1.10 0.60 - 1.20 ng/mL   CBC Auto Differential    Collection Time: 05/08/23 10:20 AM    Specimen: Blood   Result Value Ref Range    WBC 9.08 3.40 - 10.80 10*3/mm3    RBC 3.06 (L) 4.14 - 5.80 10*6/mm3    Hemoglobin 9.7 (L) 13.0 - 17.7 g/dL    Hematocrit 29.7 (L) 37.5 - 51.0 %    MCV 97.1 (H) 79.0 - 97.0 fL    MCH 31.7 26.6 - 33.0 pg    MCHC 32.7 31.5 - 35.7 g/dL    RDW 12.1 (L) 12.3 - 15.4 %    RDW-SD 42.8 37.0 - 54.0 fl    MPV 10.0 6.0 - 12.0 fL    Platelets 208 140 - 450 10*3/mm3    Neutrophil % 84.9 (H) 42.7 - 76.0 %    Lymphocyte % 6.5 (L) 19.6 - 45.3 %    Monocyte % 7.7 5.0 - 12.0 %    Eosinophil % 0.1 (L) 0.3 - 6.2 %    Basophil % 0.1 0.0 - 1.5 %    Immature Grans % 0.7 (H) 0.0 - 0.5 %    Neutrophils, Absolute 7.71 (H) 1.70 - 7.00 10*3/mm3    Lymphocytes, Absolute 0.59 (L) 0.70 - 3.10 10*3/mm3    Monocytes, Absolute 0.70 0.10 - 0.90 10*3/mm3    Eosinophils, Absolute 0.01 0.00 - 0.40 10*3/mm3    Basophils, Absolute 0.01 0.00 - 0.20  10*3/mm3    Immature Grans, Absolute 0.06 (H) 0.00 - 0.05 10*3/mm3    nRBC 0.0 0.0 - 0.2 /100 WBC       Radiology  XR Chest 1 View    Result Date: 5/8/2023  XR CHEST 1 VW-  HISTORY: Male who is 89 years-old,  cough  TECHNIQUE: Frontal view of the chest  COMPARISON: 02/16/2022  FINDINGS: The heart size is borderline. Sternotomy wires are present. Aorta is calcified. Pulmonary vasculature is unremarkable. Mild peribronchial thickening suggests airways inflammation/bronchitis. Small atelectasis or infiltrate in the lower lungs. No focal pulmonary consolidation, pleural effusion, or pneumothorax. No acute osseous process.      Small atelectasis or infiltrate in the lower lungs. Mild airways inflammation/bronchitis. Borderline heart size.  This report was finalized on 5/8/2023 10:17 AM by Dr. Andrea Redd M.D.        Medical Decision Making:  ED Course as of 05/08/23 1347   Mon May 08, 2023   1005 XR Chest 1 View  My independent interpretation of the chest x-ray is prior sternotomy.  No dense consolidation. [TR]   1027 Ordered breathing treatments for the patient.  Patient family at bedside is refusing.  They acknowledge the fact that his O2 sat is 88%, reports he does not need medication, and his oxygen saturation is fine.  Ordering RT to leave the room. [MP]   1031 EKG          EKG time: 949  Rhythm/Rate: Wide-complex irregular rhythm  P waves and IL: Absent  QRS, axis: Left bundle branch block, left axis  ST and T waves: Sgarbossa negative    Independently Interpreted by me  Not significantly changed compared to prior 10/6/2021   [TR]   1111 Procalcitonin(!): 0.47 [MP]   1111 WBC: 9.08 [MP]   1111 Hemoglobin(!): 9.7 [MP]   1111 BUN(!): 40 [MP]   1111 Creatinine(!): 1.63 [MP]   1111 Magnesium(!): 2.4 [MP]   1111 HS Troponin T(!!): 122 [MP]   1156 I had an extensive conversation at the bedside with 2 family members by phone and 2 family members in person.  They report their goal overall is to minimize the pain to  the patient but they are not focused on comfort care only.  They do want treatment.  They do want to keep from out of the hospital as much as possible.  They are managed by the heart failure clinic at [place].  He has an EF of 25% by old records.  The patient's renal function is increased over his baseline, he has a markedly elevated BNP, he has significant elevated high-sensitivity troponin, and elevated procalcitonin which is likely related to his renal insufficiency although he is coughing up thick sputum.  He is 89 years old and has a poor ejection fraction and is having episodes of hypoxia.  I did notify them that he is at high risk of decompensation and has multiple comorbidities stacked against him.  At this moment is not clear as far as the trend of his multiple lab abnormalities and findings today.  They are interested in taking him home with home health.  I told them that I would be agreeable to this if their overall goal is more comfort than anything but my hesitancy if their goal is treatment improvement is that home health is not really equipped to handle this number of issues in a way that is responsive enough to improve him.  I did notify them that I would normally admit this patient without any hesitation but I want to respect their wishes and the overall goals of the patient care. [TR]   1222 I discussed again with the patient and family at the bedside.  I discussed pacifically with Cristhian, the patient's son who is an  and 2 additional family members in the room.  They have discussed with the heart failure clinic at [place] and that clinic is going to see him at 3 PM today.  They intend to take him home today.  They do not want him admitted to the hospital.  They do understand that this could result in his death if he were to be worsening.  They understand that we do not have trends on our current blood work.  He reports her goal is for him to be at home and to be comfortable.  Plan discharge  after the dose of Rocephin so that he can go to the heart failure clinic.  He is still having episodes of hypoxia intermittently down to the upper 80s.  We do not have a trend on his troponin.  They verbalized specifically that that they understand that he would normally be admitted to the hospital however they prefer a different course of action today. [TR]      ED Course User Index  [MP] Debbie Dockery PA-C  [TR] Raymond Singh MD       The patient has abnormal lung findings as well as thick green sputum at the bedside.  He has advanced age of 89 years old.  We will obtain chest x-ray as well as laboratory evaluation and sepsis work-up.  My differential diagnosis includes pneumonia, viral syndrome, hypoxia, PE, COPD, CHF, and others.    Procedures:  Procedures    I interpreted the cardiac monitor rhythm and my independent interpretation is: Atrial fibrillation    PPE: The patient wore a mask and I wore an N95 mask throughout the entire patient encounter.          Diagnosis  Final diagnoses:   Acute dyspnea   Acute on chronic congestive heart failure, unspecified heart failure type   Acute on chronic renal insufficiency   Hypoxia   Elevated troponin   Chronic anemia   Pneumonia of both lower lobes due to infectious organism       Note Disclaimer: At [facility], we believe that sharing information builds trust and better relationships. You are receiving this note because you recently visited [facility]. It is possible you will see health information before a provider has talked with you about it. This kind of information can be easy to misunderstand. To help you fully understand what it means for your health, we urge you to discuss this note with your provider.     Raymond Singh MD  05/08/23 9923

## 2023-05-08 NOTE — DISCHARGE INSTRUCTIONS
Return here mainly for any worsening of the condition including shortness of breath, fever over 100.4, altered mental status or confusion, or passing out.  See your heart failure doctors today at 3 PM as scheduled.  Take antibiotics as prescribed starting tomorrow.  We are always happy to take care of you for any concerns that you have.

## 2023-05-10 LAB
BACTERIA SPEC RESP CULT: NORMAL
GRAM STN SPEC: NORMAL

## 2023-05-13 LAB — BACTERIA SPEC AEROBE CULT: NORMAL

## (undated) DEVICE — PATIENT RETURN ELECTRODE, SINGLE-USE, CONTACT QUALITY MONITORING, ADULT, WITH 9FT CORD, FOR PATIENTS WEIGING OVER 33LBS. (15KG): Brand: MEGADYNE

## (undated) DEVICE — BITEBLOCK OMNI BLOC

## (undated) DEVICE — TUR/ENDOSCOPIC CABLE, 10' (3.05 M): Brand: CONMED

## (undated) DEVICE — LOU CYSTO: Brand: MEDLINE INDUSTRIES, INC.

## (undated) DEVICE — GLV SURG SENSICARE PI MIC PF SZ7 LF STRL

## (undated) DEVICE — CANN O2 ETCO2 FITS ALL CONN CO2 SMPL A/ 7IN DISP LF

## (undated) DEVICE — FRCP BX RADJAW4 NDL 2.8 240CM LG OG BX40

## (undated) DEVICE — TUBING, SUCTION, 1/4" X 10', STRAIGHT: Brand: MEDLINE

## (undated) DEVICE — ADAPT CLN BIOGUARD AIR/H2O DISP

## (undated) DEVICE — TIDISHIELD UROLOGY DRAIN BAGS FROSTY VINYL STERILE FITS SIEMENS UROSKOP ACCESS 20 PER CASE: Brand: TIDISHIELD

## (undated) DEVICE — SYRINGE,TOOMEY,IRRIGATION,70CC,STERILE: Brand: MEDLINE

## (undated) DEVICE — DOVER HYDROGEL COATED LATEX FOLEY CATHETER, 30 ML, 3-WAY 22 FR/CH (7.3 MM): Brand: DOVER

## (undated) DEVICE — BAG,DRAINAGE,4L,A/R TOWER,LL,SLIDE TAP: Brand: MEDLINE

## (undated) DEVICE — KT ORCA ORCAPOD DISP STRL

## (undated) DEVICE — SENSR O2 OXIMAX FNGR A/ 18IN NONSTR

## (undated) DEVICE — LN SMPL CO2 SHTRM SD STREAM W/M LUER